# Patient Record
Sex: MALE | Race: WHITE | HISPANIC OR LATINO | Employment: FULL TIME | URBAN - METROPOLITAN AREA
[De-identification: names, ages, dates, MRNs, and addresses within clinical notes are randomized per-mention and may not be internally consistent; named-entity substitution may affect disease eponyms.]

---

## 2017-07-25 ENCOUNTER — HOSPITAL ENCOUNTER (OUTPATIENT)
Dept: RADIOLOGY | Facility: HOSPITAL | Age: 47
Discharge: HOME | End: 2017-07-25
Attending: INTERNAL MEDICINE
Payer: MEDICAID

## 2017-07-25 ENCOUNTER — OFFICE VISIT (OUTPATIENT)
Dept: INTERNAL MEDICINE | Facility: CLINIC | Age: 47
End: 2017-07-25
Payer: MEDICAID

## 2017-07-25 VITALS
HEART RATE: 96 BPM | DIASTOLIC BLOOD PRESSURE: 84 MMHG | WEIGHT: 215.38 LBS | SYSTOLIC BLOOD PRESSURE: 138 MMHG | TEMPERATURE: 98 F

## 2017-07-25 DIAGNOSIS — Z00.00 ROUTINE GENERAL MEDICAL EXAMINATION AT A HEALTH CARE FACILITY: ICD-10-CM

## 2017-07-25 DIAGNOSIS — K21.9 GASTROESOPHAGEAL REFLUX DISEASE, ESOPHAGITIS PRESENCE NOT SPECIFIED: ICD-10-CM

## 2017-07-25 DIAGNOSIS — R53.1 PARESTHESIAS WITH SUBJECTIVE WEAKNESS: ICD-10-CM

## 2017-07-25 DIAGNOSIS — R20.2 PARESTHESIAS WITH SUBJECTIVE WEAKNESS: ICD-10-CM

## 2017-07-25 DIAGNOSIS — Z00.00 ROUTINE GENERAL MEDICAL EXAMINATION AT A HEALTH CARE FACILITY: Primary | ICD-10-CM

## 2017-07-25 DIAGNOSIS — Z12.11 COLON CANCER SCREENING: ICD-10-CM

## 2017-07-25 DIAGNOSIS — Z12.5 PROSTATE CANCER SCREENING: ICD-10-CM

## 2017-07-25 PROCEDURE — 71020 XR CHEST PA AND LATERAL: CPT | Mod: TC

## 2017-07-25 PROCEDURE — 72050 X-RAY EXAM NECK SPINE 4/5VWS: CPT | Mod: TC

## 2017-07-25 PROCEDURE — 71020 XR CHEST PA AND LATERAL: CPT | Mod: 26,,, | Performed by: RADIOLOGY

## 2017-07-25 PROCEDURE — 72050 X-RAY EXAM NECK SPINE 4/5VWS: CPT | Mod: 26,,, | Performed by: RADIOLOGY

## 2017-07-25 PROCEDURE — 99999 PR PBB SHADOW E&M-NEW PATIENT-LVL III: CPT | Mod: PBBFAC,,, | Performed by: INTERNAL MEDICINE

## 2017-07-25 PROCEDURE — 99386 PREV VISIT NEW AGE 40-64: CPT | Mod: S$PBB,,, | Performed by: INTERNAL MEDICINE

## 2017-07-26 ENCOUNTER — LAB VISIT (OUTPATIENT)
Dept: LAB | Facility: HOSPITAL | Age: 47
End: 2017-07-26
Attending: INTERNAL MEDICINE
Payer: MEDICAID

## 2017-07-26 DIAGNOSIS — Z00.00 ROUTINE GENERAL MEDICAL EXAMINATION AT A HEALTH CARE FACILITY: ICD-10-CM

## 2017-07-26 DIAGNOSIS — Z12.5 PROSTATE CANCER SCREENING: ICD-10-CM

## 2017-07-26 LAB
ALBUMIN SERPL BCP-MCNC: 4.1 G/DL
ALP SERPL-CCNC: 55 U/L
ALT SERPL W/O P-5'-P-CCNC: 21 U/L
ANION GAP SERPL CALC-SCNC: 10 MMOL/L
AST SERPL-CCNC: 15 U/L
BASOPHILS # BLD AUTO: 0.03 K/UL
BASOPHILS NFR BLD: 0.4 %
BILIRUB SERPL-MCNC: 0.4 MG/DL
BUN SERPL-MCNC: 14 MG/DL
CALCIUM SERPL-MCNC: 9.1 MG/DL
CHLORIDE SERPL-SCNC: 103 MMOL/L
CHOLEST/HDLC SERPL: 7.1 {RATIO}
CO2 SERPL-SCNC: 25 MMOL/L
COMPLEXED PSA SERPL-MCNC: 0.57 NG/ML
CREAT SERPL-MCNC: 0.9 MG/DL
DIFFERENTIAL METHOD: NORMAL
EOSINOPHIL # BLD AUTO: 0.4 K/UL
EOSINOPHIL NFR BLD: 5.3 %
ERYTHROCYTE [DISTWIDTH] IN BLOOD BY AUTOMATED COUNT: 13.2 %
EST. GFR  (AFRICAN AMERICAN): >60 ML/MIN/1.73 M^2
EST. GFR  (NON AFRICAN AMERICAN): >60 ML/MIN/1.73 M^2
ESTIMATED AVG GLUCOSE: 120 MG/DL
GLUCOSE SERPL-MCNC: 94 MG/DL
HBA1C MFR BLD HPLC: 5.8 %
HCT VFR BLD AUTO: 44.4 %
HDL/CHOLESTEROL RATIO: 14 %
HDLC SERPL-MCNC: 200 MG/DL
HDLC SERPL-MCNC: 28 MG/DL
HGB BLD-MCNC: 14.9 G/DL
LDLC SERPL CALC-MCNC: 125.8 MG/DL
LYMPHOCYTES # BLD AUTO: 2.3 K/UL
LYMPHOCYTES NFR BLD: 28.1 %
MCH RBC QN AUTO: 28.7 PG
MCHC RBC AUTO-ENTMCNC: 33.6 G/DL
MCV RBC AUTO: 85 FL
MONOCYTES # BLD AUTO: 0.5 K/UL
MONOCYTES NFR BLD: 6.4 %
NEUTROPHILS # BLD AUTO: 4.9 K/UL
NEUTROPHILS NFR BLD: 59.6 %
NONHDLC SERPL-MCNC: 172 MG/DL
PLATELET # BLD AUTO: 182 K/UL
PMV BLD AUTO: 12.2 FL
POTASSIUM SERPL-SCNC: 4.6 MMOL/L
PROT SERPL-MCNC: 7.6 G/DL
RBC # BLD AUTO: 5.2 M/UL
SODIUM SERPL-SCNC: 138 MMOL/L
TRIGL SERPL-MCNC: 231 MG/DL
TSH SERPL DL<=0.005 MIU/L-ACNC: 0.84 UIU/ML
WBC # BLD AUTO: 8.23 K/UL

## 2017-07-26 PROCEDURE — 84443 ASSAY THYROID STIM HORMONE: CPT

## 2017-07-26 PROCEDURE — 85025 COMPLETE CBC W/AUTO DIFF WBC: CPT

## 2017-07-26 PROCEDURE — 36415 COLL VENOUS BLD VENIPUNCTURE: CPT

## 2017-07-26 PROCEDURE — 84153 ASSAY OF PSA TOTAL: CPT

## 2017-07-26 PROCEDURE — 80053 COMPREHEN METABOLIC PANEL: CPT

## 2017-07-26 PROCEDURE — 83036 HEMOGLOBIN GLYCOSYLATED A1C: CPT

## 2017-07-26 PROCEDURE — 80061 LIPID PANEL: CPT

## 2017-07-27 NOTE — PROGRESS NOTES
Subjective:       Patient ID: Zeeshan Patel is a 47 y.o. male.    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 Overall doing well. We discussed his previous medical history that includes hx of gastric ulcer as per EGD done in 2014. He was treated with PPI and sx's resolved He occasionally experiences reflux and was told by his local MD that he might need surgery to address his hiatal hernia. I discussed the issue of HH  Associated with reflux and the need to prevent problems in the future. He denies abdominal pain, dysphagia, weight loss. Will obtain stool for H pylori and occult blood and treat accordingly. He also relates hx of work-related accident in 2012 while working at the [employer]. He was struck by a heavy truck door on his head sustaining a large laceration, and C spine issues due to the spine hyperextension as a result of the blow. Brings previous records including MRI report that reported spinal chord changes, myelomalacia. He reports bilateral arm numbness, R flank numbness. Has some AM stiffness lasting 10-15 minutes and resolves. I will obtain C-spine XR initially and proceed from there. I will request copy of the MRI if available as well. If not, will obtain MRI if indicated.  Review of Systems   All other systems reviewed and are negative.      Objective:      Physical Exam   Constitutional: He is oriented to person, place, and time. He appears well-developed and well-nourished. No distress.   HENT:   Head: Normocephalic and atraumatic.   Right Ear: External ear normal.   Left Ear: External ear normal.   Nose: Nose normal.   Mouth/Throat: Oropharynx is clear and moist. No oropharyngeal exudate.   Eyes: Conjunctivae and EOM are normal. Pupils are equal, round, and reactive to light. Right eye exhibits no discharge. Left eye exhibits no discharge. No scleral icterus.   Neck: Neck supple.  No JVD present. No thyromegaly present.   Cardiovascular: Normal rate, regular rhythm, normal heart sounds and intact distal pulses.    No murmur heard.  Pulmonary/Chest: Effort normal and breath sounds normal. No respiratory distress. He has no wheezes. He exhibits no tenderness.   Abdominal: Soft. Bowel sounds are normal. He exhibits no distension and no mass. There is no tenderness.   Musculoskeletal: Normal range of motion. He exhibits no edema.   Lymphadenopathy:     He has no cervical adenopathy.   Neurological: He is alert and oriented to person, place, and time. No cranial nerve deficit.   Skin: Skin is warm and dry. No rash noted. He is not diaphoretic. No erythema.   Psychiatric: He has a normal mood and affect. His behavior is normal. Judgment and thought content normal.   Nursing note and vitals reviewed.      Assessment:       1. Routine general medical examination at a health care facility    2. Paresthesias with subjective weakness    3. Prostate cancer screening    4. Colon cancer screening    5. Gastroesophageal reflux disease, esophagitis presence not specified        Plan:    1. Obtain labs.         2. Stool for heme and H pylori.         3. C-spine XR with flexion/extension views.         4. CXR.         5. Consider repeat MRI C spine if indicated.         6. RTC for review.

## 2017-08-03 ENCOUNTER — OFFICE VISIT (OUTPATIENT)
Dept: INTERNAL MEDICINE | Facility: CLINIC | Age: 47
End: 2017-08-03
Payer: MEDICAID

## 2017-08-03 DIAGNOSIS — R20.2 PARESTHESIA OF RIGHT ARM: ICD-10-CM

## 2017-08-03 DIAGNOSIS — Z71.89 ENCOUNTER FOR MEDICATION REVIEW AND COUNSELING: Primary | ICD-10-CM

## 2017-08-03 DIAGNOSIS — M50.30 DDD (DEGENERATIVE DISC DISEASE), CERVICAL: ICD-10-CM

## 2017-08-03 PROCEDURE — 99211 OFF/OP EST MAY X REQ PHY/QHP: CPT | Mod: PBBFAC | Performed by: INTERNAL MEDICINE

## 2017-08-03 PROCEDURE — 99213 OFFICE O/P EST LOW 20 MIN: CPT | Mod: S$PBB,,, | Performed by: INTERNAL MEDICINE

## 2017-08-03 PROCEDURE — 99999 PR PBB SHADOW E&M-EST. PATIENT-LVL I: CPT | Mod: PBBFAC,,, | Performed by: INTERNAL MEDICINE

## 2017-08-18 PROBLEM — R20.2 PARESTHESIA OF RIGHT ARM: Status: ACTIVE | Noted: 2017-08-18

## 2017-08-18 NOTE — PROGRESS NOTES
Mr Griffin Patel here today for his scheduled review. I gave him copies of his studies and discussed them in detail including blood work that showed mild increase in cholesterol at 200 with low HDL fraction and increased TG's at 231. A1-C was at the upper limit of normal at 5.8 with normal FBS. I discussed the implications of these findings vis a vis future health issues and the need to address this vis proper nutrition, exercise and weight management. C spine XR showed degenerative changes at various levels. He has hx of C spine trauma as my initial note reflects, and will consider additional imaging studies -ie MRI, if indicated. He will let me know. All other parameters were within normal limits. I will see him back in 6 months or sooner if needed.

## 2018-01-24 ENCOUNTER — OFFICE VISIT (OUTPATIENT)
Dept: INTERNAL MEDICINE | Facility: CLINIC | Age: 48
End: 2018-01-24
Payer: MEDICAID

## 2018-01-24 DIAGNOSIS — M79.644 PAIN IN FINGER OF RIGHT HAND: Primary | ICD-10-CM

## 2018-01-24 PROCEDURE — 99999 PR PBB SHADOW E&M-EST. PATIENT-LVL II: CPT | Mod: PBBFAC,,, | Performed by: INTERNAL MEDICINE

## 2018-01-24 PROCEDURE — 99212 OFFICE O/P EST SF 10 MIN: CPT | Mod: PBBFAC | Performed by: INTERNAL MEDICINE

## 2018-01-24 PROCEDURE — 99213 OFFICE O/P EST LOW 20 MIN: CPT | Mod: S$PBB,,, | Performed by: INTERNAL MEDICINE

## 2018-01-25 ENCOUNTER — HOSPITAL ENCOUNTER (OUTPATIENT)
Dept: RADIOLOGY | Facility: HOSPITAL | Age: 48
Discharge: HOME | End: 2018-01-25
Attending: INTERNAL MEDICINE
Payer: MEDICAID

## 2018-01-25 DIAGNOSIS — M79.644 PAIN IN FINGER OF RIGHT HAND: ICD-10-CM

## 2018-01-25 PROCEDURE — 73140 X-RAY EXAM OF FINGER(S): CPT | Mod: 26,RT,, | Performed by: RADIOLOGY

## 2018-01-25 PROCEDURE — 73140 X-RAY EXAM OF FINGER(S): CPT | Mod: TC,FY,RT

## 2018-02-06 VITALS — DIASTOLIC BLOOD PRESSURE: 84 MMHG | SYSTOLIC BLOOD PRESSURE: 128 MMHG | HEART RATE: 68 BPM

## 2018-02-06 NOTE — PROGRESS NOTES
Subjective:       Patient ID: Zeeshan Patel is a 48 y.o. male.    Chief Complaint: Follow-up    HPIMr Zeeshan here today for evaluation of recent injury to his R 4th finger. He was drying himself at home with a towel after bathing and felt pain in his finger while grabbing the towel and drying his back. He later noted his finger to be flexed being unable to fully extend the distal portion of his finger. I obtained XR that did not show any significant abnormalities. I recommended some ROM exercises that he could do on his own and will monitor. He has no significant pain.  Review of Systems   All other systems reviewed and are negative.      Objective:      Physical Exam   Constitutional: He appears well-developed and well-nourished. No distress.   Cardiovascular: Normal rate, regular rhythm, normal heart sounds and intact distal pulses.    No murmur heard.  Pulmonary/Chest: Effort normal and breath sounds normal. No respiratory distress. He has no wheezes.   Musculoskeletal: He exhibits deformity.   R 4th finger with DIP in a flexed position as described in  HPI. Dislocated tendon?   Skin: He is not diaphoretic.   Nursing note and vitals reviewed.      Assessment:       1. Pain in finger of right hand        Plan:    1. Monitor.         2. Consider formal Ortho referral if needed.         3. RTC Prn.

## 2018-05-23 ENCOUNTER — HOSPITAL ENCOUNTER (EMERGENCY)
Facility: HOSPITAL | Age: 48
Discharge: HOME | End: 2018-05-23
Attending: EMERGENCY MEDICINE
Payer: MEDICAID

## 2018-05-23 VITALS
OXYGEN SATURATION: 97 % | DIASTOLIC BLOOD PRESSURE: 92 MMHG | BODY MASS INDEX: 37.56 KG/M2 | HEART RATE: 100 BPM | SYSTOLIC BLOOD PRESSURE: 123 MMHG | HEIGHT: 64 IN | WEIGHT: 220 LBS | RESPIRATION RATE: 18 BRPM | TEMPERATURE: 99 F

## 2018-05-23 DIAGNOSIS — J01.90 ACUTE NON-RECURRENT SINUSITIS, UNSPECIFIED LOCATION: Primary | ICD-10-CM

## 2018-05-23 PROCEDURE — 99283 EMERGENCY DEPT VISIT LOW MDM: CPT

## 2018-05-23 RX ORDER — FLUTICASONE PROPIONATE 50 MCG
1 SPRAY, SUSPENSION (ML) NASAL 2 TIMES DAILY PRN
Qty: 15 G | Refills: 0 | Status: SHIPPED | OUTPATIENT
Start: 2018-05-23

## 2018-05-23 NOTE — DISCHARGE INSTRUCTIONS
You can try Sudafed over the counter for relief    Please follow up with ENT    If fever, worsening pain, or symptoms that persist > 2 weeks, please see PCP

## 2018-05-23 NOTE — ED TRIAGE NOTES
Pt reports that his left ear is clogged up and his face is numb that started 0200 this morning. Pt reports not being able to hear well. Pt reports he hasn't had any meds. Pt rates pain 6 out 10. Hx of sinus infection. Pt denies any s/s other than h/a at this time. Pt has no slurred speech or facial droop.    Patient identifiers verified and correct   LOC: The patient is awake, alert and aware of environment with an appropriate affect, the patient is oriented x 3 and speaking appropriately.   APPEARANCE: Patient appears comfortable and in no acute distress, patient is clean and well groomed.  SKIN: The skin is warm and dry, color consistent with ethnicity, patient has normal skin turgor and moist mucus membranes, skin intact, no breakdown or bruising noted.   MUSCULOSKELETAL: Patient moving all extremities spontaneously, no swelling noted.  RESPIRATORY: Airway is open and patent, respirations are spontaneous, patient has a normal effort and rate, no accessory muscle use noted

## 2018-05-23 NOTE — ED PROVIDER NOTES
Encounter Date: 5/23/2018    SCRIBE #1 NOTE: I, Dr. Koroma, am scribing for, and in the presence of,  Juan David Webb. I have scribed the following portions of the note -       History     Chief Complaint   Patient presents with    Numbness     reports facial numbness alone. Denies any other numbness/tingling. States began this morning. Reports recent sinus congestion.      Time patient was seen by the provider: 12:29 PM      The patient is a 48 y.o. Male who presents to the ED with a facial numbness that began at 2 am this morning.  Pt states he has been having pain in left ear, associated with decreased hearing and sinus congestion ( thick green discharge when blowing nose).  Pt states numbness is located around the eyes and cheeks, bilaterally.  He also complains of sore throat and pain on swallowing.  Denies fever, headaches, blurred vision, facial droop, slurred speech, dizziness or unilateral weakness.  Pt did not take medication for relief.  No sick contacts.            Review of patient's allergies indicates:  No Known Allergies  Past Medical History:   Diagnosis Date    GERD (gastroesophageal reflux disease)     Ulcer      Past Surgical History:   Procedure Laterality Date    APPENDECTOMY      left shoulder      WRIST ARTHROPLASTY       Family History   Problem Relation Age of Onset    Cancer Mother     Kidney disease Mother     Hypertension Mother     Hypertension Father     Hypertension Maternal Aunt     Diabetes Maternal Grandfather      Social History   Substance Use Topics    Smoking status: Current Every Day Smoker    Smokeless tobacco: Never Used    Alcohol use No     Review of Systems   Constitutional: Negative for fever.   HENT: Positive for ear pain, sinus pain, sinus pressure (L side of face) and trouble swallowing. Negative for ear discharge and sore throat.    Eyes: Negative for visual disturbance.   Respiratory: Negative for cough.    Cardiovascular: Negative for chest pain.    Gastrointestinal: Negative for nausea and vomiting.   Genitourinary: Negative for dysuria.   Musculoskeletal: Negative for back pain.   Skin: Negative for rash.   Neurological: Positive for numbness (face). Negative for speech difficulty.       Physical Exam     Initial Vitals [05/23/18 1206]   BP Pulse Resp Temp SpO2   (!) 146/96 (!) 117 18 98.3 °F (36.8 °C) 96 %      MAP       112.67         Physical Exam    Nursing note and vitals reviewed.  Constitutional: He appears well-developed and well-nourished. He is not diaphoretic. No distress.   HENT:   Head: Normocephalic and atraumatic.   Mouth/Throat: Posterior oropharyngeal erythema present. No oropharyngeal exudate.   Bilateral cerumen impaction. Frontal and maxillary sinus tenderness.    Neck: Normal range of motion. Neck supple. No JVD present.   Cardiovascular: Normal rate, regular rhythm, normal heart sounds and intact distal pulses.   Pulmonary/Chest: Breath sounds normal. No respiratory distress. He has no wheezes. He has no rhonchi. He has no rales.   Abdominal: Soft. He exhibits no distension. There is no tenderness.   Musculoskeletal: Normal range of motion. He exhibits no edema.   Lymphadenopathy:     He has no cervical adenopathy.   Neurological: He is alert and oriented to person, place, and time. He has normal strength. No cranial nerve deficit or sensory deficit.   Normal sensation to sharp and dull stimuli of the face     Skin: Skin is warm and dry.         ED Course   Procedures  Labs Reviewed - No data to display          Medical Decision Making:   History:   Old Medical Records: I decided to obtain old medical records.  Initial Assessment:   Urgent evaluation of 49 yo male presenting w/ ear pain, decreased hearing and sinus congestion.  Differential Diagnosis:   My initial differential diagnoses include but are not limited to: acute sinusitis, eustachian tube dysfunction, cerumen impaction.   ED Management:  Pt is neurologically intact.  Sensation is symmetrical with no deficits. I doubt CVA/TIA. Sx are likely secondary to acute sinusitis. He has no red flags. Therefore, will treat symptomatically with Flonase. Discussed getting sudafed over the counter. I have irrigated bilateral ears with removal of cerumen of L ear and he reports improvement of hearing. Will refer to ENT. Pt prescribed Debrox.               Scribe Attestation:   Scribe #1: I performed the above scribed service and the documentation accurately describes the services I performed. I attest to the accuracy of the note.    Attending Attestation:           Physician Attestation for Scribe:      Comments: I, Dr. Katerin Koroma, personally performed the services described in this documentation. All medical record entries made by the scribe were at my direction and in my presence.  I have reviewed the chart and agree that the record reflects my personal performance and is accurate and complete. Katerin Koroma MD.                 Clinical Impression:   The encounter diagnosis was Acute non-recurrent sinusitis, unspecified location.    Disposition:   Disposition: Discharged  Condition: Stable                        Katerin Koroma MD  05/24/18 1462

## 2021-03-31 ENCOUNTER — IMMUNIZATION (OUTPATIENT)
Dept: PRIMARY CARE CLINIC | Facility: CLINIC | Age: 51
End: 2021-03-31
Payer: MEDICAID

## 2021-03-31 DIAGNOSIS — Z23 NEED FOR VACCINATION: Primary | ICD-10-CM

## 2021-03-31 PROCEDURE — 0001A COVID-19, MRNA, LNP-S, PF, 30 MCG/0.3 ML DOSE VACCINE: ICD-10-PCS | Mod: CV19,S$GLB,, | Performed by: INTERNAL MEDICINE

## 2021-03-31 PROCEDURE — 91300 COVID-19, MRNA, LNP-S, PF, 30 MCG/0.3 ML DOSE VACCINE: CPT | Mod: S$GLB,,, | Performed by: INTERNAL MEDICINE

## 2021-03-31 PROCEDURE — 91300 COVID-19, MRNA, LNP-S, PF, 30 MCG/0.3 ML DOSE VACCINE: ICD-10-PCS | Mod: S$GLB,,, | Performed by: INTERNAL MEDICINE

## 2021-03-31 PROCEDURE — 0001A COVID-19, MRNA, LNP-S, PF, 30 MCG/0.3 ML DOSE VACCINE: CPT | Mod: CV19,S$GLB,, | Performed by: INTERNAL MEDICINE

## 2021-04-21 ENCOUNTER — IMMUNIZATION (OUTPATIENT)
Dept: PRIMARY CARE CLINIC | Facility: CLINIC | Age: 51
End: 2021-04-21

## 2021-04-21 DIAGNOSIS — Z23 NEED FOR VACCINATION: Primary | ICD-10-CM

## 2021-04-21 PROCEDURE — 0002A COVID-19, MRNA, LNP-S, PF, 30 MCG/0.3 ML DOSE VACCINE: CPT | Mod: CV19,S$GLB,, | Performed by: INTERNAL MEDICINE

## 2021-04-21 PROCEDURE — 91300 COVID-19, MRNA, LNP-S, PF, 30 MCG/0.3 ML DOSE VACCINE: CPT | Mod: S$GLB,,, | Performed by: INTERNAL MEDICINE

## 2021-04-21 PROCEDURE — 0002A COVID-19, MRNA, LNP-S, PF, 30 MCG/0.3 ML DOSE VACCINE: ICD-10-PCS | Mod: CV19,S$GLB,, | Performed by: INTERNAL MEDICINE

## 2021-04-21 PROCEDURE — 91300 COVID-19, MRNA, LNP-S, PF, 30 MCG/0.3 ML DOSE VACCINE: ICD-10-PCS | Mod: S$GLB,,, | Performed by: INTERNAL MEDICINE

## 2022-08-24 ENCOUNTER — OCCUPATIONAL HEALTH (OUTPATIENT)
Dept: URGENT CARE | Facility: CLINIC | Age: 52
End: 2022-08-24

## 2022-08-24 DIAGNOSIS — Z02.83 ENCOUNTER FOR DRUG SCREENING: Primary | ICD-10-CM

## 2022-08-24 LAB
CTP QC/QA: YES
POC 5 PANEL DRUG SCREEN: NEGATIVE

## 2022-08-24 PROCEDURE — 80305 DRUG TEST PRSMV DIR OPT OBS: CPT | Mod: S$GLB,,, | Performed by: EMERGENCY MEDICINE

## 2022-08-24 PROCEDURE — 80305 POCT RAPID DRUG SCREEN 5 PANEL: ICD-10-PCS | Mod: S$GLB,,, | Performed by: EMERGENCY MEDICINE

## 2022-10-06 ENCOUNTER — PATIENT MESSAGE (OUTPATIENT)
Dept: INTERNAL MEDICINE | Facility: CLINIC | Age: 52
End: 2022-10-06
Payer: MEDICAID

## 2022-10-24 ENCOUNTER — PATIENT MESSAGE (OUTPATIENT)
Dept: INTERNAL MEDICINE | Facility: CLINIC | Age: 52
End: 2022-10-24
Payer: MEDICAID

## 2024-08-05 ENCOUNTER — OFFICE VISIT (OUTPATIENT)
Dept: URGENT CARE | Facility: CLINIC | Age: 54
End: 2024-08-05
Payer: COMMERCIAL

## 2024-08-05 VITALS
HEIGHT: 64 IN | RESPIRATION RATE: 19 BRPM | BODY MASS INDEX: 40.12 KG/M2 | HEART RATE: 85 BPM | WEIGHT: 235 LBS | TEMPERATURE: 98 F | SYSTOLIC BLOOD PRESSURE: 171 MMHG | OXYGEN SATURATION: 97 % | DIASTOLIC BLOOD PRESSURE: 104 MMHG

## 2024-08-05 DIAGNOSIS — S30.0XXA LUMBAR CONTUSION, INITIAL ENCOUNTER: Primary | ICD-10-CM

## 2024-08-05 DIAGNOSIS — Z02.6 ENCOUNTER RELATED TO WORKER'S COMPENSATION CLAIM: ICD-10-CM

## 2024-08-05 DIAGNOSIS — S39.012A ACUTE MYOFASCIAL STRAIN OF LUMBAR REGION, INITIAL ENCOUNTER: ICD-10-CM

## 2024-08-05 LAB
CTP QC/QA: YES
POC 5 PANEL DRUG SCREEN: NEGATIVE

## 2024-08-05 RX ORDER — LIDOCAINE 50 MG/G
1 PATCH TOPICAL
COMMUNITY
Start: 2024-07-31 | End: 2024-08-07

## 2024-08-05 RX ORDER — IBUPROFEN 800 MG/1
800 TABLET ORAL 3 TIMES DAILY PRN
Qty: 30 TABLET | Refills: 0 | Status: SHIPPED | OUTPATIENT
Start: 2024-08-05 | End: 2024-08-15

## 2024-08-05 RX ORDER — IBUPROFEN 800 MG/1
800 TABLET ORAL
COMMUNITY
Start: 2024-07-31 | End: 2024-08-05 | Stop reason: SDUPTHER

## 2024-08-05 RX ORDER — METHOCARBAMOL 500 MG/1
500 TABLET, FILM COATED ORAL 2 TIMES DAILY PRN
Qty: 14 TABLET | Refills: 0 | Status: SHIPPED | OUTPATIENT
Start: 2024-08-05 | End: 2024-08-12

## 2024-08-05 RX ORDER — METHOCARBAMOL 500 MG/1
1 TABLET, FILM COATED ORAL 2 TIMES DAILY
COMMUNITY
Start: 2024-07-31 | End: 2024-08-05 | Stop reason: SDUPTHER

## 2024-08-12 ENCOUNTER — OFFICE VISIT (OUTPATIENT)
Dept: URGENT CARE | Facility: CLINIC | Age: 54
End: 2024-08-12
Payer: COMMERCIAL

## 2024-08-12 VITALS
BODY MASS INDEX: 40.12 KG/M2 | WEIGHT: 235 LBS | DIASTOLIC BLOOD PRESSURE: 108 MMHG | HEART RATE: 83 BPM | OXYGEN SATURATION: 98 % | HEIGHT: 64 IN | RESPIRATION RATE: 18 BRPM | SYSTOLIC BLOOD PRESSURE: 188 MMHG

## 2024-08-12 DIAGNOSIS — S39.012D ACUTE MYOFASCIAL STRAIN OF LUMBAR REGION, SUBSEQUENT ENCOUNTER: ICD-10-CM

## 2024-08-12 DIAGNOSIS — S30.0XXD LUMBAR CONTUSION, SUBSEQUENT ENCOUNTER: Primary | ICD-10-CM

## 2024-08-12 DIAGNOSIS — Z02.6 ENCOUNTER RELATED TO WORKER'S COMPENSATION CLAIM: ICD-10-CM

## 2024-08-12 PROCEDURE — 99213 OFFICE O/P EST LOW 20 MIN: CPT | Mod: S$GLB,,, | Performed by: PHYSICIAN ASSISTANT

## 2024-08-12 RX ORDER — LOSARTAN POTASSIUM AND HYDROCHLOROTHIAZIDE 25; 100 MG/1; MG/1
TABLET ORAL
COMMUNITY
Start: 2024-03-07

## 2024-08-12 RX ORDER — DICLOFENAC SODIUM 10 MG/G
2 GEL TOPICAL 4 TIMES DAILY PRN
Qty: 200 G | Refills: 0 | Status: SHIPPED | OUTPATIENT
Start: 2024-08-12 | End: 2024-08-26

## 2024-08-12 RX ORDER — CYCLOBENZAPRINE HCL 10 MG
10 TABLET ORAL 3 TIMES DAILY PRN
Qty: 30 TABLET | Refills: 0 | Status: SHIPPED | OUTPATIENT
Start: 2024-08-12 | End: 2024-08-22

## 2024-08-12 RX ORDER — METFORMIN HYDROCHLORIDE 500 MG/1
TABLET, EXTENDED RELEASE ORAL
COMMUNITY

## 2024-08-12 RX ORDER — ASPIRIN 81 MG/1
1 TABLET ORAL DAILY
COMMUNITY

## 2024-08-12 NOTE — PROGRESS NOTES
Subjective:      Patient ID: Zeeshan Patel is a 54 y.o. male.    Chief Complaint: Back Pain    Patient's place of employment - [address] [place] [address]  Patient's job title - Maintenance  Date of injury - 07-31-24  Body part injured including left or right - Back  Injury Mechanism - Fall  What they were doing when they got hurt - climbing  What they did immediately after -   Pain scale right now -     Back Pain      Musculoskeletal:  Positive for back pain.     Objective:     Physical Exam   Assessment:      1. Encounter related to worker's compensation claim      Plan:                 No follow-ups on file.

## 2024-08-12 NOTE — LETTER
[facility] - [facility]  [address] [address]  Phone: [phone]  Fax: [phone]  [facility]: [email or web address]    Pt Name: Zeeshan Patel  Injury Date: 07/31/2024   Employee ID: [id number] Date of  Treatment: 08/12/2024   Company: [employer]      Appointment Time: 08:30 AM Arrived: 8:20 AM    Provider: Rajwinder Higginbotham PA-C Time Out:9:26 AM      Office Treatment:   1. Lumbar contusion, subsequent encounter    2. Acute myofascial strain of lumbar region, subsequent encounter    3. Encounter related to worker's compensation claim      Medications Ordered This Encounter   Medications    cyclobenzaprine (FLEXERIL) 10 MG tablet    diclofenac sodium (VOLTAREN) 1 % Gel        Patient Instructions:  (Please take extra strength tylenol twice daily.  You may take 1/2 of the muscle relaxer up to twice during the day (every 8 hours).  You may take a whole muscle relaxer at night.  Do not drive/operate machinery while taking the muscle relaxer.)      Restrictions: Sit down work only, Sit or stand as needed     Return Appointment: 8/20/2024 at 9:00 AM [facility]

## 2024-08-12 NOTE — PROGRESS NOTES
Subjective:      Patient ID: Zeeshan Patel is a 54 y.o. male.    Chief Complaint: Back Pain    Mr. Griffin Patel presents for follow up of low back injury, DOI 7/31/24.  He works in maintenance at [employer].  He reports his pain is about the same.  He has R sided LBP that sometimes radiates into the right hip area.  He initially had BLE paresthesias, worse on the right, but he reports these are now resolved.  He denies any weakness, saddle anesthesia or B/B dysfunction.  The pain is worse with sit to stand, bending, twisting.  He reports the robaxin does help him sleep, but he wakes up dizzy in the mornings.  He does not feel the ibuprofen is giving him much relief.  His work has not accommodated his restrictions.  His BP is elevated again today and he has a follow up this week to address with primary care.  Review of his chart shows significantly elevated BP several times in the past.  He is asymptomatic.        See MA note below.  Patient's place of employment - [address] [place] [address] [employer]  Patient's job title - Maintenance  Date of Injury - 07-31-24  Body part injured - Back  Current work status per last visit -  Sit down work only, Sit or stand as needed  Improved, same, or worse - Same  Pain Scale right now (1-10) -  8/10    Back Pain  Pertinent negatives include no numbness.     Constitution: Negative.   HENT: Negative.     Neck: neck negative.   Cardiovascular: Negative.    Respiratory: Negative.     Musculoskeletal:  Positive for pain, joint pain, abnormal ROM of joint, back pain, pain with walking, muscle cramps and muscle ache. Negative for trauma and joint swelling.   Skin:  Negative for erythema and bruising.   Neurological:  Negative for numbness and tingling.     Objective:     Physical Exam  Constitutional:       Appearance: Normal appearance.   HENT:      Head: Normocephalic and atraumatic.      Right Ear: External ear normal.      Left Ear: External ear normal.      Nose: Nose normal. No  congestion or rhinorrhea.   Eyes:      Extraocular Movements: Extraocular movements intact.      Conjunctiva/sclera: Conjunctivae normal.   Pulmonary:      Effort: Pulmonary effort is normal. No respiratory distress.   Musculoskeletal:      Cervical back: Normal.      Thoracic back: Normal.      Lumbar back: Spasms and tenderness present. Decreased range of motion. Negative right straight leg raise test and negative left straight leg raise test.        Back:       Comments: No step-offs appreciated.  TTP R lower lumbar spine & paraspinal musculature.  BLE 5/5 HF, KF, KE, DF, PF, EHL.    BLE DTR 2+ & symmetric.  Dec ROM with flexion, extension and side bending bilaterally.   All ROM movements elicit pain.  SLR neg bilaterally.  Difficulty getting on/off exam table.     Skin:     General: Skin is warm.      Findings: No erythema.   Neurological:      General: No focal deficit present.      Mental Status: He is alert and oriented to person, place, and time. Mental status is at baseline.   Psychiatric:         Mood and Affect: Mood normal.         Behavior: Behavior normal.         Thought Content: Thought content normal.         Judgment: Judgment normal.        Assessment:      1. Lumbar contusion, subsequent encounter    2. Acute myofascial strain of lumbar region, subsequent encounter    3. Encounter related to worker's compensation claim      Plan:     D/C ibuprofen & robaxin.  I advised no NSAIDs, as this could be contributing to his elevated BP.  He will take tylenol 2-3 times per day.  Rx for voltaren gel PRN.  He may try 1/2 a flexeril during the day 1-2 times and then a whole flexeril at night.  He may only take the flexeril during the day if he is not working.  I discussed precautions of no driving/operating machinery while taking this medication.  He will follow up in 1 week.  Consider PT if symptoms have not significantly improved.    I note the patient has elevated blood pressures during this encounter.  Patient does not have signs or symptoms suggestive of hypertensive emergency (denies chest pain, shortness breath, vision change, or urinary changes consistent with acute hypertensive kidney disease). Risk of acutely lowering blood pressure exceeds benefit. I have asked the patient follow up with PCP for continued hypertension management.    Medications Ordered This Encounter   Medications    cyclobenzaprine (FLEXERIL) 10 MG tablet     Sig: Take 1 tablet (10 mg total) by mouth 3 (three) times daily as needed for Muscle spasms.     Dispense:  30 tablet     Refill:  0    diclofenac sodium (VOLTAREN) 1 % Gel     Sig: Apply 2 g topically 4 (four) times daily as needed (pain).     Dispense:  200 g     Refill:  0     Patient Instructions:  (Please take extra strength tylenol twice daily.  You may take 1/2 of the muscle relaxer up to twice during the day (every 8 hours).  You may take a whole muscle relaxer at night.  Do not drive/operate machinery while taking the muscle relaxer.)   Restrictions: Sit down work only, Sit or stand as needed  Follow up in about 8 days (around 8/20/2024).

## 2024-08-20 ENCOUNTER — OFFICE VISIT (OUTPATIENT)
Dept: URGENT CARE | Facility: CLINIC | Age: 54
End: 2024-08-20
Payer: COMMERCIAL

## 2024-08-20 VITALS
WEIGHT: 235 LBS | OXYGEN SATURATION: 98 % | HEART RATE: 93 BPM | SYSTOLIC BLOOD PRESSURE: 176 MMHG | DIASTOLIC BLOOD PRESSURE: 105 MMHG | RESPIRATION RATE: 18 BRPM | BODY MASS INDEX: 40.12 KG/M2 | HEIGHT: 64 IN

## 2024-08-20 DIAGNOSIS — M25.551 RIGHT HIP PAIN: ICD-10-CM

## 2024-08-20 DIAGNOSIS — S30.0XXD LUMBAR CONTUSION, SUBSEQUENT ENCOUNTER: Primary | ICD-10-CM

## 2024-08-20 DIAGNOSIS — Z02.6 ENCOUNTER RELATED TO WORKER'S COMPENSATION CLAIM: ICD-10-CM

## 2024-08-20 DIAGNOSIS — S39.012D LUMBAR STRAIN, SUBSEQUENT ENCOUNTER: ICD-10-CM

## 2024-08-20 PROCEDURE — 99214 OFFICE O/P EST MOD 30 MIN: CPT | Mod: S$GLB,,, | Performed by: PHYSICIAN ASSISTANT

## 2024-08-20 RX ORDER — CYCLOBENZAPRINE HCL 10 MG
10 TABLET ORAL 3 TIMES DAILY PRN
Qty: 30 TABLET | Refills: 0 | Status: SHIPPED | OUTPATIENT
Start: 2024-08-20 | End: 2024-08-30

## 2024-08-20 NOTE — PROGRESS NOTES
Subjective:      Patient ID: Zeeshan Patel is a 54 y.o. male.    Chief Complaint: Back Pain    Mr. Griffin Patel presents for follow up of low back injury, DOI 7/31/24.  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  He reports continue right sided LBP.  He denies any radiating leg pain, paresthesias, weakness, saddle anesthesia or B/B dysfunction.  The paresthesias he was experiencing in the legs has resolved.  He also complains that the hip is sore and hurts with sit to stand and walking.  It is also sore to touch.  He has been taking the muscle relaxer twice a day as well as tylenol, which give him relief.  His job is not accommodating light duty.    See MA note below.  Patient's place of employment - [address] [place] [address] [employer]  Patient's job title - Maintenance  Date of Injury - 07-31-24  Body part injured - Back  Current work status per last visit - Sit down work only, Sit or stand as needed  Improved, same, or worse - No Improvement/Same  Pain Scale right now (1-10) -  6/10    Back Pain  Pertinent negatives include no numbness.       Constitution: Negative.   HENT: Negative.     Neck: neck negative.   Cardiovascular: Negative.    Respiratory: Negative.     Musculoskeletal:  Positive for pain, joint pain, abnormal ROM of joint, back pain, pain with walking, muscle cramps and muscle ache. Negative for trauma and joint swelling.   Skin:  Negative for erythema and bruising.   Neurological:  Negative for numbness and tingling.     Objective:     Physical Exam  Constitutional:       Appearance: Normal appearance.   HENT:      Head: Normocephalic and atraumatic.      Right Ear: External ear normal.      Left Ear: External ear normal.      Nose: Nose normal. No congestion or rhinorrhea.   Eyes:      Extraocular Movements: Extraocular movements intact.      Conjunctiva/sclera: Conjunctivae normal.   Pulmonary:      Effort: Pulmonary effort is normal. No respiratory distress.    Musculoskeletal:      Cervical back: Normal.      Thoracic back: Normal.      Lumbar back: Spasms and tenderness present. Decreased range of motion. Negative right straight leg raise test and negative left straight leg raise test.        Back:       Right hip: Tenderness and bony tenderness present. No deformity, lacerations or crepitus. Normal range of motion. Normal strength.        Legs:       Comments: No step-offs appreciated.  TTP R lower lumbar spine & paraspinal musculature.  BLE 5/5 HF, KF, KE, DF, PF, EHL.    BLE DTR 2+ & symmetric.  Dec ROM with flexion, extension and side bending bilaterally.   All ROM movements elicit pain.  SLR neg bilaterally.  Difficulty getting on/off exam table.    R hip with TTP to greater trochanter.  There is hip pain with IR & ER.     Skin:     General: Skin is warm.      Findings: No erythema.   Neurological:      General: No focal deficit present.      Mental Status: He is alert and oriented to person, place, and time. Mental status is at baseline.   Psychiatric:         Mood and Affect: Mood normal.         Behavior: Behavior normal.         Thought Content: Thought content normal.         Judgment: Judgment normal.          EXAMINATION:  XR HIP WITH PELVIS WHEN PERFORMED 2 OR 3 VIEWS RIGHT     CLINICAL HISTORY:  R hip pain s/p fall; Pain in right hip     TECHNIQUE:  AP view of the pelvis and frog leg lateral view of the right hip were performed.     COMPARISON:  None     FINDINGS:  No fracture or dislocation.  No regional soft tissue abnormalities.     Incidentally noted is lower lumbar degenerative change.     Impression:     Unremarkable appearance of the right hip.     Assessment:      1. Lumbar contusion, subsequent encounter    2. Right hip pain    3. Lumbar strain, subsequent encounter    4. Encounter related to worker's compensation claim      Plan:     I personally reviewed R hip XR - negative for fracture.  He continues to have significant back pain & limited  lumbar ROM.  I have placed an order for PT to help improve his pain and ROM.  He will remain on light duty.  Continue flexeril, refill given.  Follow up in 2 weeks to allow for PT scheduling.    I note the patient has elevated blood pressures during this encounter. Patient does not have signs or symptoms suggestive of hypertensive emergency (denies chest pain, shortness breath, vision change, or urinary changes consistent with acute hypertensive kidney disease). Risk of acutely lowering blood pressure exceeds benefit. Mr. Patel did follow up with his PCP last week and his BP meds were adjusted.      Medications Ordered This Encounter   Medications    cyclobenzaprine (FLEXERIL) 10 MG tablet     Sig: Take 1 tablet (10 mg total) by mouth 3 (three) times daily as needed for Muscle spasms.     Dispense:  30 tablet     Refill:  0     Patient Instructions: PT to be scheduled once authorized (Please take extra strength tylenol twice daily.  You may take 1/2 of the muscle relaxer up to twice during the day (every 8 hours).  You may take a whole muscle relaxer at night.  Do not drive/operate machinery while taking the muscle relaxer.)   Restrictions: Sit or stand as needed, Sit down work only  Follow up in about 2 weeks (around 9/3/2024).

## 2024-08-20 NOTE — LETTER
[facility]  [address] [address]  Phone: [phone]  Fax: [phone]  [facility]: [id number]    Pt Name: Zeeshan Patel  Injury Date: 07/31/2024   Employee ID: [id number] Date of Treatment: 08/20/2024   Company: [employer]      Appointment Time: 09:00 AM Arrived: 8:50 AM    Provider: Rajwinder Higginbotham PA-C Time Out:10:23 AM      Office Treatment:   1. Lumbar contusion, subsequent encounter    2. Right hip pain    3. Lumbar strain, subsequent encounter    4. Encounter related to worker's compensation claim      Medications Ordered This Encounter   Medications    cyclobenzaprine (FLEXERIL) 10 MG tablet        Patient Instructions: PT to be scheduled once authorized (Please take extra strength tylenol twice daily.  You may take 1/2 of the muscle relaxer up to twice during the day (every 8 hours).  You may take a whole muscle relaxer at night.  Do not drive/operate machinery while taking the muscle relaxer.)      Restrictions: Sit or stand as needed, Sit down work only     Return Appointment: 9/3/2024 at 8:30 AM [facility]

## 2024-09-03 ENCOUNTER — OFFICE VISIT (OUTPATIENT)
Dept: URGENT CARE | Facility: CLINIC | Age: 54
End: 2024-09-03
Payer: COMMERCIAL

## 2024-09-03 VITALS
HEIGHT: 64 IN | OXYGEN SATURATION: 98 % | DIASTOLIC BLOOD PRESSURE: 97 MMHG | TEMPERATURE: 98 F | BODY MASS INDEX: 40.12 KG/M2 | WEIGHT: 235 LBS | SYSTOLIC BLOOD PRESSURE: 160 MMHG | HEART RATE: 83 BPM | RESPIRATION RATE: 18 BRPM

## 2024-09-03 DIAGNOSIS — S39.012D ACUTE MYOFASCIAL STRAIN OF LUMBAR REGION, SUBSEQUENT ENCOUNTER: ICD-10-CM

## 2024-09-03 DIAGNOSIS — S30.0XXD LUMBAR CONTUSION, SUBSEQUENT ENCOUNTER: Primary | ICD-10-CM

## 2024-09-03 DIAGNOSIS — M25.551 RIGHT HIP PAIN: ICD-10-CM

## 2024-09-03 DIAGNOSIS — Z02.6 ENCOUNTER RELATED TO WORKER'S COMPENSATION CLAIM: ICD-10-CM

## 2024-09-03 PROCEDURE — 99213 OFFICE O/P EST LOW 20 MIN: CPT | Mod: S$GLB,,, | Performed by: PHYSICIAN ASSISTANT

## 2024-09-03 RX ORDER — PRAVASTATIN SODIUM 40 MG/1
40 TABLET ORAL
COMMUNITY
Start: 2024-08-22

## 2024-09-03 RX ORDER — DICLOFENAC SODIUM 10 MG/G
GEL TOPICAL
COMMUNITY

## 2024-09-03 RX ORDER — CYCLOBENZAPRINE HCL 10 MG
TABLET ORAL
COMMUNITY
End: 2024-09-03

## 2024-09-03 RX ORDER — TRIAMCINOLONE ACETONIDE 1 MG/G
CREAM TOPICAL 2 TIMES DAILY
COMMUNITY
Start: 2024-08-22

## 2024-09-03 RX ORDER — METHOCARBAMOL 500 MG/1
TABLET, FILM COATED ORAL
COMMUNITY
End: 2024-09-03

## 2024-09-03 RX ORDER — PANTOPRAZOLE SODIUM 40 MG/1
40 TABLET, DELAYED RELEASE ORAL EVERY MORNING
COMMUNITY
Start: 2024-03-29

## 2024-09-03 RX ORDER — LOSARTAN POTASSIUM AND HYDROCHLOROTHIAZIDE 12.5; 5 MG/1; MG/1
1 TABLET ORAL
COMMUNITY
Start: 2024-08-22 | End: 2024-09-03

## 2024-09-03 RX ORDER — CYCLOBENZAPRINE HCL 10 MG
10 TABLET ORAL 2 TIMES DAILY PRN
Qty: 30 TABLET | Refills: 0 | Status: SHIPPED | OUTPATIENT
Start: 2024-09-03 | End: 2024-09-18

## 2024-09-03 RX ORDER — LOSARTAN POTASSIUM AND HYDROCHLOROTHIAZIDE 12.5; 5 MG/1; MG/1
TABLET ORAL
COMMUNITY
Start: 2024-03-07

## 2024-09-03 RX ORDER — FAMOTIDINE 40 MG/1
40 TABLET, FILM COATED ORAL 2 TIMES DAILY
COMMUNITY
Start: 2024-03-26

## 2024-09-03 NOTE — PROGRESS NOTES
Subjective:      Patient ID: Zeeshan Patel is a 54 y.o. male.    Chief Complaint: Back Pain (low)    Mr. Griffin Patel presents for follow up of low back injury, DOI 7/31/24.  He works in maintenance at [employer].  He reports the pain is better.  He is still having right sided low back pain & some hip pain.  He denies any radiating leg pain, paresthesias, weakness, saddle anesthesia or B/B dysfunction.  PT has still not been approved.  He is taking flexeril twice a day with relief.      See MA note below.  Patient's place of employment - [employer]  Patient's job title - [employer]  Date of Injury - 07/31/2024  Body part injured - low back   Current work status per last visit - No activity  Improved, same, or worse - slightly improved  Pain Scale right now (1-10) -  5/10    Back Pain  Pertinent negatives include no numbness.       Constitution: Negative.   HENT: Negative.     Neck: neck negative.   Cardiovascular: Negative.    Respiratory: Negative.     Musculoskeletal:  Positive for pain, joint pain, abnormal ROM of joint, back pain, pain with walking, muscle cramps and muscle ache. Negative for trauma and joint swelling.   Skin:  Negative for erythema and bruising.   Neurological:  Negative for numbness and tingling.     Objective:     Physical Exam  Constitutional:       Appearance: Normal appearance.   HENT:      Head: Normocephalic and atraumatic.      Right Ear: External ear normal.      Left Ear: External ear normal.      Nose: Nose normal. No congestion or rhinorrhea.   Eyes:      Extraocular Movements: Extraocular movements intact.      Conjunctiva/sclera: Conjunctivae normal.   Pulmonary:      Effort: Pulmonary effort is normal. No respiratory distress.   Musculoskeletal:      Cervical back: Normal.      Thoracic back: Normal.      Lumbar back: Spasms and tenderness present. Decreased range of motion. Negative right straight leg raise test and negative left straight leg raise test.         Back:       Right hip: Tenderness and bony tenderness present. No deformity, lacerations or crepitus. Normal range of motion. Normal strength.      Comments: No step-offs appreciated.  TTP R lower lumbar spine & paraspinal musculature.  BLE 5/5 HF, KF, KE, DF, PF, EHL.    BLE DTR 2+ & symmetric.  Near normal ROM with flexion, side bending.  Still dec with extension.  All ROM movements elicit pain.  SLR neg bilaterally.  Much less difficulty getting on/off exam table today.  R hip with no TTP.       Skin:     General: Skin is warm.      Findings: No erythema.   Neurological:      General: No focal deficit present.      Mental Status: He is alert and oriented to person, place, and time. Mental status is at baseline.   Psychiatric:         Mood and Affect: Mood normal.         Behavior: Behavior normal.         Thought Content: Thought content normal.         Judgment: Judgment normal.        Assessment:      1. Lumbar contusion, subsequent encounter    2. Acute myofascial strain of lumbar region, subsequent encounter    3. Right hip pain    4. Encounter related to worker's compensation claim      Plan:     Mr. Patel is doing much better on exam today & reports pain has improved.  I have reached out to the [facility] regarding PT approval, as it is still pending.  Continue flexeril PRN, refill given.  I have lifted some of his work restrictions.  Follow up in 2 weeks, or sooner, if needed.    Medications Ordered This Encounter   Medications    cyclobenzaprine (FLEXERIL) 10 MG tablet     Sig: Take 1 tablet (10 mg total) by mouth 2 (two) times daily as needed for Muscle spasms (spasm).     Dispense:  30 tablet     Refill:  0     Patient Instructions: Attention not to aggravate affected area, PT to be scheduled once authorized   Restrictions: No lifting/pushing/pulling more than 10 lbs, Avoid climbing/kneeling/squatting, Avoid frequent bending/lifting/twisting  Follow up in about 2 weeks (around 9/17/2024).

## 2024-09-03 NOTE — LETTER
[facility]  [address] [address]  Phone: [phone]  Fax: [phone]  [facility]: [id number]    Pt Name: Zeeshan Patel  Injury Date: 07/31/2024   Employee ID: [id number] Date of Treatment: 09/03/2024   Company: [employer]      Appointment Time: 08:30 AM Arrived: 8:15 AM    Provider: Rajwinder Higginbotham PA-C Time Out:9:21 AM      Office Treatment:   1. Lumbar contusion, subsequent encounter    2. Acute myofascial strain of lumbar region, subsequent encounter    3. Right hip pain    4. Encounter related to worker's compensation claim      Medications Ordered This Encounter   Medications    cyclobenzaprine (FLEXERIL) 10 MG tablet        Patient Instructions: Attention not to aggravate affected area, PT to be scheduled once authorized      Restrictions: No lifting/pushing/pulling more than 10 lbs, Avoid climbing/kneeling/squatting, Avoid frequent bending/lifting/twisting     Return Appointment: 9/17/2024 at 8:30 AM [facility]

## 2024-09-17 ENCOUNTER — OFFICE VISIT (OUTPATIENT)
Dept: URGENT CARE | Facility: CLINIC | Age: 54
End: 2024-09-17
Payer: COMMERCIAL

## 2024-09-17 VITALS
RESPIRATION RATE: 18 BRPM | DIASTOLIC BLOOD PRESSURE: 96 MMHG | HEIGHT: 64 IN | SYSTOLIC BLOOD PRESSURE: 153 MMHG | OXYGEN SATURATION: 98 % | WEIGHT: 235 LBS | TEMPERATURE: 98 F | BODY MASS INDEX: 40.12 KG/M2 | HEART RATE: 62 BPM

## 2024-09-17 DIAGNOSIS — S30.0XXD LUMBAR CONTUSION, SUBSEQUENT ENCOUNTER: ICD-10-CM

## 2024-09-17 DIAGNOSIS — Z02.6 ENCOUNTER RELATED TO WORKER'S COMPENSATION CLAIM: ICD-10-CM

## 2024-09-17 DIAGNOSIS — S39.012D ACUTE MYOFASCIAL STRAIN OF LUMBAR REGION, SUBSEQUENT ENCOUNTER: Primary | ICD-10-CM

## 2024-09-17 PROCEDURE — 99213 OFFICE O/P EST LOW 20 MIN: CPT | Mod: S$GLB,,, | Performed by: PHYSICIAN ASSISTANT

## 2024-09-17 NOTE — LETTER
[facility]  [address] [address]  Phone: [phone]  Fax: [phone]  [facility]: [id number]    Pt Name: Zeeshan Patel  Injury Date: 07/31/2024   Employee ID: [id number] Date of Treatment: 09/17/2024   Company: [employer]      Appointment Time: 08:30 AM Arrived: 8:30 AM    Provider: Rajwinder Higginbotham PA-C Time Out:9:13 AM      Office Treatment:   1. Acute myofascial strain of lumbar region, subsequent encounter    2. Lumbar contusion, subsequent encounter    3. Encounter related to worker's compensation claim          Patient Instructions: Continue Physical Therapy, Daily home exercises/warm soaks      Restrictions: No lifting/pushing/pulling more than 10 lbs, Avoid climbing/kneeling/squatting, Avoid frequent bending/lifting/twisting     Return Appointment: 10/8/2024 at 8:30 AM [facility]

## 2024-09-17 NOTE — PROGRESS NOTES
Subjective:      Patient ID: Zeeshan Patel is a 54 y.o. male.    Chief Complaint: Back Pain    Mr. Griffin Patel presents for follow up of low back injury, DOI 7/31/24.  He works in maintenance at [employer].  He reports his pain is the same.  He has right sided LBP that radiates into the hip intermittently.  He started PT yesterday, but they were unable to proceed with the session due to elevated BP.  He has contacted his PCP to address his BP.  He is still taking flexeril 1-2 times a day.  He denies any radiating leg pain, paresthesias, weakness, saddle anesthesia or B/B dysfunction.  Work is not accommodating light duty.    See MA note below.  Patient's place of employment - [employer]  Patient's job title - [employer]  Date of Injury - 07/31/24  Body part injured - Back   Current work status per last visit - No activity  Improved, same, or worse - Same  Pain Scale right now (1-10) -  7/10    Back Pain  Pertinent negatives include no abdominal pain, chest pain, dysuria, fever, headaches or numbness.       Constitution: Negative. Negative for appetite change, chills, sweating, fatigue and fever.   HENT: Negative.  Negative for ear pain, ear discharge, postnasal drip, sinus pain, sinus pressure and sore throat.    Neck: neck negative. Negative for neck pain and neck stiffness.   Cardiovascular: Negative.  Negative for chest trauma, chest pain, leg swelling, palpitations, sob on exertion and passing out.   Eyes:  Negative for blurred vision.   Respiratory: Negative.  Negative for cough and shortness of breath.    Gastrointestinal:  Negative for abdominal pain, nausea, vomiting and diarrhea.   Genitourinary:  Negative for dysuria, frequency and urgency.   Musculoskeletal:  Positive for pain, joint pain, abnormal ROM of joint, back pain, pain with walking and muscle ache. Negative for trauma, joint swelling and muscle cramps.   Skin:  Negative for rash, erythema and bruising.   Neurological:  Negative  for dizziness, history of vertigo, light-headedness, passing out, facial drooping, speech difficulty, coordination disturbances, loss of balance, headaches, numbness and tingling.   Hematologic/Lymphatic: Negative for easy bruising/bleeding. Does not bruise/bleed easily.   Psychiatric/Behavioral:  Negative for confusion.      Objective:     Physical Exam  Constitutional:       Appearance: Normal appearance.   HENT:      Head: Normocephalic and atraumatic.      Right Ear: External ear normal.      Left Ear: External ear normal.      Nose: Nose normal. No congestion or rhinorrhea.   Eyes:      Extraocular Movements: Extraocular movements intact.      Conjunctiva/sclera: Conjunctivae normal.   Pulmonary:      Effort: Pulmonary effort is normal. No respiratory distress.   Musculoskeletal:      Cervical back: Normal.      Thoracic back: Normal.      Lumbar back: Spasms and tenderness present. Decreased range of motion. Negative right straight leg raise test and negative left straight leg raise test.        Back:       Right hip: Tenderness and bony tenderness present. No deformity, lacerations or crepitus. Normal range of motion. Normal strength.      Comments: No step-offs appreciated.  TTP R lower lumbar spine & paraspinal musculature.  BLE 5/5 HF, KF, KE, DF, PF, EHL.    BLE DTR 2+ & symmetric.  Near normal ROM with flexion, side bending.  Still dec with extension.  All ROM movements elicit pain.  SLR neg bilaterally.       Skin:     General: Skin is warm.      Findings: No erythema.   Neurological:      General: No focal deficit present.      Mental Status: He is alert and oriented to person, place, and time. Mental status is at baseline.   Psychiatric:         Mood and Affect: Mood normal.         Behavior: Behavior normal.         Thought Content: Thought content normal.         Judgment: Judgment normal.        Assessment:      1. Acute myofascial strain of lumbar region, subsequent encounter    2. Lumbar  contusion, subsequent encounter    3. Encounter related to worker's compensation claim      Plan:     Advised tylenol 1-2 times per day in addition to the flexeril PRN.  Continue PT.  Continue daily soaks & stretches at home.  Follow up in 3 weeks to allow time for PT.  Continue work restrictions.       Patient Instructions: Continue Physical Therapy, Daily home exercises/warm soaks   Restrictions: No lifting/pushing/pulling more than 10 lbs, Avoid climbing/kneeling/squatting, Avoid frequent bending/lifting/twisting  Follow up in about 3 weeks (around 10/8/2024).

## 2024-10-08 ENCOUNTER — OFFICE VISIT (OUTPATIENT)
Dept: URGENT CARE | Facility: CLINIC | Age: 54
End: 2024-10-08
Payer: COMMERCIAL

## 2024-10-08 VITALS
SYSTOLIC BLOOD PRESSURE: 156 MMHG | WEIGHT: 235 LBS | DIASTOLIC BLOOD PRESSURE: 96 MMHG | HEIGHT: 64 IN | OXYGEN SATURATION: 100 % | BODY MASS INDEX: 40.12 KG/M2 | RESPIRATION RATE: 19 BRPM | HEART RATE: 69 BPM

## 2024-10-08 DIAGNOSIS — Z02.6 ENCOUNTER RELATED TO WORKER'S COMPENSATION CLAIM: ICD-10-CM

## 2024-10-08 DIAGNOSIS — S39.012D ACUTE MYOFASCIAL STRAIN OF LUMBAR REGION, SUBSEQUENT ENCOUNTER: ICD-10-CM

## 2024-10-08 DIAGNOSIS — S30.0XXD LUMBAR CONTUSION, SUBSEQUENT ENCOUNTER: Primary | ICD-10-CM

## 2024-10-08 PROCEDURE — 99214 OFFICE O/P EST MOD 30 MIN: CPT | Mod: S$GLB,,, | Performed by: SURGERY

## 2024-10-08 NOTE — LETTER
[facility]  [address] [address]  Phone: [phone]  Fax: [phone]  [facility]: [id number]    Pt Name: Zeeshan Patel  Injury Date: 07/31/2024   Employee ID: [id number] Date of Treatment: 10/08/2024   Company: [employer]      Appointment Time: 08:30 AM Arrived: 8:25 AM    Provider: Ann Cabral MD Time Out:9:12 AM      Office Treatment:   1. Lumbar contusion, subsequent encounter    2. Encounter related to worker's compensation claim    3. Acute myofascial strain of lumbar region, subsequent encounter          Patient Instructions: Continue Physical Therapy, Referral to specialist to be scheduled, once authorized (Fax PT notes to [phone]; Get MRI disk from [facility])      Restrictions: No lifting/pushing/pulling more than 10 lbs, Avoid climbing/kneeling/squatting, Avoid frequent bending/lifting/twisting     Return Appointment: 11/6/2024 at 8:30 AM [facility]

## 2024-10-08 NOTE — PROGRESS NOTES
"Subjective:      Patient ID: Zeeshan Patel is a 54 y.o. male.    Chief Complaint: Back Pain    Patient's place of employment - [employer]  Patient's job title - [employer]  Date of Injury - 07/31/24  Body part injured - Back   Current work status per last visit - Not Working   Improved, same, or worse - Same   Pain Scale right now (1-10) -  6/10  SB.    Pt states physical therapy has been helping relief some of the discomfort.         Musculoskeletal:  Positive for abnormal ROM of joint, back pain and muscle ache. Negative for pain with walking ("for long periods") and muscle cramps.   Neurological:  Negative for numbness and tingling.       See MA note above. Begin MD note:    Zeeshan Patel is a 54 y.o. presenting for follow-up of low back injury. He has been feeling a bit better with PT, he is attending it 3x weekly. He feels he can work now but not for long periods. They just recently started to work on lifting with small weights and he notes pain with squatting. He has pain with going form sitting to standing and with extension, he also has pain with laying flat on his back.   He has been out of work as they do not have light duty available.     Objective:     Physical Exam  Vitals and nursing note reviewed.   Constitutional:       General: He is not in acute distress.     Appearance: He is obese. He is not ill-appearing.   HENT:      Head: Normocephalic.   Eyes:      Conjunctiva/sclera: Conjunctivae normal.   Pulmonary:      Effort: Pulmonary effort is normal. No respiratory distress.   Musculoskeletal:      Lumbar back: Tenderness present. Decreased range of motion. Negative right straight leg raise test and negative left straight leg raise test.      Comments: Pain with rising from seated to standing. SLR caused right side lumbar pain bilaterally. Resisted hip abduction and adduction of the right hip is 4/5 with report of increased lumbar pain, left 5/5. He experienced pain to right low " back with attempting to rise for supine. Normal gait.     Skin:     General: Skin is warm.      Coloration: Skin is not pale.   Neurological:      General: No focal deficit present.      Mental Status: He is alert and oriented to person, place, and time.      GCS: GCS eye subscore is 4. GCS verbal subscore is 5. GCS motor subscore is 6.      Motor: Motor function is intact.      Coordination: Coordination is intact.   Psychiatric:         Attention and Perception: Attention normal.         Mood and Affect: Mood normal.         Speech: Speech normal.         Behavior: Behavior normal. Behavior is cooperative.         Thought Content: Thought content normal.        Assessment:      1. Lumbar contusion, subsequent encounter    2. Encounter related to worker's compensation claim    3. Acute myofascial strain of lumbar region, subsequent encounter      Plan:     I reviewed the piror treatment notes related to this injury, MRI was performed in ED on 7/31 ans demonstrates no herniation but some disc bulging, no comment on possible facet arthropathy. Given his persistent pain complaints and weakness I am referring to pain medicine to [facility] for possible injection to support PT progress. He was advised to get MRI disc from hospital system it was originally performed at to help inform treatment plan by pain medicine. Restrictions will continue as previous. Continue PT in the meantime, continuation request sent. Follow-up in 4 weeks.          Diagnoses and plan discussed with the patient, as well as the expected course and duration of symptoms. Risks and benefits of any medication prescribed during this visit was explained, verbal instructions on use given. Clinic/Emergency department return precautions were given, can return to [facility] before scheduled follow-up appointment if notes worsening/aggravation of symptoms. All questions and concerns were addressed prior to discharge. Plan was developed with active input from the  patient and they verbalized understanding of and agreement with the POC.  [facility] was informed of any referrals and relevant orders.  Note was dictated with voice recognition software, please excuse any grammatical errors.    I spent a total of 30 minutes on the day of the visit.  This includes face to face time and non-face to face time preparing to see the patient (e.g. review of medical record), obtaining and/or reviewing separately obtained history, documenting clinical information in the electronic or other health record, independently interpreting results and communicating results to the patient/family/caregiver, or care coordinator.    Patient Instructions: Continue Physical Therapy, Referral to specialist to be scheduled, once authorized (Fax PT notes to [phone]; Get MRI disk from [facility])   Restrictions: No lifting/pushing/pulling more than 10 lbs, Avoid climbing/kneeling/squatting, Avoid frequent bending/lifting/twisting  Follow up in about 4 weeks (around 11/5/2024) for With [facility]ah[facility].

## 2024-10-22 ENCOUNTER — TELEPHONE (OUTPATIENT)
Dept: PAIN MEDICINE | Facility: CLINIC | Age: 54
End: 2024-10-22
Payer: MEDICAID

## 2024-10-22 NOTE — TELEPHONE ENCOUNTER
Staff called to get this patient scheduled for a new patient appointment. Patient stated he did not need another appointment since somebody already got him scheduled for [place].

## 2024-10-31 ENCOUNTER — OFFICE VISIT (OUTPATIENT)
Dept: PAIN MEDICINE | Facility: CLINIC | Age: 54
End: 2024-10-31

## 2024-10-31 VITALS
OXYGEN SATURATION: 98 % | SYSTOLIC BLOOD PRESSURE: 133 MMHG | DIASTOLIC BLOOD PRESSURE: 88 MMHG | TEMPERATURE: 99 F | BODY MASS INDEX: 39.77 KG/M2 | WEIGHT: 231.69 LBS | HEART RATE: 103 BPM | RESPIRATION RATE: 18 BRPM
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
## 2024-10-31 DIAGNOSIS — M48.061 DEGENERATIVE LUMBAR SPINAL STENOSIS: Primary | ICD-10-CM

## 2024-10-31 PROCEDURE — 99214 OFFICE O/P EST MOD 30 MIN: CPT | Mod: PBBFAC | Performed by: ANESTHESIOLOGY

## 2024-10-31 PROCEDURE — 99999 PR PBB SHADOW E&M-EST. PATIENT-LVL IV: CPT | Mod: PBBFAC,,, | Performed by: ANESTHESIOLOGY

## 2024-10-31 RX ORDER — CELECOXIB 100 MG/1
100 CAPSULE ORAL 2 TIMES DAILY
Qty: 60 CAPSULE | Refills: 0 | Status: SHIPPED | OUTPATIENT
Start: 2024-10-31 | End: 2024-10-31

## 2024-10-31 RX ORDER — CELECOXIB 100 MG/1
100 CAPSULE ORAL DAILY PRN
Qty: 30 CAPSULE | Refills: 1 | Status: SHIPPED | OUTPATIENT
Start: 2024-10-31

## 2024-10-31 NOTE — H&P (VIEW-ONLY)
PCP: Andrzej Andujar MD    REFERRING PHYSICIAN: Ann Cabral MD    CHIEF COMPLAINT: lower back pain after work injury    Original HISTORY OF PRESENT ILLNESS: Zeeshan Patel presents to the clinic for the evaluation of the above pain. The pain started July 31st after a fall at work while carrying a washing machine with co-workers. The machine fell backwards onto him.     Original Pain Description:  The pain is located in the lower back and is radiating to the right upper thigh . The pain is described as aching, sharp, shooting, throbbing, and tingling. Exacerbating factors: Sitting, Standing, Bending, Walking, Extension, Flexing, and Lifting. Mitigating factors heat, laying down, massage, medications, physical therapy, and rest. Symptoms interfere with daily activity, sleeping, and work. He states that he can not sleep on his right side due to pain but can on his left. He has been getting nearly 6 hours of sleep a night since the accident and needs to put a pillow in between his legs for comfort. The patient feels like symptoms have been worsening. Patient denies night fever/night sweats, urinary incontinence, bowel incontinence, significant weight loss, significant motor weakness, and loss of sensations.    Original PAIN SCORES:  Best: Pain is 5  Worst: Pain is 9  Current: Pain is 7        10/31/2024    10:00 AM   Last 3 PDI Scores   Pain Disability Index (PDI) 56       INTERVAL HISTORY: (Newest visit at the bottom)   Interval History (Date):       6 weeks of Conservative therapy:  PT: 8/5/24 to current date (three times a week)      Treatments / Medications: (Ice/Heat/NSAIDS/APAP/etc):  Tylenol, Voltaren, flexeril        Interventional Pain Procedures: (Previous injections)  None    Past Medical History:   Diagnosis Date    GERD (gastroesophageal reflux disease)     Ulcer      Past Surgical History:   Procedure Laterality Date    APPENDECTOMY      left shoulder      WRIST ARTHROPLASTY        Social History     Socioeconomic History    Marital status:    Tobacco Use    Smoking status: Every Day    Smokeless tobacco: Never   Substance and Sexual Activity    Alcohol use: No    Drug use: No     Family History   Problem Relation Name Age of Onset    Cancer Mother      Kidney disease Mother      Hypertension Mother      Hypertension Father      Hypertension Maternal Aunt thyroid cancer     Diabetes Maternal Grandfather colon        Review of patient's allergies indicates:  No Known Allergies    Current Outpatient Medications   Medication Sig    aspirin (ECOTRIN) 81 MG EC tablet Take 1 tablet by mouth once daily.    carbamide peroxide (DEBROX) 6.5 % otic solution Place 5 drops into both ears as needed. (Patient not taking: Reported on 8/5/2024)    celecoxib (CELEBREX) 100 MG capsule Take 1 capsule (100 mg total) by mouth 2 (two) times daily.    diclofenac sodium (VOLTAREN) 1 % Gel External for 25 Days    famotidine (PEPCID) 40 MG tablet Take 40 mg by mouth 2 (two) times daily.    fluticasone (FLONASE) 50 mcg/actuation nasal spray 1 spray (50 mcg total) by Each Nare route 2 (two) times daily as needed for Rhinitis. (Patient not taking: Reported on 8/5/2024)    losartan-hydrochlorothiazide 50-12.5 mg (HYZAAR) 50-12.5 mg per tablet 1 tablet Orally Once a day for 90 days    metFORMIN (GLUCOPHAGE-XR) 500 MG ER 24hr tablet TOME MARISA TABLETA TODOS LOS D AS WITH EVENING MEAL FOR 90 DAYS    pantoprazole (PROTONIX) 40 MG tablet Take 40 mg by mouth every morning.    pravastatin (PRAVACHOL) 40 MG tablet Take 40 mg by mouth.    triamcinolone acetonide 0.1% (KENALOG) 0.1 % cream Apply topically 2 (two) times daily.     No current facility-administered medications for this visit.       ROS:  GENERAL: No fever. No chills. No fatigue. Denies weight loss. Denies weight gain.  HEENT: Denies headaches. Denies vision change. Denies eye pain. Denies double vision. Denies ear pain.   CV: Denies chest pain.   PULM: Denies  of shortness of breath.  GI: Denies constipation. No diarrhea. No abdominal pain. Denies nausea. Denies vomiting. No blood in stool.  HEME: Denies bleeding problems.  : Denies urgency. No painful urination. No blood in urine.  MS: Denies joint stiffness. Denies joint swelling. Endorses lower back pain that radiates to the right thigh.  SKIN: Denies rash.   NEURO: Denies seizures. No weakness.  PSYCH:  Denies difficulty sleeping. No anxiety. Denies depression. No suicidal thoughts.       VITALS:   Vitals:    10/31/24 0959   BP: 133/88   Pulse: 103   Resp: 18   Temp: 98.8 °F (37.1 °C)   SpO2: 98%   Weight: 105.1 kg (231 lb 11.3 oz)   PainSc:   8         PHYSICAL EXAM:   GENERAL: Well appearing, in no acute distress, alert and oriented x3.  PSYCH:  Mood and affect appropriate.  SKIN: Skin color, texture, turgor normal, no rashes or lesions.  HEENT:  Normocephalic, atraumatic. Cranial nerves grossly intact.  NECK: No pain to palpation over the cervical paraspinous muscles. No pain to palpation over facets. No pain with neck flexion, extension, or lateral flexion.   PULM: No evidence of respiratory difficulty, symmetric chest rise.  GI:  Non-distended  BACK: limited range of motion due to pain. pain to palpation over the spinous processes of the L 3-5 region. There is pain with facet joint loading BL. There is pain with palpation over the RIGHT sacroiliac joint but not on the LEFT.   EXTREMITIES: No deformities, edema, or skin discoloration.   MUSCULOSKELETAL: Shoulder, hip, and knee provocative maneuvers are negative. No atrophy is noted.  NEURO: Sensation is equal and appropriate bilaterally. Bilateral upper and lower extremity strength is normal and symmetric. Bilateral upper and lower extremity coordination and muscle stretch reflexes are physiologic and symmetric. Plantar response are downgoing. Straight leg raising in the supine position is negative to radicular pain.   GAIT: normal.      LABS:  BUN/Cr:  13/0.9    IMAGIN/31/24 --- MRI LUMBAR SPINE     Clinical data: Back pain.     Procedure:  Sagittal and axial, multi-sequence MR images through the lumbar spine were obtained.     Findings:   The alignment, vertebral body heights and marrow signal intensity are normal.   The conus medullaris terminates at the normal level and is normal in signal intensity.        T12/L1/L2: Minimal central disc bulge with no stenosis.   L2/L3: Small right paracentral disc protrusion with no spinal stenosis or neural foramina stenosis.   L3/L4: Diffuse disc bulge with mild to moderate bilateral neural foramina stenosis.   L4/L5: Diffuse disc bulge with mild to moderate bilateral neural foramina stenosis.   L5/S1: No disc herniation or stenosis.     CT LUMBAR SPINE   Findings:   There is a congenitally narrow canal, on the basis of short pedicles. There are superimposed extradural degenerative changes from L2-3 through L4-5. There is multilevel degenerative disc disease.   The alignment and vertebral body heights are normal.  There is no evidence of fracture or subluxation.  Sagittal coronal reformatted images demonstrate no subluxation, or further findings.     24 --- XR HIP WITH PELVIS WHEN PERFORMED 2 OR 3 VIEWS RIGHT     CLINICAL HISTORY:  R hip pain s/p fall; Pain in right hip     TECHNIQUE:  AP view of the pelvis and frog leg lateral view of the right hip were performed.     COMPARISON:  None     FINDINGS:  No fracture or dislocation.  No regional soft tissue abnormalities.     Incidentally noted is lower lumbar degenerative change.     Impression:     Unremarkable appearance of the right hip.        ASSESSMENT: 54 y.o. year old male with pain, consistent with:    Encounter Diagnoses   Name Primary?    Degenerative lumbar spinal stenosis Yes    Fall (on) (from) other stairs and steps, sequela        DISCUSSION: Zeeshan Patel is a Thai-speaking man who comes to us after a fall. He was carrying a washing  machine up some stairs and fell backwards with the machine landing on top of him. Despite ongoing PT he continues to have back pain radiating to the right leg which is worst with extension and flexion as well as axial loading. Imaging shows L3/4 and 4/L5 bulge with mild to moderate bilateral neural foramina stenosis. Exam shows limited range of motion due to pain and pain to palpation over the spinous processes of the L 3-5 region.     PLAN:  Reviewed prior imaging  Continue PT  Start celebrex 100 mg QD PRN (will use sparingly due to a history of gastric ulcers)  Schedule FABY of the L5/S1 level (To the Right)  RTC after procedure      I would like to thank Ann Cabral MD for the opportunity to assist in the care of this patient. We had a very nice visit and I look forward to continuing their care. Please let me know if I can be of further assistance.     Amanda Lewis  10/31/2024

## 2024-11-06 ENCOUNTER — OFFICE VISIT (OUTPATIENT)
Dept: URGENT CARE | Facility: CLINIC | Age: 54
End: 2024-11-06
Payer: COMMERCIAL

## 2024-11-06 VITALS
SYSTOLIC BLOOD PRESSURE: 145 MMHG | DIASTOLIC BLOOD PRESSURE: 90 MMHG | RESPIRATION RATE: 18 BRPM | WEIGHT: 231 LBS | OXYGEN SATURATION: 97 % | HEIGHT: 64 IN | HEART RATE: 80 BPM | BODY MASS INDEX: 39.44 KG/M2

## 2024-11-06 DIAGNOSIS — S30.0XXD LUMBAR CONTUSION, SUBSEQUENT ENCOUNTER: ICD-10-CM

## 2024-11-06 DIAGNOSIS — Z02.6 ENCOUNTER RELATED TO WORKER'S COMPENSATION CLAIM: ICD-10-CM

## 2024-11-06 DIAGNOSIS — S39.012D ACUTE MYOFASCIAL STRAIN OF LUMBAR REGION, SUBSEQUENT ENCOUNTER: Primary | ICD-10-CM

## 2024-11-06 PROCEDURE — 99213 OFFICE O/P EST LOW 20 MIN: CPT | Mod: S$GLB,,, | Performed by: PHYSICIAN ASSISTANT

## 2024-11-06 RX ORDER — CYCLOBENZAPRINE HCL 10 MG
10 TABLET ORAL 2 TIMES DAILY PRN
Qty: 30 TABLET | Refills: 0 | Status: SHIPPED | OUTPATIENT
Start: 2024-11-06 | End: 2024-11-21

## 2024-11-06 RX ORDER — METFORMIN HYDROCHLORIDE 500 MG/1
TABLET, EXTENDED RELEASE ORAL
COMMUNITY

## 2024-11-06 NOTE — PROGRESS NOTES
Subjective:      Patient ID: Zeeshan Patel is a 54 y.o. male.    Chief Complaint: Back Pain    Mr. Griffin Patel presents for follow up of low back injury, DOI 7/31/24.  He works in maintenance at [employer].  He reports his pain has improved with PT.  He continues to have R sided LBP that radiates into the hip and intermittently down the back of the leg to the knee.  He denies any paresthesias, weakness, saddle anesthesia or B/B dysfunction.  He was seen by pain management and is scheduled for FABY on 11/19/24.  He was prescribed celebrex by pain management, but cannot tolerate it due to GI upset.  He is taking tylenol and using flexeril intermittently, which give him relief.    See MA note below.  Patient's place of employment - [employer]  Patient's job title - Gen Maintenance  Date of Injury - 07-31-24  Body part injured - Back  Current work status per last visit -  No lifting/pushing/pulling more than 10 lbs, Avoid climbing/kneeling/squatting, Avoid frequent bending/lifting/twisting  Improved, same, or worse - Improved  Pain Scale right now (1-10) -  6/10    Back Pain  Pertinent negatives include no fever or numbness.       Constitution: Negative for activity change, appetite change, chills and fever.   HENT:  Negative for ear pain, ear discharge and congestion.    Eyes:  Negative for eye discharge and eye redness.   Respiratory:  Negative for cough.    Gastrointestinal:  Negative for vomiting and diarrhea.   Genitourinary:  Negative for hematuria.   Musculoskeletal:  Positive for pain, abnormal ROM of joint, back pain, pain with walking and muscle ache. Negative for trauma, joint pain, joint swelling and muscle cramps.   Skin:  Negative for rash, erythema, bruising and hives.   Allergic/Immunologic: Negative for hives and sneezing.   Neurological:  Negative for passing out, numbness, tingling and seizures.     Objective:     Physical Exam  Constitutional:       Appearance: Normal  appearance.   HENT:      Head: Normocephalic and atraumatic.      Right Ear: External ear normal.      Left Ear: External ear normal.      Nose: Nose normal. No congestion or rhinorrhea.   Eyes:      Extraocular Movements: Extraocular movements intact.      Conjunctiva/sclera: Conjunctivae normal.   Pulmonary:      Effort: Pulmonary effort is normal. No respiratory distress.   Musculoskeletal:      Cervical back: Normal.      Thoracic back: Normal.      Lumbar back: Spasms and tenderness present. Decreased range of motion. Negative right straight leg raise test and negative left straight leg raise test.        Back:       Right hip: Tenderness and bony tenderness present. No deformity, lacerations or crepitus. Normal range of motion. Normal strength.      Comments: No step-offs appreciated.  TTP R lower lumbar spine & paraspinal musculature.  BLE 5/5 HF, KF, KE, DF, PF, EHL.    BLE DTR 2+ & symmetric.  Near normal ROM with flexion, side bending.  Still dec with extension.  All ROM movements elicit pain.  SLR neg bilaterally.       Skin:     General: Skin is warm.      Findings: No erythema.   Neurological:      General: No focal deficit present.      Mental Status: He is alert and oriented to person, place, and time. Mental status is at baseline.   Psychiatric:         Mood and Affect: Mood normal.         Behavior: Behavior normal.         Thought Content: Thought content normal.         Judgment: Judgment normal.        Assessment:      1. Acute myofascial strain of lumbar region, subsequent encounter    2. Lumbar contusion, subsequent encounter    3. Encounter related to worker's compensation claim      Plan:     Rx for flexeril refilled.  I discussed precautions of no driving while taking this medication & do not take within 8 hours of the work shift, as the medication may cause drowsiness.  He will continue PT.  Continue work restrictions.  Follow up after FABY.    Medications Ordered This Encounter    Medications    cyclobenzaprine (FLEXERIL) 10 MG tablet     Sig: Take 1 tablet (10 mg total) by mouth 2 (two) times daily as needed for Muscle spasms.     Dispense:  30 tablet     Refill:  0     Patient Instructions: Attention not to aggravate affected area, Continue Physical Therapy (Pain management FABY scheduled 11/19)   Restrictions: No lifting/pushing/pulling more than 10 lbs, Avoid climbing/kneeling/squatting, Avoid frequent bending/lifting/twisting  Follow up in about 29 days (around 12/5/2024).

## 2024-11-06 NOTE — LETTER
[facility]  [address] [address]  Phone: [phone]  Fax: [phone]  [facility]: [id number]    Pt Name: Zeeshan Patel  Injury Date: 07/31/2024   Employee ID: [id number] Date of Treatment: 11/06/2024   Company: [employer]      Appointment Time: 08:15 AM Arrived: 8:25 AM   Provider: Rajwinder Higginbotham PA-C Time Out:9:15 AM     Office Treatment:   1. Acute myofascial strain of lumbar region, subsequent encounter    2. Lumbar contusion, subsequent encounter    3. Encounter related to worker's compensation claim      Medications Ordered This Encounter   Medications    cyclobenzaprine (FLEXERIL) 10 MG tablet          Patient Instructions: Attention not to aggravate affected area, Continue Physical Therapy (Pain management FABY scheduled 11/19)          Restrictions: No lifting/pushing/pulling more than 10 lbs, Avoid climbing/kneeling/squatting, Avoid frequent bending/lifting/twisting     Return Appointment: 12/5/2024 at 8:30 AM    KW

## 2024-11-12 ENCOUNTER — PATIENT MESSAGE (OUTPATIENT)
Dept: ADMINISTRATIVE | Facility: OTHER | Age: 54
End: 2024-11-12
Payer: COMMERCIAL

## 2024-11-19 ENCOUNTER — HOSPITAL ENCOUNTER (OUTPATIENT)
Facility: OTHER | Age: 54
Discharge: HOME | End: 2024-11-19
Attending: ANESTHESIOLOGY | Admitting: ANESTHESIOLOGY
Payer: COMMERCIAL

## 2024-11-19 ENCOUNTER — PATIENT MESSAGE (OUTPATIENT)
Dept: ADMINISTRATIVE | Facility: OTHER | Age: 54
End: 2024-11-19
Payer: COMMERCIAL

## 2024-11-19 VITALS
TEMPERATURE: 98 F | WEIGHT: 231 LBS | DIASTOLIC BLOOD PRESSURE: 78 MMHG | SYSTOLIC BLOOD PRESSURE: 120 MMHG | HEART RATE: 93 BPM | BODY MASS INDEX: 39.44 KG/M2 | RESPIRATION RATE: 18 BRPM | HEIGHT: 64 IN | OXYGEN SATURATION: 93 %

## 2024-11-19 DIAGNOSIS — G89.29 CHRONIC PAIN: ICD-10-CM

## 2024-11-19 DIAGNOSIS — M54.16 LUMBAR RADICULOPATHY: Primary | ICD-10-CM

## 2024-11-19 PROCEDURE — 62323 NJX INTERLAMINAR LMBR/SAC: CPT | Performed by: ANESTHESIOLOGY

## 2024-11-19 PROCEDURE — 63600175 PHARM REV CODE 636 W HCPCS: Performed by: ANESTHESIOLOGY

## 2024-11-19 PROCEDURE — 25500020 PHARM REV CODE 255: Performed by: ANESTHESIOLOGY

## 2024-11-19 PROCEDURE — 62323 NJX INTERLAMINAR LMBR/SAC: CPT | Mod: ,,, | Performed by: ANESTHESIOLOGY

## 2024-11-19 RX ORDER — SODIUM CHLORIDE 9 MG/ML
INJECTION, SOLUTION INTRAVENOUS CONTINUOUS
Status: DISCONTINUED | OUTPATIENT
Start: 2024-11-19 | End: 2024-11-19 | Stop reason: HOSPADM

## 2024-11-19 RX ORDER — LIDOCAINE HYDROCHLORIDE 20 MG/ML
INJECTION, SOLUTION INFILTRATION; PERINEURAL
Status: DISCONTINUED | OUTPATIENT
Start: 2024-11-19 | End: 2024-11-19 | Stop reason: HOSPADM

## 2024-11-19 RX ORDER — MIDAZOLAM HYDROCHLORIDE 1 MG/ML
INJECTION INTRAMUSCULAR; INTRAVENOUS
Status: DISCONTINUED | OUTPATIENT
Start: 2024-11-19 | End: 2024-11-19 | Stop reason: HOSPADM

## 2024-11-19 RX ORDER — DEXAMETHASONE SODIUM PHOSPHATE 10 MG/ML
INJECTION INTRAMUSCULAR; INTRAVENOUS
Status: DISCONTINUED | OUTPATIENT
Start: 2024-11-19 | End: 2024-11-19 | Stop reason: HOSPADM

## 2024-11-19 RX ORDER — LIDOCAINE HYDROCHLORIDE 10 MG/ML
INJECTION, SOLUTION EPIDURAL; INFILTRATION; INTRACAUDAL; PERINEURAL
Status: DISCONTINUED | OUTPATIENT
Start: 2024-11-19 | End: 2024-11-19 | Stop reason: HOSPADM

## 2024-11-19 RX ORDER — FENTANYL CITRATE 50 UG/ML
INJECTION, SOLUTION INTRAMUSCULAR; INTRAVENOUS
Status: DISCONTINUED | OUTPATIENT
Start: 2024-11-19 | End: 2024-11-19 | Stop reason: HOSPADM

## 2024-11-19 NOTE — OP NOTE
Lumbar Interlaminar Epidural Steroid Injection under Fluoroscopic Guidance    The procedure, risks, benefits, and options were discussed with the patient. There are no contraindications to the procedure. The patent expressed understanding and agreed to the procedure. Informed written consent was obtained prior to the start of the procedure and can be found in the patient's chart.    PATIENT NAME: Zeeshan Patel   MRN: [id number]     DATE OF PROCEDURE: 11/19/2024    PROCEDURE: Lumbar Interlaminar Epidural Steroid Injection L5/S1 under Fluoroscopic Guidance    PRE-OP DIAGNOSIS: Degenerative lumbar spinal stenosis [M48.061] Lumbar radiculopathy [M54.16]    POST-OP DIAGNOSIS: Same    PHYSICIAN: Amanda Lewis MD    ASSISTANTS: Patel Palacios MD     MEDICATIONS INJECTED: Preservative-free Decadron 10mg with 4cc of Lidocaine 1% MPF and preservative free normal saline    LOCAL ANESTHETIC INJECTED: Xylocaine 2%     SEDATION: Versed 2mg and Fentanyl 50mcg                                                                                                                                                                                     Conscious sedation ordered by M.D. Patient re-evaluation prior to administration of conscious sedation. No changes noted in patient's status from initial evaluation. The patient's vital signs were monitored by RN and patient remained hemodynamically stable throughout the procedure.    Event Time In   Sedation Start 0836   Sedation End 0846       ESTIMATED BLOOD LOSS: None    COMPLICATIONS: None    TECHNIQUE: Time-out was performed to identify the patient and procedure to be performed. With the patient laying in a prone position, the surgical area was prepped and draped in the usual sterile fashion using ChloraPrep and a fenestrated drape. The level was determined under fluoroscopy guidance. Skin anesthesia was achieved by injecting Lidocaine 2% over the injection site. The interlaminar space  was then approached with a 18 gauge,  3.5 inch Tuohy needle that was introduced under fluoroscopic guidance in the AP, lateral and/or contralateral oblique imaging. Once the Ligamentum flavum was encountered loss of resistance to air was used to enter the epidural space. With positive loss of resistance and negative aspiration for CSF or Blood, contrast dye Omnipaque (300mg/mL) was injected to confirm placement and there was no vascular runoff. 5 mL of the medication mixture listed above was injected slowly. Displacement of the radio opaque contrast after injection of the medication confirmed that the medication went into the area of the epidural space. The needles were removed and bleeding was nil. A sterile dressing was applied. No specimens collected. The patient tolerated the procedure well.     The patient was monitored after the procedure in the recovery area. They were given post-procedure and discharge instructions to follow at home. The patient was discharged in a stable condition.    I reviewed and edited the fellow's note. I conducted my own interview and physical examination. I agree with the findings. I was present and supervising all critical portions of the procedure.    Patel Palacios MD

## 2024-11-19 NOTE — DISCHARGE SUMMARY
Discharge Note  Short Stay      SUMMARY     Admit Date: 11/19/2024    Attending Physician: Patel Palacios      Discharge Physician: Patel Palacios      Discharge Date: 11/19/2024 8:48 AM    Procedure(s) (LRB):  LUMBAR L5/S1 FABY (TO THE RIGHT) (N/A)    Final Diagnosis: Degenerative lumbar spinal stenosis [M48.061]    Disposition: Home or self care    Patient Instructions:   Current Discharge Medication List        CONTINUE these medications which have NOT CHANGED    Details   celecoxib (CELEBREX) 100 MG capsule Take 1 capsule (100 mg total) by mouth daily as needed for Pain.  Qty: 30 capsule, Refills: 1    Associated Diagnoses: Degenerative lumbar spinal stenosis      cyclobenzaprine (FLEXERIL) 10 MG tablet Take 1 tablet (10 mg total) by mouth 2 (two) times daily as needed for Muscle spasms.  Qty: 30 tablet, Refills: 0      famotidine (PEPCID) 40 MG tablet Take 40 mg by mouth 2 (two) times daily.      losartan-hydrochlorothiazide 50-12.5 mg (HYZAAR) 50-12.5 mg per tablet 1 tablet Orally Once a day for 90 days      !! metFORMIN (GLUCOPHAGE-XR) 500 MG ER 24hr tablet TOME MARISA TABLETA TODOS LOS D AS WITH EVENING MEAL FOR 90 DAYS      !! metFORMIN (GLUCOPHAGE-XR) 500 MG ER 24hr tablet 1 tablet with evening meal Orally Once a day for 90 days      pantoprazole (PROTONIX) 40 MG tablet Take 40 mg by mouth every morning.      pravastatin (PRAVACHOL) 40 MG tablet Take 40 mg by mouth.      triamcinolone acetonide 0.1% (KENALOG) 0.1 % cream Apply topically 2 (two) times daily.       !! - Potential duplicate medications found. Please discuss with provider.              Discharge Diagnosis: Degenerative lumbar spinal stenosis [M48.061]  Condition on Discharge: Stable with no complications to procedure   Diet on Discharge: Same as before.  Activity: as per instruction sheet.  Discharge to: Home with a responsible adult.  Follow up: 2-4 weeks       Please call my office or pager at [phone] if experienced any weakness or loss of  sensation, fever > 101.5, pain uncontrolled with oral medications, persistent nausea/vomiting/or diarrhea, redness or drainage from the incisions, or any other worrisome concerns. If physician on call was not reached or could not communicate with our office for any reason please go to the nearest emergency department

## 2024-11-19 NOTE — DISCHARGE INSTRUCTIONS

## 2024-12-05 ENCOUNTER — OFFICE VISIT (OUTPATIENT)
Dept: URGENT CARE | Facility: CLINIC | Age: 54
End: 2024-12-05
Payer: COMMERCIAL

## 2024-12-05 VITALS
SYSTOLIC BLOOD PRESSURE: 144 MMHG | HEART RATE: 87 BPM | OXYGEN SATURATION: 98 % | HEIGHT: 64 IN | WEIGHT: 231 LBS | TEMPERATURE: 98 F | RESPIRATION RATE: 18 BRPM | BODY MASS INDEX: 39.44 KG/M2 | DIASTOLIC BLOOD PRESSURE: 97 MMHG

## 2024-12-05 DIAGNOSIS — S30.0XXD LUMBAR CONTUSION, SUBSEQUENT ENCOUNTER: ICD-10-CM

## 2024-12-05 DIAGNOSIS — S39.012D ACUTE MYOFASCIAL STRAIN OF LUMBAR REGION, SUBSEQUENT ENCOUNTER: Primary | ICD-10-CM

## 2024-12-05 DIAGNOSIS — Z02.6 ENCOUNTER RELATED TO WORKER'S COMPENSATION CLAIM: ICD-10-CM

## 2024-12-05 NOTE — LETTER
[facility]  [address] [address]  Phone: [phone]  Fax: [phone]  [facility]: [id number]    Pt Name: Zeeshan Patel  Injury Date: 07/31/2024   Employee ID: [id number] Date of Treatment: 12/05/2024   Company: [employer]      Appointment Time: 08:30 AM Arrived: 8:22 AM    Provider: Rajwinder Higginbotham PA-C Time Out:9:18 AM      Office Treatment:   1. Acute myofascial strain of lumbar region, subsequent encounter    2. Lumbar contusion, subsequent encounter    3. Encounter related to worker's compensation claim          Patient Instructions: Attention not to aggravate affected area, Begin Physical Therapy, PT to be scheduled once authorized, Daily home exercises/warm soaks      Restrictions: No lifting/pushing/pulling more than 10 lbs, Avoid frequent bending/lifting/twisting, Avoid climbing/kneeling/squatting     Return Appointment: 1/6/2025 at 8:30 AM BASSAM

## 2024-12-05 NOTE — PROGRESS NOTES
Subjective:      Patient ID: Zeeshan Patel is a 54 y.o. male.    Chief Complaint: Back Injury    Mr. Griffin Patel presents for follow up of low back injury, DOI 7/31/24.  He works in maintenance at [employer].  He continues to have LBP that is worse on the right and radiates into the right lateral leg and hamstring area.  He also complains of right hip pain.  He denies any paresthesias, weakness, saddle anesthesia or B/B dysfunction.  He had L5-S1 FABY 11/19/24 with pain management and reports it did give him improvement in his back pain.  He is scheduled to follow up with PM next week.  Since his last visit, he has finished his course of PT, but would like to continue PT, as it was really helping with his ROM.  He is taking flexeril, but only at night.  He stopped taking NSAIDs due to reflux.  He did F/U with his PCP for HTN and his medication was increased.  His BP has improved since the medication change.  His job continues to not accommodate light duty restrictions.    Patient's place of employment - [employer]  Patient's job title - [employer] Maintenance  Date of Injury - 07-31-24  Body part injured - Back  Current work status per last visit -  No lifting/pushing/pulling more than 10 lbs, Avoid climbing/kneeling/squatting, Avoid frequent bending/lifting/twisting  Improved, same, or worse - Improved  Pain Scale right now (1-10) -  3/10    Patient states felling pressure in lower back with standing and sitting at work for a prolonged time.    Back Pain  This is a new problem. The current episode started 1 to 4 weeks ago. The problem occurs intermittently. The problem has been gradually improving since onset. The pain is present in the lumbar spine. The quality of the pain is described as stabbing. The pain does not radiate. The pain is at a severity of 3/10. The pain is mild. The pain is The same all the time. The symptoms are aggravated by sitting, standing, bending and twisting. Stiffness  is present All day. Pertinent negatives include no fever or numbness.       Constitution: Negative for activity change, appetite change, chills and fever.   HENT:  Negative for ear pain, ear discharge and congestion.    Eyes:  Negative for eye discharge and eye redness.   Respiratory:  Negative for cough.    Gastrointestinal:  Negative for vomiting and diarrhea.   Genitourinary:  Negative for hematuria.   Musculoskeletal:  Positive for pain, abnormal ROM of joint, back pain, pain with walking and muscle ache. Negative for trauma, joint pain, joint swelling and muscle cramps.   Skin:  Negative for rash, erythema, bruising and hives.   Allergic/Immunologic: Negative for hives and sneezing.   Neurological:  Negative for passing out, numbness, tingling and seizures.     Objective:     Physical Exam  Constitutional:       Appearance: Normal appearance.   HENT:      Head: Normocephalic and atraumatic.      Right Ear: External ear normal.      Left Ear: External ear normal.      Nose: Nose normal. No congestion or rhinorrhea.   Eyes:      Extraocular Movements: Extraocular movements intact.      Conjunctiva/sclera: Conjunctivae normal.   Pulmonary:      Effort: Pulmonary effort is normal. No respiratory distress.   Musculoskeletal:      Cervical back: Normal.      Thoracic back: Normal.      Lumbar back: Spasms and tenderness present. Decreased range of motion. Negative right straight leg raise test and negative left straight leg raise test.        Back:       Right hip: Tenderness and bony tenderness present. No deformity, lacerations or crepitus. Normal range of motion. Normal strength.      Comments: No step-offs appreciated.  TTP R lower lumbar spine & paraspinal musculature.  BLE 5/5 HF, KF, KE, DF, PF, EHL.    BLE DTR 2+ & symmetric.  Near normal ROM with flexion, side bending.  Still dec with extension.  R side bending & extension are most painful.  SLR neg bilaterally.       Skin:     General: Skin is warm.       Findings: No erythema.   Neurological:      General: No focal deficit present.      Mental Status: He is alert and oriented to person, place, and time. Mental status is at baseline.   Psychiatric:         Mood and Affect: Mood normal.         Behavior: Behavior normal.         Thought Content: Thought content normal.         Judgment: Judgment normal.      Assessment:      1. Acute myofascial strain of lumbar region, subsequent encounter    2. Lumbar contusion, subsequent encounter    3. Encounter related to worker's compensation claim      Plan:     I have placed an order for another course of PT, as he demonstrated much better ROM after he started attending PT.  F/U with PM as scheduled next week.  Continue flexeril PRN, pt does not need refills at this time.  I discussed precautions of no driving while taking this medication & do not take within 8 hours of the work shift, as the medication may cause drowsiness.  Follow up in 1 month to allow time for PT approval and scheduling, PM F/U.     Patient Instructions: Attention not to aggravate affected area, Begin Physical Therapy, PT to be scheduled once authorized, Daily home exercises/warm soaks   Restrictions: No lifting/pushing/pulling more than 10 lbs, Avoid frequent bending/lifting/twisting, Avoid climbing/kneeling/squatting  Follow up in about 1 month (around 1/6/2025).

## 2024-12-09 ENCOUNTER — PATIENT MESSAGE (OUTPATIENT)
Dept: PAIN MEDICINE | Facility: OTHER | Age: 54
End: 2024-12-09
Payer: COMMERCIAL

## 2024-12-09 ENCOUNTER — OFFICE VISIT (OUTPATIENT)
Dept: PAIN MEDICINE | Facility: CLINIC | Age: 54
End: 2024-12-09
Payer: COMMERCIAL

## 2024-12-09 VITALS
DIASTOLIC BLOOD PRESSURE: 99 MMHG | WEIGHT: 238.13 LBS | HEART RATE: 83 BPM | BODY MASS INDEX: 40.65 KG/M2 | OXYGEN SATURATION: 100 % | RESPIRATION RATE: 12 BRPM | SYSTOLIC BLOOD PRESSURE: 148 MMHG | HEIGHT: 64 IN

## 2024-12-09 DIAGNOSIS — M79.18 MYOFASCIAL PAIN: Primary | ICD-10-CM

## 2024-12-09 DIAGNOSIS — M46.1 SACROILIITIS: ICD-10-CM

## 2024-12-09 DIAGNOSIS — M62.838 MUSCLE SPASM: Primary | ICD-10-CM

## 2024-12-09 DIAGNOSIS — M54.16 LUMBAR RADICULOPATHY: ICD-10-CM

## 2024-12-09 DIAGNOSIS — M79.18 MYOFASCIAL PAIN: ICD-10-CM

## 2024-12-09 DIAGNOSIS — G57.01 PIRIFORMIS SYNDROME OF RIGHT SIDE: ICD-10-CM

## 2024-12-09 DIAGNOSIS — G89.4 CHRONIC PAIN SYNDROME: ICD-10-CM

## 2024-12-09 DIAGNOSIS — M70.61 GREATER TROCHANTERIC BURSITIS OF RIGHT HIP: ICD-10-CM

## 2024-12-09 RX ORDER — METHOCARBAMOL 500 MG/1
500 TABLET, FILM COATED ORAL 3 TIMES DAILY PRN
Qty: 90 TABLET | Refills: 0 | Status: SHIPPED | OUTPATIENT
Start: 2024-12-09

## 2024-12-09 NOTE — PROGRESS NOTES
PCP: Andrzej Andujar MD    REFERRING PHYSICIAN: No ref. provider found    CHIEF COMPLAINT: lower back pain after work injury    Original HISTORY OF PRESENT ILLNESS: Zeeshan Patel presents to the clinic for the evaluation of the above pain. The pain started July 31st after a fall at work while carrying a washing machine with co-workers. The machine fell backwards onto him.     Original Pain Description:  The pain is located in the lower back and is radiating to the right upper thigh . The pain is described as aching, sharp, shooting, throbbing, and tingling. Exacerbating factors: Sitting, Standing, Bending, Walking, Extension, Flexing, and Lifting. Mitigating factors heat, laying down, massage, medications, physical therapy, and rest. Symptoms interfere with daily activity, sleeping, and work. He states that he can not sleep on his right side due to pain but can on his left. He has been getting nearly 6 hours of sleep a night since the accident and needs to put a pillow in between his legs for comfort. The patient feels like symptoms have been worsening. Patient denies night fever/night sweats, urinary incontinence, bowel incontinence, significant weight loss, significant motor weakness, and loss of sensations.    Original PAIN SCORES:  Best: Pain is 5  Worst: Pain is 9  Current: Pain is 7        12/9/2024     8:21 AM   Last 3 PDI Scores   Pain Disability Index (PDI) 49       INTERVAL HISTORY: (Newest visit at the bottom)   Interval History (12/9/2024):    [id number] utilized during today's visit    Zeeshan Patel returns to clinic for follow-up after L5/S1 ILESI to the right on 11/19/2024. He reports 50% relief of back pain. He continues with right lower back pain and right lateral hip pain. The pain in the lower back is worse with walking, sitting, standing. He has taken Celebrex and Flexeril, however, these caused stomach upset. He has a history of gastric ulcer. He discontinued  these. He is still seeing  and will be starting PT again as this provided good relief. He denies recent health changes. He denies recent falls or trauma. He denies new onset fever/night sweats, urinary incontinence, bowel incontinence, significant weight changes, significant motor weakness or changes, or loss of sensations. His pain today is 4/10.      6 weeks of Conservative therapy:  PT: 8/5/24 to current date (three times a week)      Treatments / Medications: (Ice/Heat/NSAIDS/APAP/etc):  Tylenol, Voltaren, flexeril      Interventional Pain Procedures: (Previous injections)  11/19/2024 - L5/S1 ILESI (to the right) - 50% relief of back pain    Past Medical History:   Diagnosis Date    GERD (gastroesophageal reflux disease)     Ulcer      Past Surgical History:   Procedure Laterality Date    APPENDECTOMY      EPIDURAL STEROID INJECTION N/A 11/19/2024    Procedure: LUMBAR L5/S1 FABY (TO THE RIGHT);  Surgeon: Amanda Lewis MD;  Location: [facility];  Service: Pain Management;  Laterality: N/A;  [phone]  2 WK F/U KELSI    left shoulder      WRIST ARTHROPLASTY       Social History     Socioeconomic History    Marital status:    Tobacco Use    Smoking status: Every Day    Smokeless tobacco: Never   Substance and Sexual Activity    Alcohol use: No    Drug use: No     Family History   Problem Relation Name Age of Onset    Cancer Mother      Kidney disease Mother      Hypertension Mother      Hypertension Father      Hypertension Maternal Aunt thyroid cancer     Diabetes Maternal Grandfather colon        Review of patient's allergies indicates:  No Known Allergies    Current Outpatient Medications   Medication Sig    celecoxib (CELEBREX) 100 MG capsule Take 1 capsule (100 mg total) by mouth daily as needed for Pain.    famotidine (PEPCID) 40 MG tablet Take 40 mg by mouth 2 (two) times daily.    losartan-hydrochlorothiazide 50-12.5 mg (HYZAAR) 50-12.5 mg per tablet 1 tablet Orally Once a day for 90 days     "metFORMIN (GLUCOPHAGE-XR) 500 MG ER 24hr tablet KARIE WELLS LOS D AS WITH EVENING MEAL FOR 90 DAYS    metFORMIN (GLUCOPHAGE-XR) 500 MG ER 24hr tablet 1 tablet with evening meal Orally Once a day for 90 days    pantoprazole (PROTONIX) 40 MG tablet Take 40 mg by mouth every morning.    pravastatin (PRAVACHOL) 40 MG tablet Take 40 mg by mouth.    triamcinolone acetonide 0.1% (KENALOG) 0.1 % cream Apply topically 2 (two) times daily.     No current facility-administered medications for this visit.       ROS:  GENERAL: No fever. No chills. No fatigue. Denies weight loss. Denies weight gain.  HEENT: Denies headaches. Denies vision change. Denies eye pain. Denies double vision. Denies ear pain.   CV: Denies chest pain.   PULM: Denies of shortness of breath.  GI: Denies constipation. No diarrhea. No abdominal pain. Denies nausea. Denies vomiting. No blood in stool.  HEME: Denies bleeding problems.  : Denies urgency. No painful urination. No blood in urine.  MS: Denies joint stiffness. Denies joint swelling. Endorses lower back pain that radiates to the right thigh.  SKIN: Denies rash.   NEURO: Denies seizures. No weakness.  PSYCH:  Denies difficulty sleeping. No anxiety. Denies depression. No suicidal thoughts.       VITALS:   Vitals:    12/09/24 0820   Resp: 12   SpO2: 100%   Weight: 108 kg (238 lb 1.6 oz)   Height: 5' 4" (1.626 m)   PainSc:   4   PainLoc: Back         PHYSICAL EXAM:   GENERAL: Well appearing, in no acute distress, alert and oriented x3.  PSYCH:  Mood and affect appropriate.  SKIN: Skin color, texture, turgor normal, no rashes or lesions.  HEENT:  Normocephalic, atraumatic. Cranial nerves grossly intact.  NECK: No pain to palpation over the cervical paraspinous muscles. No pain to palpation over facets. No pain with neck flexion, extension, or lateral flexion.   PULM: No evidence of respiratory difficulty, symmetric chest rise.  GI:  Non-distended  BACK: Full ROM with pain on extension and " axial loading to the right > flexion. SLR negative in seated position. No pain to palpation over lumbar spine or facet joints bilaterally. Positive axial loading test to the right. Positive tenderness over right SIJ with positive thigh and sacral thrust test, Positive FABERE,Ganselin and Yeoman's test to the right. Negative FADIR   EXTREMITIES: No deformities, edema, or skin discoloration.   MUSCULOSKELETAL: Dion's and FADIR positive to lateral hip and groin pain to the right. Pain to palpation over right piriformis. Pain to palpation over right GTB.  No atrophy is noted.  NEURO: Sensation is equal and appropriate bilaterally. Bilateral upper and lower extremity strength is normal and symmetric. Plantar response are downgoing.    GAIT: normal.      LABS:  BUN/Cr: 13/0.9    IMAGIN/31/24 --- MRI LUMBAR SPINE     Clinical data: Back pain.     Procedure:  Sagittal and axial, multi-sequence MR images through the lumbar spine were obtained.     Findings:   The alignment, vertebral body heights and marrow signal intensity are normal.   The conus medullaris terminates at the normal level and is normal in signal intensity.        T12/L1/L2: Minimal central disc bulge with no stenosis.   L2/L3: Small right paracentral disc protrusion with no spinal stenosis or neural foramina stenosis.   L3/L4: Diffuse disc bulge with mild to moderate bilateral neural foramina stenosis.   L4/L5: Diffuse disc bulge with mild to moderate bilateral neural foramina stenosis.   L5/S1: No disc herniation or stenosis.     CT LUMBAR SPINE   Findings:   There is a congenitally narrow canal, on the basis of short pedicles. There are superimposed extradural degenerative changes from L2-3 through L4-5. There is multilevel degenerative disc disease.   The alignment and vertebral body heights are normal.  There is no evidence of fracture or subluxation.  Sagittal coronal reformatted images demonstrate no subluxation, or further findings.      8/20/24 --- XR HIP WITH PELVIS WHEN PERFORMED 2 OR 3 VIEWS RIGHT     CLINICAL HISTORY:  R hip pain s/p fall; Pain in right hip     TECHNIQUE:  AP view of the pelvis and frog leg lateral view of the right hip were performed.     COMPARISON:  None     FINDINGS:  No fracture or dislocation.  No regional soft tissue abnormalities.     Incidentally noted is lower lumbar degenerative change.     Impression:     Unremarkable appearance of the right hip.        ASSESSMENT: 54 y.o. year old male with pain, consistent with:    Encounter Diagnoses   Name Primary?    Muscle spasm Yes    Sacroiliitis     Myofascial pain     Piriformis syndrome of right side     Greater trochanteric bursitis of right hip     Lumbar radiculopathy     Chronic pain syndrome          DISCUSSION: Zeeshan Patel is a [place]alese-speaking man who comes to us after a fall. He was carrying a washing machine up some stairs and fell backwards with the machine landing on top of him. Despite ongoing PT he continues to have back pain radiating to the right leg which is worst with extension and flexion as well as axial loading. Imaging shows L3/4 and 4/L5 bulge with mild to moderate bilateral neural foramina stenosis. Exam shows limited range of motion due to pain and pain to palpation over the spinous processes of the L 3-5 region.     PLAN:  Reviewed prior imaging  Continue PT  Provided patient with home exercises for piriformis and GTB via AVS. Encouraged daily participation.  D/c NSAIDs given stomach upset (history of gastric ulcers)  Add Methocarbamol 500 mg TID PRN muscle spasms. D/c if stomach upset occurs.  Procedure order placed for right SI joint and right piriformis injection  RTC 2-3 weeks after above procedure.    The above plan and management options were discussed at length with patient. Patient is in agreement with the above and verbalized understanding.     Tsering Franks NP   12/09/2024

## 2024-12-09 NOTE — H&P (VIEW-ONLY)
PCP: Andrzje Andujar MD    REFERRING PHYSICIAN: No ref. provider found    CHIEF COMPLAINT: lower back pain after work injury    Original HISTORY OF PRESENT ILLNESS: Zeeshan Patel presents to the clinic for the evaluation of the above pain. The pain started July 31st after a fall at work while carrying a washing machine with co-workers. The machine fell backwards onto him.     Original Pain Description:  The pain is located in the lower back and is radiating to the right upper thigh . The pain is described as aching, sharp, shooting, throbbing, and tingling. Exacerbating factors: Sitting, Standing, Bending, Walking, Extension, Flexing, and Lifting. Mitigating factors heat, laying down, massage, medications, physical therapy, and rest. Symptoms interfere with daily activity, sleeping, and work. He states that he can not sleep on his right side due to pain but can on his left. He has been getting nearly 6 hours of sleep a night since the accident and needs to put a pillow in between his legs for comfort. The patient feels like symptoms have been worsening. Patient denies night fever/night sweats, urinary incontinence, bowel incontinence, significant weight loss, significant motor weakness, and loss of sensations.    Original PAIN SCORES:  Best: Pain is 5  Worst: Pain is 9  Current: Pain is 7        12/9/2024     8:21 AM   Last 3 PDI Scores   Pain Disability Index (PDI) 49       INTERVAL HISTORY: (Newest visit at the bottom)   Interval History (12/9/2024):    [id number] utilized during today's visit    Zeeshan Patel returns to clinic for follow-up after L5/S1 ILESI to the right on 11/19/2024. He reports 50% relief of back pain. He continues with right lower back pain and right lateral hip pain. The pain in the lower back is worse with walking, sitting, standing. He has taken Celebrex and Flexeril, however, these caused stomach upset. He has a history of gastric ulcer. He discontinued  these. He is still seeing  and will be starting PT again as this provided good relief. He denies recent health changes. He denies recent falls or trauma. He denies new onset fever/night sweats, urinary incontinence, bowel incontinence, significant weight changes, significant motor weakness or changes, or loss of sensations. His pain today is 4/10.      6 weeks of Conservative therapy:  PT: 8/5/24 to current date (three times a week)      Treatments / Medications: (Ice/Heat/NSAIDS/APAP/etc):  Tylenol, Voltaren, flexeril      Interventional Pain Procedures: (Previous injections)  11/19/2024 - L5/S1 ILESI (to the right) - 50% relief of back pain    Past Medical History:   Diagnosis Date    GERD (gastroesophageal reflux disease)     Ulcer      Past Surgical History:   Procedure Laterality Date    APPENDECTOMY      EPIDURAL STEROID INJECTION N/A 11/19/2024    Procedure: LUMBAR L5/S1 FABY (TO THE RIGHT);  Surgeon: Amanda Lewis MD;  Location: [facility];  Service: Pain Management;  Laterality: N/A;  [phone]  2 WK F/U KELSI    left shoulder      WRIST ARTHROPLASTY       Social History     Socioeconomic History    Marital status:    Tobacco Use    Smoking status: Every Day    Smokeless tobacco: Never   Substance and Sexual Activity    Alcohol use: No    Drug use: No     Family History   Problem Relation Name Age of Onset    Cancer Mother      Kidney disease Mother      Hypertension Mother      Hypertension Father      Hypertension Maternal Aunt thyroid cancer     Diabetes Maternal Grandfather colon        Review of patient's allergies indicates:  No Known Allergies    Current Outpatient Medications   Medication Sig    celecoxib (CELEBREX) 100 MG capsule Take 1 capsule (100 mg total) by mouth daily as needed for Pain.    famotidine (PEPCID) 40 MG tablet Take 40 mg by mouth 2 (two) times daily.    losartan-hydrochlorothiazide 50-12.5 mg (HYZAAR) 50-12.5 mg per tablet 1 tablet Orally Once a day for 90 days     "metFORMIN (GLUCOPHAGE-XR) 500 MG ER 24hr tablet KARIE WELLS LOS D AS WITH EVENING MEAL FOR 90 DAYS    metFORMIN (GLUCOPHAGE-XR) 500 MG ER 24hr tablet 1 tablet with evening meal Orally Once a day for 90 days    pantoprazole (PROTONIX) 40 MG tablet Take 40 mg by mouth every morning.    pravastatin (PRAVACHOL) 40 MG tablet Take 40 mg by mouth.    triamcinolone acetonide 0.1% (KENALOG) 0.1 % cream Apply topically 2 (two) times daily.     No current facility-administered medications for this visit.       ROS:  GENERAL: No fever. No chills. No fatigue. Denies weight loss. Denies weight gain.  HEENT: Denies headaches. Denies vision change. Denies eye pain. Denies double vision. Denies ear pain.   CV: Denies chest pain.   PULM: Denies of shortness of breath.  GI: Denies constipation. No diarrhea. No abdominal pain. Denies nausea. Denies vomiting. No blood in stool.  HEME: Denies bleeding problems.  : Denies urgency. No painful urination. No blood in urine.  MS: Denies joint stiffness. Denies joint swelling. Endorses lower back pain that radiates to the right thigh.  SKIN: Denies rash.   NEURO: Denies seizures. No weakness.  PSYCH:  Denies difficulty sleeping. No anxiety. Denies depression. No suicidal thoughts.       VITALS:   Vitals:    12/09/24 0820   Resp: 12   SpO2: 100%   Weight: 108 kg (238 lb 1.6 oz)   Height: 5' 4" (1.626 m)   PainSc:   4   PainLoc: Back         PHYSICAL EXAM:   GENERAL: Well appearing, in no acute distress, alert and oriented x3.  PSYCH:  Mood and affect appropriate.  SKIN: Skin color, texture, turgor normal, no rashes or lesions.  HEENT:  Normocephalic, atraumatic. Cranial nerves grossly intact.  NECK: No pain to palpation over the cervical paraspinous muscles. No pain to palpation over facets. No pain with neck flexion, extension, or lateral flexion.   PULM: No evidence of respiratory difficulty, symmetric chest rise.  GI:  Non-distended  BACK: Full ROM with pain on extension and " axial loading to the right > flexion. SLR negative in seated position. No pain to palpation over lumbar spine or facet joints bilaterally. Positive axial loading test to the right. Positive tenderness over right SIJ with positive thigh and sacral thrust test, Positive FABERE,Ganselin and Yeoman's test to the right. Negative FADIR   EXTREMITIES: No deformities, edema, or skin discoloration.   MUSCULOSKELETAL: Dion's and FADIR positive to lateral hip and groin pain to the right. Pain to palpation over right piriformis. Pain to palpation over right GTB.  No atrophy is noted.  NEURO: Sensation is equal and appropriate bilaterally. Bilateral upper and lower extremity strength is normal and symmetric. Plantar response are downgoing.    GAIT: normal.      LABS:  BUN/Cr: 13/0.9    IMAGIN/31/24 --- MRI LUMBAR SPINE     Clinical data: Back pain.     Procedure:  Sagittal and axial, multi-sequence MR images through the lumbar spine were obtained.     Findings:   The alignment, vertebral body heights and marrow signal intensity are normal.   The conus medullaris terminates at the normal level and is normal in signal intensity.        T12/L1/L2: Minimal central disc bulge with no stenosis.   L2/L3: Small right paracentral disc protrusion with no spinal stenosis or neural foramina stenosis.   L3/L4: Diffuse disc bulge with mild to moderate bilateral neural foramina stenosis.   L4/L5: Diffuse disc bulge with mild to moderate bilateral neural foramina stenosis.   L5/S1: No disc herniation or stenosis.     CT LUMBAR SPINE   Findings:   There is a congenitally narrow canal, on the basis of short pedicles. There are superimposed extradural degenerative changes from L2-3 through L4-5. There is multilevel degenerative disc disease.   The alignment and vertebral body heights are normal.  There is no evidence of fracture or subluxation.  Sagittal coronal reformatted images demonstrate no subluxation, or further findings.      8/20/24 --- XR HIP WITH PELVIS WHEN PERFORMED 2 OR 3 VIEWS RIGHT     CLINICAL HISTORY:  R hip pain s/p fall; Pain in right hip     TECHNIQUE:  AP view of the pelvis and frog leg lateral view of the right hip were performed.     COMPARISON:  None     FINDINGS:  No fracture or dislocation.  No regional soft tissue abnormalities.     Incidentally noted is lower lumbar degenerative change.     Impression:     Unremarkable appearance of the right hip.        ASSESSMENT: 54 y.o. year old male with pain, consistent with:    Encounter Diagnoses   Name Primary?    Muscle spasm Yes    Sacroiliitis     Myofascial pain     Piriformis syndrome of right side     Greater trochanteric bursitis of right hip     Lumbar radiculopathy     Chronic pain syndrome          DISCUSSION: Zeeshan Patel is a [place]-speaking man who comes to us after a fall. He was carrying a washing machine up some stairs and fell backwards with the machine landing on top of him. Despite ongoing PT he continues to have back pain radiating to the right leg which is worst with extension and flexion as well as axial loading. Imaging shows L3/4 and 4/L5 bulge with mild to moderate bilateral neural foramina stenosis. Exam shows limited range of motion due to pain and pain to palpation over the spinous processes of the L 3-5 region.     PLAN:  Reviewed prior imaging  Continue PT  Provided patient with home exercises for piriformis and GTB via AVS. Encouraged daily participation.  D/c NSAIDs given stomach upset (history of gastric ulcers)  Add Methocarbamol 500 mg TID PRN muscle spasms. D/c if stomach upset occurs.  Procedure order placed for right SI joint and right piriformis injection  RTC 2-3 weeks after above procedure.    The above plan and management options were discussed at length with patient. Patient is in agreement with the above and verbalized understanding.     Tsering Franks NP   12/09/2024

## 2024-12-24 ENCOUNTER — PATIENT MESSAGE (OUTPATIENT)
Dept: ADMINISTRATIVE | Facility: OTHER | Age: 54
End: 2024-12-24
Payer: COMMERCIAL

## 2024-12-31 ENCOUNTER — PATIENT MESSAGE (OUTPATIENT)
Dept: ADMINISTRATIVE | Facility: OTHER | Age: 54
End: 2024-12-31

## 2024-12-31 ENCOUNTER — HOSPITAL ENCOUNTER (OUTPATIENT)
Facility: OTHER | Age: 54
Discharge: HOME | End: 2024-12-31
Attending: ANESTHESIOLOGY | Admitting: ANESTHESIOLOGY
Payer: COMMERCIAL

## 2024-12-31 VITALS
OXYGEN SATURATION: 94 % | DIASTOLIC BLOOD PRESSURE: 83 MMHG | WEIGHT: 235 LBS | HEART RATE: 73 BPM | HEIGHT: 64 IN | TEMPERATURE: 98 F | RESPIRATION RATE: 18 BRPM | SYSTOLIC BLOOD PRESSURE: 135 MMHG | BODY MASS INDEX: 40.12 KG/M2

## 2024-12-31 DIAGNOSIS — M79.18 MYOFASCIAL PAIN: ICD-10-CM

## 2024-12-31 DIAGNOSIS — M53.3 SACROILIAC DYSFUNCTION: Primary | ICD-10-CM

## 2024-12-31 DIAGNOSIS — G89.29 CHRONIC PAIN: ICD-10-CM

## 2024-12-31 PROCEDURE — 27096 INJECT SACROILIAC JOINT: CPT | Mod: RT | Performed by: ANESTHESIOLOGY

## 2024-12-31 PROCEDURE — 63600175 PHARM REV CODE 636 W HCPCS: Performed by: ANESTHESIOLOGY

## 2024-12-31 PROCEDURE — 99152 MOD SED SAME PHYS/QHP 5/>YRS: CPT | Performed by: ANESTHESIOLOGY

## 2024-12-31 PROCEDURE — 99152 MOD SED SAME PHYS/QHP 5/>YRS: CPT | Mod: ,,, | Performed by: ANESTHESIOLOGY

## 2024-12-31 PROCEDURE — 20550 NJX 1 TENDON SHEATH/LIGAMENT: CPT | Mod: 59 | Performed by: ANESTHESIOLOGY

## 2024-12-31 PROCEDURE — 25500020 PHARM REV CODE 255: Performed by: ANESTHESIOLOGY

## 2024-12-31 PROCEDURE — 20550 NJX 1 TENDON SHEATH/LIGAMENT: CPT | Mod: 59,,, | Performed by: ANESTHESIOLOGY

## 2024-12-31 PROCEDURE — 27096 INJECT SACROILIAC JOINT: CPT | Mod: RT,,, | Performed by: ANESTHESIOLOGY

## 2024-12-31 RX ORDER — TRIAMCINOLONE ACETONIDE 40 MG/ML
INJECTION, SUSPENSION INTRA-ARTICULAR; INTRAMUSCULAR
Status: DISCONTINUED | OUTPATIENT
Start: 2024-12-31 | End: 2024-12-31 | Stop reason: HOSPADM

## 2024-12-31 RX ORDER — LIDOCAINE HYDROCHLORIDE 20 MG/ML
INJECTION, SOLUTION INFILTRATION; PERINEURAL
Status: DISCONTINUED | OUTPATIENT
Start: 2024-12-31 | End: 2024-12-31 | Stop reason: HOSPADM

## 2024-12-31 RX ORDER — FENTANYL CITRATE 50 UG/ML
INJECTION, SOLUTION INTRAMUSCULAR; INTRAVENOUS
Status: DISCONTINUED | OUTPATIENT
Start: 2024-12-31 | End: 2024-12-31 | Stop reason: HOSPADM

## 2024-12-31 RX ORDER — BUPIVACAINE HYDROCHLORIDE 2.5 MG/ML
INJECTION, SOLUTION EPIDURAL; INFILTRATION; INTRACAUDAL
Status: DISCONTINUED | OUTPATIENT
Start: 2024-12-31 | End: 2024-12-31 | Stop reason: HOSPADM

## 2024-12-31 RX ORDER — SODIUM CHLORIDE 9 MG/ML
INJECTION, SOLUTION INTRAVENOUS CONTINUOUS
Status: DISCONTINUED | OUTPATIENT
Start: 2024-12-31 | End: 2024-12-31 | Stop reason: HOSPADM

## 2024-12-31 RX ORDER — MIDAZOLAM HYDROCHLORIDE 1 MG/ML
INJECTION INTRAMUSCULAR; INTRAVENOUS
Status: DISCONTINUED | OUTPATIENT
Start: 2024-12-31 | End: 2024-12-31 | Stop reason: HOSPADM

## 2024-12-31 NOTE — DISCHARGE INSTRUCTIONS

## 2024-12-31 NOTE — DISCHARGE SUMMARY
Discharge Note  Short Stay      SUMMARY     Admit Date: 12/31/2024    Attending Physician: Amanda Lewis      Discharge Physician: Amanda Lewis      Discharge Date: 12/31/2024 8:32 AM    Procedure(s) (LRB):  INJECTION,SACROILIAC JOINT RIGHT AND RIGHT PIRIFORMIS (Right)    Final Diagnosis: Myofascial pain [M79.18]  Sacroiliitis [M46.1]    Disposition: Home or self care    Patient Instructions:   Current Discharge Medication List        CONTINUE these medications which have NOT CHANGED    Details   celecoxib (CELEBREX) 100 MG capsule Take 1 capsule (100 mg total) by mouth daily as needed for Pain.  Qty: 30 capsule, Refills: 1    Associated Diagnoses: Degenerative lumbar spinal stenosis      famotidine (PEPCID) 40 MG tablet Take 40 mg by mouth 2 (two) times daily.      losartan-hydrochlorothiazide 50-12.5 mg (HYZAAR) 50-12.5 mg per tablet 1 tablet Orally Once a day for 90 days      !! metFORMIN (GLUCOPHAGE-XR) 500 MG ER 24hr tablet TOME MARISA TABLETA TODOS LOS D AS WITH EVENING MEAL FOR 90 DAYS      !! metFORMIN (GLUCOPHAGE-XR) 500 MG ER 24hr tablet 1 tablet with evening meal Orally Once a day for 90 days      methocarbamoL (ROBAXIN) 500 MG Tab Take 1 tablet (500 mg total) by mouth 3 (three) times daily as needed (muscle spasm).  Qty: 90 tablet, Refills: 0    Associated Diagnoses: Muscle spasm      pantoprazole (PROTONIX) 40 MG tablet Take 40 mg by mouth every morning.      pravastatin (PRAVACHOL) 40 MG tablet Take 40 mg by mouth.      triamcinolone acetonide 0.1% (KENALOG) 0.1 % cream Apply topically 2 (two) times daily.       !! - Potential duplicate medications found. Please discuss with provider.              Discharge Diagnosis: Myofascial pain [M79.18]  Sacroiliitis [M46.1]  Condition on Discharge: Stable with no complications to procedure   Diet on Discharge: Same as before.  Activity: as per instruction sheet.  Discharge to: Home with a responsible adult.  Follow up: 2-4 weeks       Please call my office  or pager at [phone] if experienced any weakness or loss of sensation, fever > 101.5, pain uncontrolled with oral medications, persistent nausea/vomiting/or diarrhea, redness or drainage from the incisions, or any other worrisome concerns. If physician on call was not reached or could not communicate with our office for any reason please go to the nearest emergency department      Amanda Lewis  12/31/2024

## 2024-12-31 NOTE — OP NOTE
Sacroiliac Joint and Piriformis Muscle Injection under Fluoroscopic Guidance    The procedure, risks, benefits, and options were discussed with the patient. There are no contraindications to the procedure. The patent expressed understanding and agreed to the procedure. Informed written consent was obtained prior to the start of the procedure and can be found in the patient's chart.    PATIENT NAME: Zeeshan Patel   MRN: [id number]     DATE OF PROCEDURE: 12/31/2024    PROCEDURE: Right Sacroiliac Joint Injection under Fluoroscopic Guidance  PROCEDURE: Right Piriformis Muscle Injection under Fluoroscopic Guidance    PRE-OP DIAGNOSIS: Myofascial pain [M79.18]  Sacroiliitis [M46.1]    POST-OP DIAGNOSIS: Same    PHYSICIAN: Amanda Lewis MD    ASSISTANTS: Dr. Sullivan     MEDICATIONS INJECTED: Preservative-free Kenalog 40mg with 7cc of Bupivacine 0.25%     LOCAL ANESTHETIC INJECTED: Xylocaine 2%     SEDATION: Versed 1.5 and Fentanyl 50mcg                                                                                                                                                                                     Conscious sedation ordered by M.D. Patient re-evaluation prior to administration of conscious sedation. No changes noted in patient's status from initial evaluation. The patient's vital signs were monitored by RN and patient remained hemodynamically stable throughout the procedure.    Event Time In   Sedation Start 0821   Sedation End 0832       ESTIMATED BLOOD LOSS: None    COMPLICATIONS: None    TECHNIQUE: Time-out was performed to identify the patient and procedure to be performed. With the patient laying in a prone position, the surgical area was prepped and draped in the usual sterile fashion using ChloraPrep and a fenestrated drape. The sacroiliac joint was determined under fluoroscopy guidance. Skin anesthesia was achieved by injecting Lidocaine 2% over the injection site. The sacroiliac joint was  then  approached with a 22 gauge, 3.5 inch spinal quinke needle that was introduced under fluoroscopic guidance in the AP and Lateral views. Once the needle tip was in the area of the joint, and there was no blood, contrast dye Omnipaque (300mg/mL) was injected to confirm placement and there was no vascular runoff. Fluoroscopic imaging in the AP and lateral views revealed a clear outline of the joint space. 4 mL of the medication mixture listed above was injected slowly intraarticular and housotn-articular. Displacement of the radio opaque contrast after injection of the medication confirmed that the medication went into the area of the joint. The needles were removed and bleeding was nil.  A sterile dressing was applied. No specimens collected. The patient tolerated the procedure well.     TECHNIQUE: Time-out was performed to identify the patient and procedure to be performed. With the patient laying in a prone position, the surgical area was prepped and draped in the usual sterile fashion using ChloraPrep and a fenestrated drape.The area overlying the right piriformis muscle was identified under fluoroscopy in the AP view. Skin anesthesia was achieved by injecting Lidocaine 2% over the injection sites. A 22 gauge,  3.5 inch Tuohy needle was then advanced 3 cm inferior and 3 cm lateral to the inferior aspect of the SI joint. Once the piriformis muscle was thought to be encountered, Contrast dye  Omnipaque (300mg/mL) was injected to confirm placement and there was no vascular runoff. Confirmation of spread was identified within the piriformis muscle using AP fluoroscopic views. Then  4 mL of the medication mixture listed above was injected slowly. The needles were removed and bleeding was nil. A sterile dressing was applied. No specimens collected. The patient tolerated the procedure well.       The patient was monitored after the procedure in the recovery area. They were given post-procedure and discharge instructions to  follow at home. The patient was discharged in a stable condition.      Amanda Lewis MD

## 2025-01-06 ENCOUNTER — OFFICE VISIT (OUTPATIENT)
Dept: URGENT CARE | Facility: CLINIC | Age: 55
End: 2025-01-06
Payer: COMMERCIAL

## 2025-01-06 VITALS
HEIGHT: 64 IN | HEART RATE: 88 BPM | OXYGEN SATURATION: 98 % | RESPIRATION RATE: 18 BRPM | BODY MASS INDEX: 40.12 KG/M2 | DIASTOLIC BLOOD PRESSURE: 120 MMHG | WEIGHT: 235 LBS | SYSTOLIC BLOOD PRESSURE: 160 MMHG

## 2025-01-06 DIAGNOSIS — S39.012D ACUTE MYOFASCIAL STRAIN OF LUMBAR REGION, SUBSEQUENT ENCOUNTER: Primary | ICD-10-CM

## 2025-01-06 DIAGNOSIS — S30.0XXD LUMBAR CONTUSION, SUBSEQUENT ENCOUNTER: ICD-10-CM

## 2025-01-06 DIAGNOSIS — Z02.6 ENCOUNTER RELATED TO WORKER'S COMPENSATION CLAIM: ICD-10-CM

## 2025-01-06 PROCEDURE — 99213 OFFICE O/P EST LOW 20 MIN: CPT | Mod: S$GLB,,, | Performed by: PHYSICIAN ASSISTANT

## 2025-01-06 RX ORDER — METHOCARBAMOL 500 MG/1
500 TABLET, FILM COATED ORAL 3 TIMES DAILY PRN
Qty: 30 TABLET | Refills: 0 | Status: SHIPPED | OUTPATIENT
Start: 2025-01-06 | End: 2025-01-16

## 2025-01-06 NOTE — LETTER
[facility]  [address] [address]  Phone: [phone]  Fax: [phone]  [facility] [facility]: [email or web address]    Pt Name: Zeeshan Patel  Injury Date: 07/31/2024   Employee ID: [id number] Date of Treatment: 01/06/2025   Company: [id number]      Appointment Time: 08:30 AM Arrived: 8:28 AM    Provider: Rajwinder Higginbotham PA-C Time Out:9:22 AM      Office Treatment:   1. Acute myofascial strain of lumbar region, subsequent encounter    2. Lumbar contusion, subsequent encounter    3. Encounter related to worker's compensation claim      Medications Ordered This Encounter   Medications    methocarbamoL (ROBAXIN) 500 MG Tab        Patient Instructions: Attention not to aggravate affected area, Daily home exercises/warm soaks, Continue Physical Therapy (F/U with pain management on 1/15 as scheduled.)      Restrictions: No lifting/pushing/pulling more than 10 lbs, Avoid frequent bending/lifting/twisting, Avoid climbing/kneeling/squatting     Return Appointment: 1/27/2025 at 8:30 AM [facility] [place]

## 2025-01-06 NOTE — PROGRESS NOTES
Subjective:      Patient ID: Zeeshan Patel is a 54 y.o. male.    Chief Complaint: Back Pain    Mr. Griffin Patel presents for follow up of low back injury, DOI 7/31/24.  He works in maintenance at [employer].  His NCM, Cheryl, accompanies him to the visit today.   He reports rigth sided LBP that has improved since the PM injection.  He had a right piriformis and right SIJ injection on 12/31/24.  He does still have intermittent radiation of pain into the right hamstring area.  He denies any paresthesias, weakness, saddle anesthesia or B/B dysfunction.  He is scheduled to follow up with PM 1/15/25.  He has restarted PT.  His job continues to not accommodate light duty restrictions.    See MA note below.  Patient's place of employment - [employer]  Patient's job title - General Maintenance  Date of Injury - 07-31-24  Body part injured - Back  Current work status per last visit -  No lifting/pushing/pulling more than 10 lbs, Avoid frequent bending/lifting/twisting, Avoid climbing/kneeling/squatting  Improved, same, or worse - Slightly Improved  Pain Scale right now (1-10) -  5/10    Back Pain  Pertinent negatives include no fever or numbness.       Constitution: Negative for activity change, appetite change, chills and fever.   HENT:  Negative for ear pain, ear discharge and congestion.    Eyes:  Negative for eye discharge and eye redness.   Respiratory:  Negative for cough.    Gastrointestinal:  Negative for vomiting and diarrhea.   Genitourinary:  Negative for hematuria.   Musculoskeletal:  Positive for pain, joint pain, abnormal ROM of joint, back pain, pain with walking and muscle ache. Negative for trauma, joint swelling and muscle cramps.   Skin:  Negative for rash, erythema, bruising and hives.   Allergic/Immunologic: Negative for hives and sneezing.   Neurological:  Negative for passing out, numbness, tingling and seizures.     Objective:     Physical Exam  Constitutional:        Appearance: Normal appearance.   HENT:      Head: Normocephalic and atraumatic.      Right Ear: External ear normal.      Left Ear: External ear normal.      Nose: Nose normal. No congestion or rhinorrhea.   Eyes:      Extraocular Movements: Extraocular movements intact.      Conjunctiva/sclera: Conjunctivae normal.   Pulmonary:      Effort: Pulmonary effort is normal. No respiratory distress.   Musculoskeletal:      Cervical back: Normal.      Thoracic back: Normal.      Lumbar back: Spasms and tenderness present. Decreased range of motion. Negative right straight leg raise test and negative left straight leg raise test.        Back:       Right hip: Tenderness and bony tenderness present. No deformity, lacerations or crepitus. Normal range of motion. Normal strength.      Comments: No step-offs appreciated.  TTP R lower lumbar spine & paraspinal musculature.  TTP R SIJ.  BLE 5/5 HF, KF, KE, DF, PF, EHL.    BLE DTR 2+ & symmetric.  Near normal ROM with flexion, side bending.  Still dec with extension.  R side bending & extension are most painful.  SLR neg bilaterally.       Skin:     General: Skin is warm.      Findings: No erythema.   Neurological:      General: No focal deficit present.      Mental Status: He is alert and oriented to person, place, and time. Mental status is at baseline.   Psychiatric:         Mood and Affect: Mood normal.         Behavior: Behavior normal.         Thought Content: Thought content normal.         Judgment: Judgment normal.        Assessment:      1. Acute myofascial strain of lumbar region, subsequent encounter    2. Lumbar contusion, subsequent encounter    3. Encounter related to worker's compensation claim      Plan:     Follow up with PM as scheduled on 1/15/25.  He will continue PT at this time.  Robaxin refilled.  Continue work restrictions at this time.  He would likely benefit from a work conditioning program.  I will have him follow up with me after he sees pain  management and we can discuss work conditioning in more detail.  He will follow up in about 3 weeks, or sooner, if needed.    Medications Ordered This Encounter   Medications    methocarbamoL (ROBAXIN) 500 MG Tab     Sig: Take 1 tablet (500 mg total) by mouth 3 (three) times daily as needed (spasm).     Dispense:  30 tablet     Refill:  0     Patient Instructions: Attention not to aggravate affected area, Daily home exercises/warm soaks, Continue Physical Therapy (F/U with pain management on 1/15 as scheduled.)   Restrictions: No lifting/pushing/pulling more than 10 lbs, Avoid frequent bending/lifting/twisting, Avoid climbing/kneeling/squatting  Follow up in about 3 weeks (around 1/27/2025).

## 2025-01-15 ENCOUNTER — OFFICE VISIT (OUTPATIENT)
Dept: PAIN MEDICINE | Facility: CLINIC | Age: 55
End: 2025-01-15
Payer: COMMERCIAL

## 2025-01-15 ENCOUNTER — TELEPHONE (OUTPATIENT)
Dept: PAIN MEDICINE | Facility: CLINIC | Age: 55
End: 2025-01-15
Payer: MEDICAID

## 2025-01-15 VITALS
OXYGEN SATURATION: 100 % | WEIGHT: 229.25 LBS | SYSTOLIC BLOOD PRESSURE: 145 MMHG | HEART RATE: 110 BPM | DIASTOLIC BLOOD PRESSURE: 91 MMHG | RESPIRATION RATE: 12 BRPM | BODY MASS INDEX: 39.14 KG/M2 | HEIGHT: 64 IN

## 2025-01-15 DIAGNOSIS — M53.3 SACROILIAC DYSFUNCTION: ICD-10-CM

## 2025-01-15 DIAGNOSIS — M48.061 DEGENERATIVE LUMBAR SPINAL STENOSIS: ICD-10-CM

## 2025-01-15 DIAGNOSIS — M46.1 SACROILIITIS: ICD-10-CM

## 2025-01-15 DIAGNOSIS — M54.16 LUMBAR RADICULOPATHY: ICD-10-CM

## 2025-01-15 DIAGNOSIS — G57.01 PIRIFORMIS SYNDROME OF RIGHT SIDE: Primary | ICD-10-CM

## 2025-01-15 DIAGNOSIS — G89.29 OTHER CHRONIC PAIN: ICD-10-CM

## 2025-01-15 DIAGNOSIS — M79.18 MYOFASCIAL PAIN: ICD-10-CM

## 2025-01-15 DIAGNOSIS — M70.61 GREATER TROCHANTERIC BURSITIS OF RIGHT HIP: ICD-10-CM

## 2025-01-15 PROCEDURE — 99214 OFFICE O/P EST MOD 30 MIN: CPT | Mod: S$GLB,,,

## 2025-01-15 PROCEDURE — 99999 PR PBB SHADOW E&M-EST. PATIENT-LVL IV: CPT | Mod: PBBFAC,,,

## 2025-01-15 RX ORDER — METHOCARBAMOL 500 MG/1
500 TABLET, FILM COATED ORAL 3 TIMES DAILY PRN
Qty: 30 TABLET | Refills: 2 | Status: SHIPPED | OUTPATIENT
Start: 2025-01-15 | End: 2025-01-15

## 2025-01-15 RX ORDER — METHOCARBAMOL 500 MG/1
500 TABLET, FILM COATED ORAL 3 TIMES DAILY PRN
Qty: 30 TABLET | Refills: 2 | Status: SHIPPED | OUTPATIENT
Start: 2025-01-15

## 2025-01-15 NOTE — PATIENT INSTRUCTIONS
"To get and use a foam roller for your IT band:     Type "foam roller for it band" into [employer]. A video named "How to Use a Foam Roller to Release Your IT Band" (featuring a woman in a red shirt) will come up on how to use a foam roller to stretch your IT band. This will help with your bursitis. Also, links will appear where you can order a foam roller to use. Recommend purchasing one around $20 that is smooth (no lumps or grooves). I recommend the [employer] Foam Roller, 18", which is available from multiple retailers for around $20.            "

## 2025-01-15 NOTE — TELEPHONE ENCOUNTER
----- Message from Swati sent at 1/15/2025  8:21 AM CST -----  Type: Patient call    Who called: Patient    Does the patient know what this is regarding? Requesting a call back in regards to having an appt today for 2:40 ; requesting an earlier appt if possible ; please advise    Would the patient rather a call back or response via [facility]? Call    Best call back number: [phone]      Additional information:

## 2025-01-15 NOTE — PROGRESS NOTES
Interventional Pain Management - Established Visit  Follow-Up     PCP: Andrzej Andujar MD    REFERRING PHYSICIAN: Marlon Mendes    CHIEF COMPLAINT: lower back pain after work injury    Original HISTORY OF PRESENT ILLNESS: Zeeshan Patel presents to the clinic for the evaluation of the above pain. The pain started July 31st after a fall at work while carrying a washing machine with co-workers. The machine fell backwards onto him.     Original Pain Description:  The pain is located in the lower back and is radiating to the right upper thigh . The pain is described as aching, sharp, shooting, throbbing, and tingling. Exacerbating factors: Sitting, Standing, Bending, Walking, Extension, Flexing, and Lifting. Mitigating factors heat, laying down, massage, medications, physical therapy, and rest. Symptoms interfere with daily activity, sleeping, and work. He states that he can not sleep on his right side due to pain but can on his left. He has been getting nearly 6 hours of sleep a night since the accident and needs to put a pillow in between his legs for comfort. The patient feels like symptoms have been worsening. Patient denies night fever/night sweats, urinary incontinence, bowel incontinence, significant weight loss, significant motor weakness, and loss of sensations.    Original PAIN SCORES:  Best: Pain is 5  Worst: Pain is 9  Current: Pain is 7        1/15/2025     2:22 PM   Last 3 PDI Scores   Pain Disability Index (PDI) 39       INTERVAL HISTORY: (Newest visit at the bottom)   Interval History (12/9/2024):    [id number] utilized during today's visit    Zeeshan Patel returns to clinic for follow-up after L5/S1 ILESI to the right on 11/19/2024. He reports 50% relief of back pain. He continues with right lower back pain and right lateral hip pain. The pain in the lower back is worse with walking, sitting, standing. He has taken Celebrex and Flexeril, however, these caused stomach  upset. He has a history of gastric ulcer. He discontinued these. He is still seeing  and will be starting PT again as this provided good relief. He denies recent health changes. He denies recent falls or trauma. He denies new onset fever/night sweats, urinary incontinence, bowel incontinence, significant weight changes, significant motor weakness or changes, or loss of sensations. His pain today is 4/10.      Interval History 1/15/2025:   code: [id number]    Zeeshan Patel returns to follow-up after right SI joint and right piriformis injection on 12/31/2024. He reports 60% relief without activity. He continues with lower back pain without radiation. The pain is worse with walking, standing and sitting. He continues physical therapy three times per week. He continues with home exercises. He continues ibuprofen and methocarbamol with good relief. He is still working with . Nila, his  representative, is present via phone call during his visit. He denies recent health changes. He denies recent falls or trauma. He denies new onset fever/night sweats, urinary incontinence, bowel incontinence, significant weight changes, significant motor weakness or changes, or loss of sensations. His pain today is 2/10. With activity 5-6/10.    6 weeks of Conservative therapy:  PT: 8/5/24 to current date (three times a week)      Treatments / Medications: (Ice/Heat/NSAIDS/APAP/etc):  Ibuprofen, methocarbamol       Interventional Pain Procedures: (Previous injections)  11/19/2024 - L5/S1 ILESI (to the right) - 50% relief of back pain    Past Medical History:   Diagnosis Date    GERD (gastroesophageal reflux disease)     Ulcer      Past Surgical History:   Procedure Laterality Date    APPENDECTOMY      EPIDURAL STEROID INJECTION N/A 11/19/2024    Procedure: LUMBAR L5/S1 FABY (TO THE RIGHT);  Surgeon: Amanda Lewis MD;  Location: [facility] PAIN MGT;  Service: Pain Management;  Laterality: N/A;  [phone]  2 WK F/U  KELSI    INJECTION, SACROILIAC JOINT Right 12/31/2024    Procedure: INJECTION,SACROILIAC JOINT RIGHT AND RIGHT PIRIFORMIS;  Surgeon: Amanda Lewis MD;  Location: [facility] PAIN T;  Service: Pain Management;  Laterality: Right;  3 WK F/U EVIE    left shoulder      WRIST ARTHROPLASTY       Social History     Socioeconomic History    Marital status:    Tobacco Use    Smoking status: Every Day    Smokeless tobacco: Never   Substance and Sexual Activity    Alcohol use: No    Drug use: No     Family History   Problem Relation Name Age of Onset    Cancer Mother      Kidney disease Mother      Hypertension Mother      Hypertension Father      Hypertension Maternal Aunt thyroid cancer     Diabetes Maternal Grandfather colon        Review of patient's allergies indicates:  No Known Allergies    Current Outpatient Medications   Medication Sig    celecoxib (CELEBREX) 100 MG capsule Take 1 capsule (100 mg total) by mouth daily as needed for Pain.    famotidine (PEPCID) 40 MG tablet Take 40 mg by mouth 2 (two) times daily.    losartan-hydrochlorothiazide 50-12.5 mg (HYZAAR) 50-12.5 mg per tablet 1 tablet Orally Once a day for 90 days    metFORMIN (GLUCOPHAGE-XR) 500 MG ER 24hr tablet TOME MARISA TABLETA TODOS LOS D AS WITH EVENING MEAL FOR 90 DAYS    metFORMIN (GLUCOPHAGE-XR) 500 MG ER 24hr tablet 1 tablet with evening meal Orally Once a day for 90 days    methocarbamoL (ROBAXIN) 500 MG Tab Take 1 tablet (500 mg total) by mouth 3 (three) times daily as needed (spasm).    pantoprazole (PROTONIX) 40 MG tablet Take 40 mg by mouth every morning.    pravastatin (PRAVACHOL) 40 MG tablet Take 40 mg by mouth.    triamcinolone acetonide 0.1% (KENALOG) 0.1 % cream Apply topically 2 (two) times daily.     No current facility-administered medications for this visit.       ROS:  GENERAL: No fever. No chills. No fatigue. Denies weight loss. Denies weight gain.  HEENT: Denies headaches. Denies vision change. Denies eye pain. Denies double  "vision. Denies ear pain.   CV: Denies chest pain.   PULM: Denies of shortness of breath.  GI: Denies constipation. No diarrhea. No abdominal pain. Denies nausea. Denies vomiting. No blood in stool.  HEME: Denies bleeding problems.  : Denies urgency. No painful urination. No blood in urine.  MS: Denies joint stiffness. Denies joint swelling. Endorses lower back pain that radiates to the right thigh.  SKIN: Denies rash.   NEURO: Denies seizures. No weakness.  PSYCH:  Denies difficulty sleeping. No anxiety. Denies depression. No suicidal thoughts.       VITALS:   Vitals:    01/15/25 1421 01/15/25 1422   BP: (!) 145/91    Pulse: 110    Resp: 12    SpO2: 100%    Weight: 104 kg (229 lb 4.5 oz)    Height: 5' 4" (1.626 m)    PainSc:   2   2   PainLoc: Hip          PHYSICAL EXAM:   GENERAL: Well appearing, in no acute distress, alert and oriented x3.  PSYCH:  Mood and affect appropriate.  SKIN: Skin color, texture, turgor normal, no rashes or lesions.  HEENT:  Normocephalic, atraumatic. Cranial nerves grossly intact.  NECK: No pain to palpation over the cervical paraspinous muscles. No pain to palpation over facets. No pain with neck flexion, extension, or lateral flexion.   PULM: No evidence of respiratory difficulty, symmetric chest rise.  GI:  Non-distended  BACK: Full ROM with pain on extension and axial loading to the right > flexion. SLR negative in seated position. No pain to palpation over lumbar spine or facet joints bilaterally. Mild pain to palpation over right SI joint. Negative FADIR   EXTREMITIES: No deformities, edema, or skin discoloration.   MUSCULOSKELETAL: Dion's and FADIR positive to lateral hip and groin pain to the right. Mild pain to palpation over right piriformis. Pain to palpation over right GTB.  No atrophy is noted.  NEURO: Sensation is equal and appropriate bilaterally. Bilateral upper and lower extremity strength is normal and symmetric. Plantar response are downgoing.    GAIT: " normal.      LABS:  BUN/Cr: 13/0.9    IMAGIN/31/24 --- MRI LUMBAR SPINE     Clinical data: Back pain.     Procedure:  Sagittal and axial, multi-sequence MR images through the lumbar spine were obtained.     Findings:   The alignment, vertebral body heights and marrow signal intensity are normal.   The conus medullaris terminates at the normal level and is normal in signal intensity.        T12/L1/L2: Minimal central disc bulge with no stenosis.   L2/L3: Small right paracentral disc protrusion with no spinal stenosis or neural foramina stenosis.   L3/L4: Diffuse disc bulge with mild to moderate bilateral neural foramina stenosis.   L4/L5: Diffuse disc bulge with mild to moderate bilateral neural foramina stenosis.   L5/S1: No disc herniation or stenosis.     CT LUMBAR SPINE   Findings:   There is a congenitally narrow canal, on the basis of short pedicles. There are superimposed extradural degenerative changes from L2-3 through L4-5. There is multilevel degenerative disc disease.   The alignment and vertebral body heights are normal.  There is no evidence of fracture or subluxation.  Sagittal coronal reformatted images demonstrate no subluxation, or further findings.     24 --- XR HIP WITH PELVIS WHEN PERFORMED 2 OR 3 VIEWS RIGHT     CLINICAL HISTORY:  R hip pain s/p fall; Pain in right hip     TECHNIQUE:  AP view of the pelvis and frog leg lateral view of the right hip were performed.     COMPARISON:  None     FINDINGS:  No fracture or dislocation.  No regional soft tissue abnormalities.     Incidentally noted is lower lumbar degenerative change.     Impression:     Unremarkable appearance of the right hip.        ASSESSMENT: 54 y.o. year old male with pain, consistent with:    Encounter Diagnoses   Name Primary?    Piriformis syndrome of right side Yes    Greater trochanteric bursitis of right hip     Sacroiliac dysfunction     Myofascial pain     Sacroiliitis     Lumbar radiculopathy     Degenerative  lumbar spinal stenosis     Other chronic pain            DISCUSSION: Zeeshan Patel is a Mosotho-speaking man who comes to us after a fall. He was carrying a washing machine up some stairs and fell backwards with the machine landing on top of him. Despite ongoing PT he continues to have back pain radiating to the right leg which is worst with extension and flexion as well as axial loading. Imaging shows L3/4 and 4/L5 bulge with mild to moderate bilateral neural foramina stenosis. Exam shows limited range of motion due to pain and pain to palpation over the spinous processes of the L 3-5 region.     PLAN:  Reviewed prior imaging  Continue PT  Provided patient with home exercises for piriformis and GTB via AVS. Provided information for foam rolling as well. Encouraged daily participation.  He can continue ibuprofen as needed, cautious given history of gastric ulcer.   Continue Methocarbamol 500 mg TID PRN muscle spasms. D/c if stomach upset occurs. Refilled today.  He is s/p right SI joint and right piriformis injection with 60% relief  He has prior MRI, Xrays and CT on disc from the initial accident. He will bring these discs to radiology to be downloaded.   Discussed with patient and  repNila, that I don't fill out FMLA paperwork or speak to functional ability, rec functional capacity exam if needed.   RTC after imaging download to review.     The above plan and management options were discussed at length with patient. Patient is in agreement with the above and verbalized understanding.     Tsering Franks NP   01/15/2025      I spent a total of 30 minutes on the day of the visit.  This includes face to face time and non-face to face time preparing to see the patient (eg, review of tests), obtaining and/or reviewing separately obtained history, documenting clinical information in the electronic or other health record, independently interpreting results and communicating results to the  patient/family/caregiver, or care coordinator.

## 2025-01-27 ENCOUNTER — OFFICE VISIT (OUTPATIENT)
Dept: URGENT CARE | Facility: CLINIC | Age: 55
End: 2025-01-27
Payer: COMMERCIAL

## 2025-01-27 ENCOUNTER — TELEPHONE (OUTPATIENT)
Dept: PAIN MEDICINE | Facility: CLINIC | Age: 55
End: 2025-01-27
Payer: MEDICAID

## 2025-01-27 VITALS
SYSTOLIC BLOOD PRESSURE: 133 MMHG | WEIGHT: 229 LBS | OXYGEN SATURATION: 97 % | HEIGHT: 64 IN | TEMPERATURE: 98 F | HEART RATE: 84 BPM | RESPIRATION RATE: 18 BRPM | DIASTOLIC BLOOD PRESSURE: 91 MMHG | BODY MASS INDEX: 39.09 KG/M2

## 2025-01-27 DIAGNOSIS — S30.0XXD LUMBAR CONTUSION, SUBSEQUENT ENCOUNTER: ICD-10-CM

## 2025-01-27 DIAGNOSIS — Z02.6 ENCOUNTER RELATED TO WORKER'S COMPENSATION CLAIM: ICD-10-CM

## 2025-01-27 DIAGNOSIS — M25.551 RIGHT HIP PAIN: ICD-10-CM

## 2025-01-27 DIAGNOSIS — S39.012D ACUTE MYOFASCIAL STRAIN OF LUMBAR REGION, SUBSEQUENT ENCOUNTER: Primary | ICD-10-CM

## 2025-01-27 PROCEDURE — 99214 OFFICE O/P EST MOD 30 MIN: CPT | Mod: S$GLB,,, | Performed by: SURGERY

## 2025-01-27 NOTE — TELEPHONE ENCOUNTER
----- Message from Mary Ann sent at 1/27/2025 11:33 AM CST -----  Contact: Patient  Type:  Patient Call          Who Called: Patient         Does the patient know what this is regarding?: Requesting a call back ;pt would like to have his appt rescheduled due to the workers comp nurse needing to be present and that date scheduled she can't make ;pt would like to be scheduled for 1/29 morning ; earlier if a[ppt remains on 1/30  ; 1:30 if he's rescheduled to 1/31 ; please advise           Would the patient rather a call back or a response via [facility]?call           Best Call Back Number: [phone]             Additional Information:

## 2025-01-27 NOTE — PROGRESS NOTES
Subjective:      Patient ID: Zeeshan Patel is a 54 y.o. male.    Chief Complaint: Back Injury    Patient's place of employment - [employer]  Patient's job title - General Maintenance  Date of Injury - 07-31-24  Body part injured - Back  Current work status per last visit -  No lifting/pushing/pulling more than 10 lbs, Avoid frequent bending/lifting/twisting, Avoid climbing/kneeling/squatting  Improved, same, or worse -Same  Pain Scale right now (1-10) -  5/10    Patient states prolonged sitting,standing,walking, or laying down increases the pain.      Back Pain  This is a new problem. The current episode started more than 1 month ago. The problem occurs constantly. The problem is unchanged. The quality of the pain is described as aching and stabbing (Sharp). The pain is at a severity of 5/10. The pain is moderate. The pain is The same all the time. The symptoms are aggravated by bending, sitting and standing. Stiffness is present All day. Pertinent negatives include no leg pain, numbness or tingling. He has tried muscle relaxant for the symptoms. The treatment provided mild relief.       Musculoskeletal:  Positive for back pain.   Skin:  Negative for erythema.   Neurological:  Negative for numbness.     See MA note above. Begin MD note:    Zeeshan Patel is a 54 y.o. presenting for follow-up of back injury, accompanied by NCM.  Recommendations from last visit:  Follow up with pain medicine, continue physical therapy.  Today, he reports continued radiating pain from right low back down right leg, stopping at distal thigh. Pain medicine just recently reviewed prior imaging studies and plan to follow-up this week to determine next steps in treatment plan.  He is starting a new course of physical therapy this week.  He has been compliant with the home exercise program.  He has not been doing much related to weightlifting and progressing weight tolerance in physical therapy.  He notes that  the only time he has lifted anything greater than 15-20 lb was on January 13th.  This is his 3rd course of physical therapy.  He is motivated to continue treatment and returned to regular duty.      Objective:     Physical Exam  Constitutional:       Appearance: Normal appearance.   HENT:      Head: Normocephalic and atraumatic.      Right Ear: External ear normal.      Left Ear: External ear normal.   Eyes:      Extraocular Movements: Extraocular movements intact.      Conjunctiva/sclera: Conjunctivae normal.   Pulmonary:      Effort: Pulmonary effort is normal. No respiratory distress.   Musculoskeletal:      Cervical back: Normal.      Thoracic back: Normal.      Lumbar back: Spasms and tenderness present. Decreased range of motion. Negative right straight leg raise test and negative left straight leg raise test.        Back:       Right hip: Tenderness and bony tenderness present. No deformity, lacerations or crepitus. Normal range of motion. Normal strength.      Comments: No step-offs appreciated.  TTP R lower lumbar spine & paraspinal musculature.  TTP R SIJ.  Decreased range of motion of lumbar spine, with report increased pain with flexion beyond 70° and extension beyond 5° as well as right side bending and trunk rotation to the right.  Normal gait.         Skin:     General: Skin is warm.      Findings: No erythema.   Neurological:      General: No focal deficit present.      Mental Status: He is alert and oriented to person, place, and time. Mental status is at baseline.   Psychiatric:         Mood and Affect: Mood normal.         Behavior: Behavior normal.         Thought Content: Thought content normal.         Judgment: Judgment normal.        Assessment:      1. Acute myofascial strain of lumbar region, subsequent encounter    2. Encounter related to worker's compensation claim    3. Lumbar contusion, subsequent encounter    4. Right hip pain      Plan:     I reviewed the prior [facility] health treatment  notes and specialist notes related to this injury, mild improvements in pain symptoms with ESIs.  ESIs were performed without access to prior imaging, pain medicine is now reviewing to determine next steps.  Unsure why physical therapy has not included lifting challenges in preparation for returned to work, advised that nurse  physical therapy clinic to encourage more aggressive approach to physical therapy which is a desire indicated by the patient.  We discussed next steps in treatment based on pain medicine plan and current course of physical therapy, if improvements noted then we will advance to work conditioning, if no improvements noted then we will consider FCE.  He declined any need for prescription refills.  Follow up with regular treating provider in three weeks/prior to completion of current PT course.         Diagnoses and plan discussed with the patient, as well as the expected course and duration of symptoms. Risks and benefits of any medication prescribed during this visit was explained, verbal instructions on use given. Clinic/Emergency department return precautions were given, can return to [facility] before scheduled follow-up appointment if notes worsening/aggravation of symptoms. All questions and concerns were addressed prior to discharge. Plan was developed with active input from the patient and they verbalized understanding of and agreement with the POC.  [facility] was informed of any referrals and relevant orders.  Note was dictated with voice recognition software, please excuse any grammatical errors.    I spent a total of 30 minutes on the day of the visit.  This includes face to face time and non-face to face time preparing to see the patient (e.g. review of medical record), obtaining and/or reviewing separately obtained history, documenting clinical information in the electronic or other health record, independently interpreting results and communicating results to the  patient/family/caregiver, or care coordinator.    Patient Instructions: Continue Physical Therapy, Daily home exercises/warm soaks (Discuss with PT need for progressing treatment more aggressively. Continuar la fisioterapia; Ejercicios diarios en casa/mallika calientes. Discuta con el fisioterapeuta la necesidad de avanzar en el tratamiento de manera más agresiva.)   Restrictions: No lifting/pushing/pulling more than 10 lbs, Avoid frequent bending/lifting/twisting, Avoid climbing/kneeling/squatting (No levantar/empujar/jalar más de 10 libras; Evite agacharse/levantar/torcer con frecuencia; Evite trepar/arrodillarse/ponerse en cuclillas.)  Follow up in about 22 days (around 2/18/2025) for With Anahy.

## 2025-01-27 NOTE — LETTER
[facility] [employer] [facility]  [address] [place] [address]  Phone: [phone]  Fax: [phone]  [facility]: [email or web address]    Pt Name: Zesehan Patel  Injury Date: 07/31/2024   Employee ID: [id number] Date of Treatment: 01/27/2025   Company: [employer]      Appointment Time: 08:15 AM Arrived: 8:15 am   Provider: Ann Cabral MD Time Out:9:45 am     Office Treatment:   1. Acute myofascial strain of lumbar region, subsequent encounter    2. Encounter related to worker's compensation claim    3. Lumbar contusion, subsequent encounter    4. Right hip pain          Patient Instructions: Continue Physical Therapy, Daily home exercises/warm soaks (Discuss with PT need for progressing treatment more aggressively. Continuar la fisioterapia; Ejercicios diarios en casa/mallika calientes. Discuta con el fisioterapeuta la necesidad de avanzar en el tratamiento de manera más agresiva.)      Restrictions: No lifting/pushing/pulling more than 10 lbs, Avoid frequent bending/lifting/twisting, Avoid climbing/kneeling/squatting (No levantar/empujar/jalar más de 10 libras; Evite agacharse/levantar/torcer con frecuencia; Evite trepar/arrodillarse/ponerse en cuclillas.)     Return Appointment: 2/18/2025 at 8:30 am

## 2025-01-29 ENCOUNTER — TELEPHONE (OUTPATIENT)
Dept: PAIN MEDICINE | Facility: CLINIC | Age: 55
End: 2025-01-29
Payer: MEDICAID

## 2025-01-29 NOTE — TELEPHONE ENCOUNTER
----- Message from Mariela sent at 1/29/2025 12:38 PM CST -----  Regarding: appt  Type:Patient Returning Call:Nurse         Who Called: MA         Who Left Message for Patient:         Does the patient know what this is regarding? Reschedule         Best Call Back Number: [phone]         Additional Information:

## 2025-02-03 ENCOUNTER — OFFICE VISIT (OUTPATIENT)
Dept: PAIN MEDICINE | Facility: CLINIC | Age: 55
End: 2025-02-03
Payer: COMMERCIAL

## 2025-02-03 VITALS
BODY MASS INDEX: 40.42 KG/M2 | WEIGHT: 235.44 LBS | DIASTOLIC BLOOD PRESSURE: 99 MMHG | HEART RATE: 78 BPM | SYSTOLIC BLOOD PRESSURE: 166 MMHG | TEMPERATURE: 98 F

## 2025-02-03 DIAGNOSIS — M54.16 LUMBAR RADICULOPATHY: ICD-10-CM

## 2025-02-03 DIAGNOSIS — M70.61 GREATER TROCHANTERIC BURSITIS OF RIGHT HIP: ICD-10-CM

## 2025-02-03 DIAGNOSIS — M25.551 CHRONIC RIGHT HIP PAIN: Primary | ICD-10-CM

## 2025-02-03 DIAGNOSIS — M48.061 DEGENERATIVE LUMBAR SPINAL STENOSIS: ICD-10-CM

## 2025-02-03 DIAGNOSIS — M79.18 MYOFASCIAL PAIN: ICD-10-CM

## 2025-02-03 DIAGNOSIS — M53.3 SACROILIAC DYSFUNCTION: Primary | ICD-10-CM

## 2025-02-03 DIAGNOSIS — M46.1 SACROILIITIS: ICD-10-CM

## 2025-02-03 DIAGNOSIS — M25.551 CHRONIC RIGHT HIP PAIN: ICD-10-CM

## 2025-02-03 DIAGNOSIS — G89.29 OTHER CHRONIC PAIN: ICD-10-CM

## 2025-02-03 DIAGNOSIS — G89.29 CHRONIC RIGHT HIP PAIN: Primary | ICD-10-CM

## 2025-02-03 DIAGNOSIS — G89.29 CHRONIC RIGHT HIP PAIN: ICD-10-CM

## 2025-02-03 PROCEDURE — 99214 OFFICE O/P EST MOD 30 MIN: CPT | Mod: S$GLB,,,

## 2025-02-03 PROCEDURE — 99999 PR PBB SHADOW E&M-EST. PATIENT-LVL III: CPT | Mod: PBBFAC,,,

## 2025-02-03 NOTE — PROGRESS NOTES
Interventional Pain Management - Established Visit  Follow-Up     PCP: Andrzej Andujar MD    REFERRING PHYSICIAN: No ref. provider found    CHIEF COMPLAINT: lower back pain after work injury    Original HISTORY OF PRESENT ILLNESS: Zeeshan Patel presents to the clinic for the evaluation of the above pain. The pain started July 31st after a fall at work while carrying a washing machine with co-workers. The machine fell backwards onto him.     Original Pain Description:  The pain is located in the lower back and is radiating to the right upper thigh . The pain is described as aching, sharp, shooting, throbbing, and tingling. Exacerbating factors: Sitting, Standing, Bending, Walking, Extension, Flexing, and Lifting. Mitigating factors heat, laying down, massage, medications, physical therapy, and rest. Symptoms interfere with daily activity, sleeping, and work. He states that he can not sleep on his right side due to pain but can on his left. He has been getting nearly 6 hours of sleep a night since the accident and needs to put a pillow in between his legs for comfort. The patient feels like symptoms have been worsening. Patient denies night fever/night sweats, urinary incontinence, bowel incontinence, significant weight loss, significant motor weakness, and loss of sensations.    Original PAIN SCORES:  Best: Pain is 5  Worst: Pain is 9  Current: Pain is 7        2/3/2025     7:54 AM   Last 3 PDI Scores   Pain Disability Index (PDI) 48       INTERVAL HISTORY: (Newest visit at the bottom)   Interval History (12/9/2024):    [id number] utilized during today's visit    Zeeshan Patel returns to clinic for follow-up after L5/S1 ILESI to the right on 11/19/2024. He reports 50% relief of back pain. He continues with right lower back pain and right lateral hip pain. The pain in the lower back is worse with walking, sitting, standing. He has taken Celebrex and Flexeril, however, these caused  stomach upset. He has a history of gastric ulcer. He discontinued these. He is still seeing  and will be starting PT again as this provided good relief. He denies recent health changes. He denies recent falls or trauma. He denies new onset fever/night sweats, urinary incontinence, bowel incontinence, significant weight changes, significant motor weakness or changes, or loss of sensations. His pain today is 4/10.      Interval History 1/15/2025:   code: [id number]    Zeeshan Patel returns to follow-up after right SI joint and right piriformis injection on 12/31/2024. He reports 60% relief without activity. He continues with lower back pain without radiation. The pain is worse with walking, standing and sitting. He continues physical therapy three times per week. He continues with home exercises. He continues ibuprofen and methocarbamol with good relief. He is still working with . Nila, his  representative, is present via phone call during his visit. He denies recent health changes. He denies recent falls or trauma. He denies new onset fever/night sweats, urinary incontinence, bowel incontinence, significant weight changes, significant motor weakness or changes, or loss of sensations. His pain today is 2/10. With activity 5-6/10.    Interval History 2/3/2025:  Zeeshan Patel returns for follow-up for imaging review.     Nila Zhao, nurse  with  is present during the visit. Cannot climb stairs due to hip pain. PT has not been helpful. He states the sessions have not been challenging and he reports massage and massage gun with limited exercise or strengthening during sessions. He denies recent health changes. He denies recent falls or trauma. He denies new onset fever/night sweats, urinary incontinence, bowel incontinence, significant weight changes, significant motor weakness or changes, or loss of sensations. His pain today is 4/10.  With activity, the pain is  6/10.    6 weeks of Conservative therapy:  PT: 8/5/24 to current date (three times a week)  HEP: most days      Treatments / Medications: (Ice/Heat/NSAIDS/APAP/etc):  Ibuprofen, methocarbamol       Interventional Pain Procedures: (Previous injections)  11/19/2024 - L5/S1 ILESI (to the right) - 50% relief of back pain  12/31/2024 - right SI joint and right piriformis - 60% relief     Past Medical History:   Diagnosis Date    GERD (gastroesophageal reflux disease)     Ulcer      Past Surgical History:   Procedure Laterality Date    APPENDECTOMY      EPIDURAL STEROID INJECTION N/A 11/19/2024    Procedure: LUMBAR L5/S1 FABY (TO THE RIGHT);  Surgeon: Amanda Lewis MD;  Location: [facility];  Service: Pain Management;  Laterality: N/A;  [phone]  2 WK F/U KELSI    INJECTION, SACROILIAC JOINT Right 12/31/2024    Procedure: INJECTION,SACROILIAC JOINT RIGHT AND RIGHT PIRIFORMIS;  Surgeon: Amanda Lewis MD;  Location: [facility];  Service: Pain Management;  Laterality: Right;  3 WK F/U EVIE    left shoulder      WRIST ARTHROPLASTY       Social History     Socioeconomic History    Marital status:    Tobacco Use    Smoking status: Every Day    Smokeless tobacco: Never   Substance and Sexual Activity    Alcohol use: No    Drug use: No     Family History   Problem Relation Name Age of Onset    Cancer Mother      Kidney disease Mother      Hypertension Mother      Hypertension Father      Hypertension Maternal Aunt thyroid cancer     Diabetes Maternal Grandfather colon        Review of patient's allergies indicates:  No Known Allergies    Current Outpatient Medications   Medication Sig    famotidine (PEPCID) 40 MG tablet Take 40 mg by mouth 2 (two) times daily.    losartan-hydrochlorothiazide 50-12.5 mg (HYZAAR) 50-12.5 mg per tablet 1 tablet Orally Once a day for 90 days    methocarbamoL (ROBAXIN) 500 MG Tab Take 1 tablet (500 mg total) by mouth 3 (three) times daily as needed (spasm).    pantoprazole  (PROTONIX) 40 MG tablet Take 40 mg by mouth every morning.    pravastatin (PRAVACHOL) 40 MG tablet Take 40 mg by mouth.    celecoxib (CELEBREX) 100 MG capsule Take 1 capsule (100 mg total) by mouth daily as needed for Pain. (Patient not taking: Reported on 2/3/2025)    metFORMIN (GLUCOPHAGE-XR) 500 MG ER 24hr tablet TOME MARISA TABLETA TODOS LOS D AS WITH EVENING MEAL FOR 90 DAYS (Patient not taking: Reported on 2/3/2025)    metFORMIN (GLUCOPHAGE-XR) 500 MG ER 24hr tablet 1 tablet with evening meal Orally Once a day for 90 days (Patient not taking: Reported on 2/3/2025)    triamcinolone acetonide 0.1% (KENALOG) 0.1 % cream Apply topically 2 (two) times daily. (Patient not taking: Reported on 2/3/2025)     No current facility-administered medications for this visit.       ROS:  GENERAL: No fever. No chills. No fatigue. Denies weight loss. Denies weight gain.  HEENT: Denies headaches. Denies vision change. Denies eye pain. Denies double vision. Denies ear pain.   CV: Denies chest pain.   PULM: Denies of shortness of breath.  GI: Denies constipation. No diarrhea. No abdominal pain. Denies nausea. Denies vomiting. No blood in stool.  HEME: Denies bleeding problems.  : Denies urgency. No painful urination. No blood in urine.  MS: Denies joint stiffness. Denies joint swelling. Endorses lower back pain that radiates to the right thigh.  SKIN: Denies rash.   NEURO: Denies seizures. No weakness.  PSYCH:  Denies difficulty sleeping. No anxiety. Denies depression. No suicidal thoughts.       VITALS:   Vitals:    02/03/25 0754 02/03/25 0755   BP: (!) 166/99    Pulse: 78    Temp: 97.6 °F (36.4 °C)    Weight: 106.8 kg (235 lb 7.2 oz)    PainSc:   4   4   PainLoc: Back          PHYSICAL EXAM:   GENERAL: Well appearing, in no acute distress, alert and oriented x3.  PSYCH:  Mood and affect appropriate.  SKIN: Skin color, texture, turgor normal, no rashes or lesions.  HEENT:  Normocephalic, atraumatic. Cranial nerves grossly  intact.  NECK: No pain to palpation over the cervical paraspinous muscles. No pain to palpation over facets. No pain with neck flexion, extension, or lateral flexion.   PULM: No evidence of respiratory difficulty, symmetric chest rise.  GI:  Non-distended  BACK: Full ROM with pain on extension and axial loading to the right > flexion. SLR negative in seated position. No pain to palpation over lumbar spine or facet joints bilaterally. Mild pain to palpation over right SI joint. Negative FADIR   EXTREMITIES: No deformities, edema, or skin discoloration.   MUSCULOSKELETAL: Dion's and FADIR positive to lateral hip and groin pain to the right. Pain to right groin and hip with hip flexion. Mild pain to palpation over right piriformis. Pain to palpation over right GTB.  No atrophy is noted.  NEURO: Sensation is equal and appropriate bilaterally. Bilateral upper and lower extremity strength is normal and symmetric. Plantar response are downgoing.    GAIT: normal.      LABS:  BUN/Cr: 13/0.9    IMAGING:    XR HIP WITH PELVIS WHEN PERFORMED 2 OR 3 VIEWS RIGHT     CLINICAL HISTORY:  R hip pain s/p fall; Pain in right hip     TECHNIQUE:  AP view of the pelvis and frog leg lateral view of the right hip were performed.     COMPARISON:  None     FINDINGS:  No fracture or dislocation.  No regional soft tissue abnormalities.     Incidentally noted is lower lumbar degenerative change.     Impression:     Unremarkable appearance of the right hip.        Electronically signed by:Chet Nuñez  Date:                                            08/20/2024  Time:                                           09:33    7/31/24 --- MRI LUMBAR SPINE     Clinical data: Back pain.     Procedure:  Sagittal and axial, multi-sequence MR images through the lumbar spine were obtained.     Findings:   The alignment, vertebral body heights and marrow signal intensity are normal.   The conus medullaris terminates at the normal level and is normal in signal  intensity.        T12/L1/L2: Minimal central disc bulge with no stenosis.   L2/L3: Small right paracentral disc protrusion with no spinal stenosis or neural foramina stenosis.   L3/L4: Diffuse disc bulge with mild to moderate bilateral neural foramina stenosis.   L4/L5: Diffuse disc bulge with mild to moderate bilateral neural foramina stenosis.   L5/S1: No disc herniation or stenosis.     CT LUMBAR SPINE   Findings:   There is a congenitally narrow canal, on the basis of short pedicles. There are superimposed extradural degenerative changes from L2-3 through L4-5. There is multilevel degenerative disc disease.   The alignment and vertebral body heights are normal.  There is no evidence of fracture or subluxation.  Sagittal coronal reformatted images demonstrate no subluxation, or further findings.     8/20/24 --- XR HIP WITH PELVIS WHEN PERFORMED 2 OR 3 VIEWS RIGHT     CLINICAL HISTORY:  R hip pain s/p fall; Pain in right hip     TECHNIQUE:  AP view of the pelvis and frog leg lateral view of the right hip were performed.     COMPARISON:  None     FINDINGS:  No fracture or dislocation.  No regional soft tissue abnormalities.     Incidentally noted is lower lumbar degenerative change.     Impression:     Unremarkable appearance of the right hip.        ASSESSMENT: 55 y.o. year old male with pain, consistent with:    Encounter Diagnoses   Name Primary?    Sacroiliac dysfunction Yes    Degenerative lumbar spinal stenosis     Lumbar radiculopathy     Chronic right hip pain     Greater trochanteric bursitis of right hip     Sacroiliitis     Myofascial pain     Other chronic pain        DISCUSSION: Zeeshan Patel is a [place]-speaking man who comes to us after a fall. He was carrying a washing machine up some stairs and fell backwards with the machine landing on top of him. Despite ongoing PT he continues to have back pain radiating to the right leg which is worst with extension and flexion as well as axial  loading. Imaging shows L3/4 and 4/L5 bulge with mild to moderate bilateral neural foramina stenosis. Exam shows limited range of motion due to pain and pain to palpation over the spinous processes of the L 3-5 region. Exam shows pain to right SI joint and right hip.     PLAN:  Reviewed prior imaging recently downloaded and discussed with patient.   Elevated BP today. He denies chest pain, headache, or any other red flag symptoms. He is scheduled to see PCP today to discuss BP and BP medications.   Procedure for Right Hip joint and Right GTB under fluoroscopy given history and physical exam.   Continue PT. He has not progressed with 3 rounds of traditional physical therapy. He states they massage him and use the massage gun. Referral today to [facility] for improved core strengthening and lumbar stabilization.  Previously provided patient with home exercises for piriformis and GTB via AVS. Provided information for foam rolling as well. Encouraged daily participation.  He can continue ibuprofen as needed, cautious given history of gastric ulcer.   Continue Methocarbamol 500 mg TID PRN muscle spasms. D/c if stomach upset occurs. Refilled today.  Discussed with patient and  rep, Nila, that I [facility] for [facility].    RTC 2-3 weeks after above procedure with Dr Lewis.   Consider updating right hip MRI if limited relief with injection.     The above plan and management options were discussed at length with patient. Patient is in agreement with the above and verbalized understanding.     Tsering Franks NP   02/03/2025    I spent a total of 30 minutes on the day of the visit.  This includes face to face time and non-face to face time preparing to see the patient by reviewing previous labs/imaging, obtaining and/or reviewing separately obtained history, documenting clinical information in the electronic or other health record, independently interpreting results and  communicating results to the patient/family/caregiver.

## 2025-02-03 NOTE — H&P (VIEW-ONLY)
Interventional Pain Management - Established Visit  Follow-Up     PCP: Andrzej Andujar MD    REFERRING PHYSICIAN: No ref. provider found    CHIEF COMPLAINT: lower back pain after work injury    Original HISTORY OF PRESENT ILLNESS: Zeeshan Patel presents to the clinic for the evaluation of the above pain. The pain started July 31st after a fall at work while carrying a washing machine with co-workers. The machine fell backwards onto him.     Original Pain Description:  The pain is located in the lower back and is radiating to the right upper thigh . The pain is described as aching, sharp, shooting, throbbing, and tingling. Exacerbating factors: Sitting, Standing, Bending, Walking, Extension, Flexing, and Lifting. Mitigating factors heat, laying down, massage, medications, physical therapy, and rest. Symptoms interfere with daily activity, sleeping, and work. He states that he can not sleep on his right side due to pain but can on his left. He has been getting nearly 6 hours of sleep a night since the accident and needs to put a pillow in between his legs for comfort. The patient feels like symptoms have been worsening. Patient denies night fever/night sweats, urinary incontinence, bowel incontinence, significant weight loss, significant motor weakness, and loss of sensations.    Original PAIN SCORES:  Best: Pain is 5  Worst: Pain is 9  Current: Pain is 7        2/3/2025     7:54 AM   Last 3 PDI Scores   Pain Disability Index (PDI) 48       INTERVAL HISTORY: (Newest visit at the bottom)   Interval History (12/9/2024):    [id number] utilized during today's visit    Zeeshan Patel returns to clinic for follow-up after L5/S1 ILESI to the right on 11/19/2024. He reports 50% relief of back pain. He continues with right lower back pain and right lateral hip pain. The pain in the lower back is worse with walking, sitting, standing. He has taken Celebrex and Flexeril, however, these caused  stomach upset. He has a history of gastric ulcer. He discontinued these. He is still seeing  and will be starting PT again as this provided good relief. He denies recent health changes. He denies recent falls or trauma. He denies new onset fever/night sweats, urinary incontinence, bowel incontinence, significant weight changes, significant motor weakness or changes, or loss of sensations. His pain today is 4/10.      Interval History 1/15/2025:   code: [id number]    Zeeshan Patel returns to follow-up after right SI joint and right piriformis injection on 12/31/2024. He reports 60% relief without activity. He continues with lower back pain without radiation. The pain is worse with walking, standing and sitting. He continues physical therapy three times per week. He continues with home exercises. He continues ibuprofen and methocarbamol with good relief. He is still working with . Nila, his  representative, is present via phone call during his visit. He denies recent health changes. He denies recent falls or trauma. He denies new onset fever/night sweats, urinary incontinence, bowel incontinence, significant weight changes, significant motor weakness or changes, or loss of sensations. His pain today is 2/10. With activity 5-6/10.    Interval History 2/3/2025:  Zeeshan Patel returns for follow-up for imaging review.     Nila Zhao, nurse  with  is present during the visit. Cannot climb stairs due to hip pain. PT has not been helpful. He states the sessions have not been challenging and he reports massage and massage gun with limited exercise or strengthening during sessions. He denies recent health changes. He denies recent falls or trauma. He denies new onset fever/night sweats, urinary incontinence, bowel incontinence, significant weight changes, significant motor weakness or changes, or loss of sensations. His pain today is 4/10.  With activity, the pain is  6/10.    6 weeks of Conservative therapy:  PT: 8/5/24 to current date (three times a week)  HEP: most days      Treatments / Medications: (Ice/Heat/NSAIDS/APAP/etc):  Ibuprofen, methocarbamol       Interventional Pain Procedures: (Previous injections)  11/19/2024 - L5/S1 ILESI (to the right) - 50% relief of back pain  12/31/2024 - right SI joint and right piriformis - 60% relief     Past Medical History:   Diagnosis Date    GERD (gastroesophageal reflux disease)     Ulcer      Past Surgical History:   Procedure Laterality Date    APPENDECTOMY      EPIDURAL STEROID INJECTION N/A 11/19/2024    Procedure: LUMBAR L5/S1 FABY (TO THE RIGHT);  Surgeon: Amanda Lewis MD;  Location: [facility];  Service: Pain Management;  Laterality: N/A;  [phone]  2 WK F/U KELSI    INJECTION, SACROILIAC JOINT Right 12/31/2024    Procedure: INJECTION,SACROILIAC JOINT RIGHT AND RIGHT PIRIFORMIS;  Surgeon: Amanda Lewis MD;  Location: [facility];  Service: Pain Management;  Laterality: Right;  3 WK F/U EVIE    left shoulder      WRIST ARTHROPLASTY       Social History     Socioeconomic History    Marital status:    Tobacco Use    Smoking status: Every Day    Smokeless tobacco: Never   Substance and Sexual Activity    Alcohol use: No    Drug use: No     Family History   Problem Relation Name Age of Onset    Cancer Mother      Kidney disease Mother      Hypertension Mother      Hypertension Father      Hypertension Maternal Aunt thyroid cancer     Diabetes Maternal Grandfather colon        Review of patient's allergies indicates:  No Known Allergies    Current Outpatient Medications   Medication Sig    famotidine (PEPCID) 40 MG tablet Take 40 mg by mouth 2 (two) times daily.    losartan-hydrochlorothiazide 50-12.5 mg (HYZAAR) 50-12.5 mg per tablet 1 tablet Orally Once a day for 90 days    methocarbamoL (ROBAXIN) 500 MG Tab Take 1 tablet (500 mg total) by mouth 3 (three) times daily as needed (spasm).    pantoprazole  (PROTONIX) 40 MG tablet Take 40 mg by mouth every morning.    pravastatin (PRAVACHOL) 40 MG tablet Take 40 mg by mouth.    celecoxib (CELEBREX) 100 MG capsule Take 1 capsule (100 mg total) by mouth daily as needed for Pain. (Patient not taking: Reported on 2/3/2025)    metFORMIN (GLUCOPHAGE-XR) 500 MG ER 24hr tablet TOME MARISA TABLETA TODOS LOS D AS WITH EVENING MEAL FOR 90 DAYS (Patient not taking: Reported on 2/3/2025)    metFORMIN (GLUCOPHAGE-XR) 500 MG ER 24hr tablet 1 tablet with evening meal Orally Once a day for 90 days (Patient not taking: Reported on 2/3/2025)    triamcinolone acetonide 0.1% (KENALOG) 0.1 % cream Apply topically 2 (two) times daily. (Patient not taking: Reported on 2/3/2025)     No current facility-administered medications for this visit.       ROS:  GENERAL: No fever. No chills. No fatigue. Denies weight loss. Denies weight gain.  HEENT: Denies headaches. Denies vision change. Denies eye pain. Denies double vision. Denies ear pain.   CV: Denies chest pain.   PULM: Denies of shortness of breath.  GI: Denies constipation. No diarrhea. No abdominal pain. Denies nausea. Denies vomiting. No blood in stool.  HEME: Denies bleeding problems.  : Denies urgency. No painful urination. No blood in urine.  MS: Denies joint stiffness. Denies joint swelling. Endorses lower back pain that radiates to the right thigh.  SKIN: Denies rash.   NEURO: Denies seizures. No weakness.  PSYCH:  Denies difficulty sleeping. No anxiety. Denies depression. No suicidal thoughts.       VITALS:   Vitals:    02/03/25 0754 02/03/25 0755   BP: (!) 166/99    Pulse: 78    Temp: 97.6 °F (36.4 °C)    Weight: 106.8 kg (235 lb 7.2 oz)    PainSc:   4   4   PainLoc: Back          PHYSICAL EXAM:   GENERAL: Well appearing, in no acute distress, alert and oriented x3.  PSYCH:  Mood and affect appropriate.  SKIN: Skin color, texture, turgor normal, no rashes or lesions.  HEENT:  Normocephalic, atraumatic. Cranial nerves grossly  intact.  NECK: No pain to palpation over the cervical paraspinous muscles. No pain to palpation over facets. No pain with neck flexion, extension, or lateral flexion.   PULM: No evidence of respiratory difficulty, symmetric chest rise.  GI:  Non-distended  BACK: Full ROM with pain on extension and axial loading to the right > flexion. SLR negative in seated position. No pain to palpation over lumbar spine or facet joints bilaterally. Mild pain to palpation over right SI joint. Negative FADIR   EXTREMITIES: No deformities, edema, or skin discoloration.   MUSCULOSKELETAL: Dion's and FADIR positive to lateral hip and groin pain to the right. Pain to right groin and hip with hip flexion. Mild pain to palpation over right piriformis. Pain to palpation over right GTB.  No atrophy is noted.  NEURO: Sensation is equal and appropriate bilaterally. Bilateral upper and lower extremity strength is normal and symmetric. Plantar response are downgoing.    GAIT: normal.      LABS:  BUN/Cr: 13/0.9    IMAGING:    XR HIP WITH PELVIS WHEN PERFORMED 2 OR 3 VIEWS RIGHT     CLINICAL HISTORY:  R hip pain s/p fall; Pain in right hip     TECHNIQUE:  AP view of the pelvis and frog leg lateral view of the right hip were performed.     COMPARISON:  None     FINDINGS:  No fracture or dislocation.  No regional soft tissue abnormalities.     Incidentally noted is lower lumbar degenerative change.     Impression:     Unremarkable appearance of the right hip.        Electronically signed by:Chet Nuñez  Date:                                            08/20/2024  Time:                                           09:33    7/31/24 --- MRI LUMBAR SPINE     Clinical data: Back pain.     Procedure:  Sagittal and axial, multi-sequence MR images through the lumbar spine were obtained.     Findings:   The alignment, vertebral body heights and marrow signal intensity are normal.   The conus medullaris terminates at the normal level and is normal in signal  intensity.        T12/L1/L2: Minimal central disc bulge with no stenosis.   L2/L3: Small right paracentral disc protrusion with no spinal stenosis or neural foramina stenosis.   L3/L4: Diffuse disc bulge with mild to moderate bilateral neural foramina stenosis.   L4/L5: Diffuse disc bulge with mild to moderate bilateral neural foramina stenosis.   L5/S1: No disc herniation or stenosis.     CT LUMBAR SPINE   Findings:   There is a congenitally narrow canal, on the basis of short pedicles. There are superimposed extradural degenerative changes from L2-3 through L4-5. There is multilevel degenerative disc disease.   The alignment and vertebral body heights are normal.  There is no evidence of fracture or subluxation.  Sagittal coronal reformatted images demonstrate no subluxation, or further findings.     8/20/24 --- XR HIP WITH PELVIS WHEN PERFORMED 2 OR 3 VIEWS RIGHT     CLINICAL HISTORY:  R hip pain s/p fall; Pain in right hip     TECHNIQUE:  AP view of the pelvis and frog leg lateral view of the right hip were performed.     COMPARISON:  None     FINDINGS:  No fracture or dislocation.  No regional soft tissue abnormalities.     Incidentally noted is lower lumbar degenerative change.     Impression:     Unremarkable appearance of the right hip.        ASSESSMENT: 55 y.o. year old male with pain, consistent with:    Encounter Diagnoses   Name Primary?    Sacroiliac dysfunction Yes    Degenerative lumbar spinal stenosis     Lumbar radiculopathy     Chronic right hip pain     Greater trochanteric bursitis of right hip     Sacroiliitis     Myofascial pain     Other chronic pain        DISCUSSION: Zeeshan Patel is a [place]ian-speaking man who comes to us after a fall. He was carrying a washing machine up some stairs and fell backwards with the machine landing on top of him. Despite ongoing PT he continues to have back pain radiating to the right leg which is worst with extension and flexion as well as axial  loading. Imaging shows L3/4 and 4/L5 bulge with mild to moderate bilateral neural foramina stenosis. Exam shows limited range of motion due to pain and pain to palpation over the spinous processes of the L 3-5 region. Exam shows pain to right SI joint and right hip.     PLAN:  Reviewed prior imaging recently downloaded and discussed with patient.   Elevated BP today. He denies chest pain, headache, or any other red flag symptoms. He is scheduled to see PCP today to discuss BP and BP medications.   Procedure for Right Hip joint and Right GTB under fluoroscopy given history and physical exam.   Continue PT. He has not progressed with 3 rounds of traditional physical therapy. He states they massage him and use the massage gun. Referral today to [facility] for improved core strengthening and lumbar stabilization.  Previously provided patient with home exercises for piriformis and GTB via AVS. Provided information for foam rolling as well. Encouraged daily participation.  He can continue ibuprofen as needed, cautious given history of gastric ulcer.   Continue Methocarbamol 500 mg TID PRN muscle spasms. D/c if stomach upset occurs. Refilled today.  Discussed with patient and  rep, Nila, that I highly recommend the [facility] for this patient.    RTC 2-3 weeks after above procedure with Dr Lewis.   Consider updating right hip MRI if limited relief with injection.     The above plan and management options were discussed at length with patient. Patient is in agreement with the above and verbalized understanding.     Tsering Franks NP   02/03/2025    I spent a total of 30 minutes on the day of the visit.  This includes face to face time and non-face to face time preparing to see the patient by reviewing previous labs/imaging, obtaining and/or reviewing separately obtained history, documenting clinical information in the electronic or other health record, independently interpreting results and  communicating results to the patient/family/caregiver.

## 2025-02-14 ENCOUNTER — PATIENT MESSAGE (OUTPATIENT)
Dept: ADMINISTRATIVE | Facility: OTHER | Age: 55
End: 2025-02-14
Payer: MEDICAID

## 2025-02-17 ENCOUNTER — TELEPHONE (OUTPATIENT)
Dept: URGENT CARE | Facility: CLINIC | Age: 55
End: 2025-02-17
Payer: MEDICAID

## 2025-02-17 NOTE — TELEPHONE ENCOUNTER
[facility] Nila Brown called and cancelled the patients appointment, she states that he will be getting his first injection tomorrow and the doctor wanted to see him after the process. She states that she will call back to reschedule him when they get a appointment with pain management [facility]

## 2025-02-19 ENCOUNTER — HOSPITAL ENCOUNTER (OUTPATIENT)
Facility: OTHER | Age: 55
Discharge: HOME | End: 2025-02-19
Attending: ANESTHESIOLOGY | Admitting: ANESTHESIOLOGY
Payer: COMMERCIAL

## 2025-02-19 VITALS
RESPIRATION RATE: 18 BRPM | DIASTOLIC BLOOD PRESSURE: 76 MMHG | BODY MASS INDEX: 39.78 KG/M2 | HEART RATE: 79 BPM | HEIGHT: 64 IN | OXYGEN SATURATION: 92 % | SYSTOLIC BLOOD PRESSURE: 121 MMHG | WEIGHT: 233 LBS | TEMPERATURE: 99 F

## 2025-02-19 DIAGNOSIS — M16.11 PRIMARY OSTEOARTHRITIS OF RIGHT HIP: Primary | ICD-10-CM

## 2025-02-19 DIAGNOSIS — G89.29 CHRONIC PAIN: ICD-10-CM

## 2025-02-19 PROCEDURE — 25500020 PHARM REV CODE 255: Performed by: ANESTHESIOLOGY

## 2025-02-19 PROCEDURE — 20610 DRAIN/INJ JOINT/BURSA W/O US: CPT | Mod: RT,,, | Performed by: ANESTHESIOLOGY

## 2025-02-19 PROCEDURE — 63600175 PHARM REV CODE 636 W HCPCS: Performed by: ANESTHESIOLOGY

## 2025-02-19 PROCEDURE — 99152 MOD SED SAME PHYS/QHP 5/>YRS: CPT | Performed by: ANESTHESIOLOGY

## 2025-02-19 PROCEDURE — 20610 DRAIN/INJ JOINT/BURSA W/O US: CPT | Mod: RT | Performed by: ANESTHESIOLOGY

## 2025-02-19 RX ORDER — FENTANYL CITRATE 50 UG/ML
INJECTION, SOLUTION INTRAMUSCULAR; INTRAVENOUS
Status: DISCONTINUED | OUTPATIENT
Start: 2025-02-19 | End: 2025-02-19 | Stop reason: HOSPADM

## 2025-02-19 RX ORDER — SODIUM CHLORIDE 9 MG/ML
INJECTION, SOLUTION INTRAVENOUS CONTINUOUS
Status: DISCONTINUED | OUTPATIENT
Start: 2025-02-19 | End: 2025-02-19 | Stop reason: HOSPADM

## 2025-02-19 RX ORDER — TRIAMCINOLONE ACETONIDE 40 MG/ML
INJECTION, SUSPENSION INTRA-ARTICULAR; INTRAMUSCULAR
Status: DISCONTINUED | OUTPATIENT
Start: 2025-02-19 | End: 2025-02-19 | Stop reason: HOSPADM

## 2025-02-19 RX ORDER — BUPIVACAINE HYDROCHLORIDE 2.5 MG/ML
INJECTION, SOLUTION EPIDURAL; INFILTRATION; INTRACAUDAL
Status: DISCONTINUED | OUTPATIENT
Start: 2025-02-19 | End: 2025-02-19 | Stop reason: HOSPADM

## 2025-02-19 RX ORDER — LIDOCAINE HYDROCHLORIDE 20 MG/ML
INJECTION, SOLUTION INFILTRATION; PERINEURAL
Status: DISCONTINUED | OUTPATIENT
Start: 2025-02-19 | End: 2025-02-19 | Stop reason: HOSPADM

## 2025-02-19 RX ORDER — MIDAZOLAM HYDROCHLORIDE 1 MG/ML
INJECTION INTRAMUSCULAR; INTRAVENOUS
Status: DISCONTINUED | OUTPATIENT
Start: 2025-02-19 | End: 2025-02-19 | Stop reason: HOSPADM

## 2025-02-19 NOTE — DISCHARGE INSTRUCTIONS

## 2025-02-19 NOTE — OP NOTE
Greater Trochanteric Bursa and Hip Injection under Fluoroscopic Guidance    The procedure, risks, benefits, and options were discussed with the patient. There are no contraindications to the procedure. The patent expressed understanding and agreed to the procedure. Informed written consent was obtained prior to the start of the procedure and can be found in the patient's chart.    PATIENT NAME: Zeeshan Patel   MRN: [id number]     DATE OF PROCEDURE: 02/19/2025    PROCEDURE:   Right Greater Trochanteric Bursa Injection under Fluoroscopic Guidance  Right Hip Joint Injection under Fluoroscopic Guidance    PRE-OP DIAGNOSIS: Chronic right hip pain [M25.551, G89.29]  Greater trochanteric bursitis of right hip [M70.61]    POST-OP DIAGNOSIS: Same    PHYSICIAN: Amanda Lewis MD    ASSISTANTS: Teofilo Ramirez MD  [facility]      MEDICATIONS INJECTED: Preservative-free Kenalog 40mg with 7cc of Bupivacine 0.25%     LOCAL ANESTHETIC INJECTED: Xylocaine 2%     SEDATION: Versed 1.5mg  and Fentanyl 50mcg                                                                                                                                                                                     Conscious sedation ordered by M.D. Patient re-evaluation prior to administration of conscious sedation. No changes noted in patient's status from initial evaluation. The patient's vital signs were monitored by RN and patient remained hemodynamically stable throughout the procedure.    Event Time In   Sedation Start 1014   Sedation End 1025       ESTIMATED BLOOD LOSS: None    COMPLICATIONS: None    TECHNIQUE: Time-out was performed to identify the patient and procedure to be performed. With the patient laying in a prone position, the surgical area was prepped and draped in the usual sterile fashion using ChloraPrep and a fenestrated drape. The area overlying the greater trochanteric bursa was determined under fluoroscopy guidance. Skin  anesthesia was achieved by injecting Lidocaine 2% over the injection sites. The greater trochanteric bursa was then approached with a 22 gauge, 3.5 inch spinal quinke needle that was introduced under fluoroscopic guidance in the AP view. Once the needle tip was in the area of the bursa, and there was no blood aspiration, Contrast dye  Omnipaque (300mg/mL) was injected to confirm placement and there was no vascular runoff. 4 mL of the medication mixture listed above was injected slowly. The needles were removed and bleeding was nil. A sterile dressing was applied. No specimens collected. The patient tolerated the procedure well.    TECHNIQUE: Time-out was performed to identify the patient and procedure to be performed. With the patient laying in a prone position, the surgical area was prepped and draped in the usual sterile fashion using ChloraPrep and a fenestrated drape. The area overlying the hip joint was determined under fluoroscopy guidance. Skin anesthesia was achieved by injecting Lidocaine 2% over the injection site. The acetabulofemoral joint was then approached with a 22 gauge, 3.5 inch spinal quinke needle that was introduced under fluoroscopic guidance in the AP view. Once the needle tip was in the area of the joint, and there was no blood aspiration,  Contrast dye  Omnipaque (300mg/mL) was injected to confirm placement and there was no vascular runoff. 4 mL of the medication mixture listed above was injected slowly. The needles were removed and bleeding was nil. A sterile dressing was applied. No specimens collected. The patient tolerated the procedure well.      The patient was monitored after the procedure in the recovery area. They were given post-procedure and discharge instructions to follow at home. The patient was discharged in a stable condition.    Ramiro Frias MD     I reviewed and edited the fellow's note. I conducted my own interview and physical examination. I agree with the findings. I  was present and supervising all critical portions of the procedure.

## 2025-02-19 NOTE — DISCHARGE SUMMARY
Discharge Note  Short Stay      SUMMARY     Admit Date: 2/19/2025    Attending Physician: Amanda Lewis MD    Discharge Physician: Amanda Lewis MD      Discharge Date: 2/19/2025 10:15 AM    Procedure(s) (LRB):  INJECTION, RIGHT HIP AND RIGHT GTB (Right)    Final Diagnosis: Chronic right hip pain [M25.551, G89.29]  Greater trochanteric bursitis of right hip [M70.61]    Disposition: Home or self care    Patient Instructions:   Current Discharge Medication List        CONTINUE these medications which have NOT CHANGED    Details   celecoxib (CELEBREX) 100 MG capsule Take 1 capsule (100 mg total) by mouth daily as needed for Pain.  Qty: 30 capsule, Refills: 1    Associated Diagnoses: Degenerative lumbar spinal stenosis      famotidine (PEPCID) 40 MG tablet Take 40 mg by mouth 2 (two) times daily.      losartan-hydrochlorothiazide 50-12.5 mg (HYZAAR) 50-12.5 mg per tablet 1 tablet Orally Once a day for 90 days      !! metFORMIN (GLUCOPHAGE-XR) 500 MG ER 24hr tablet TOME MARISA TABLETA TODOS LOS D AS WITH EVENING MEAL FOR 90 DAYS      !! metFORMIN (GLUCOPHAGE-XR) 500 MG ER 24hr tablet 1 tablet with evening meal Orally Once a day for 90 days      methocarbamoL (ROBAXIN) 500 MG Tab Take 1 tablet (500 mg total) by mouth 3 (three) times daily as needed (spasm).  Qty: 30 tablet, Refills: 2    Associated Diagnoses: Piriformis syndrome of right side      pantoprazole (PROTONIX) 40 MG tablet Take 40 mg by mouth every morning.      pravastatin (PRAVACHOL) 40 MG tablet Take 40 mg by mouth.      triamcinolone acetonide 0.1% (KENALOG) 0.1 % cream Apply topically 2 (two) times daily.       !! - Potential duplicate medications found. Please discuss with provider.              Discharge Diagnosis: Chronic right hip pain [M25.551, G89.29]  Greater trochanteric bursitis of right hip [M70.61]  Condition on Discharge: Stable with no complications to procedure   Diet on Discharge: Same as before.  Activity: as per instruction  sheet.  Discharge to: Home with a responsible adult.  Follow up: 2-4 weeks       Please call my office or pager at [phone] if experienced any weakness or loss of sensation, fever > 101.5, pain uncontrolled with oral medications, persistent nausea/vomiting/or diarrhea, redness or drainage from the incisions, or any other worrisome concerns. If physician on call was not reached or could not communicate with our office for any reason please go to the nearest emergency department     Ramiro Frias M.D.  PGY-5  Interventional Pain Management Fellow  [facility]  Pager: [phone]

## 2025-02-27 ENCOUNTER — PATIENT MESSAGE (OUTPATIENT)
Dept: ADMINISTRATIVE | Facility: OTHER | Age: 55
End: 2025-02-27
Payer: MEDICAID

## 2025-02-27 ENCOUNTER — CLINICAL SUPPORT (OUTPATIENT)
Dept: REHABILITATION | Facility: HOSPITAL | Age: 55
End: 2025-02-27
Payer: COMMERCIAL

## 2025-02-27 DIAGNOSIS — M53.3 SACROILIAC DYSFUNCTION: ICD-10-CM

## 2025-02-27 DIAGNOSIS — M48.061 DEGENERATIVE LUMBAR SPINAL STENOSIS: ICD-10-CM

## 2025-02-27 DIAGNOSIS — M54.16 LUMBAR RADICULOPATHY: ICD-10-CM

## 2025-02-27 DIAGNOSIS — M53.86 DECREASED RANGE OF MOTION OF LUMBAR SPINE: Primary | ICD-10-CM

## 2025-02-27 PROCEDURE — 97162 PT EVAL MOD COMPLEX 30 MIN: CPT

## 2025-02-27 NOTE — PROGRESS NOTES
[facility]   Physical Therapy Lumbar Evaluation      Name: Zeeshan Patel  Clinic Number: [id number]    Therapy Diagnosis:   Encounter Diagnoses   Name Primary?    Sacroiliac dysfunction     Degenerative lumbar spinal stenosis     Lumbar radiculopathy     Decreased range of motion of lumbar spine Yes     Physician: Tsering Franks NP    Physician Orders: PT Eval and Treat  Medical Diagnosis from Referral: M53.3 (ICD-10-CM) - Sacroiliac dysfunction M48.061 (ICD-10-CM) - Degenerative lumbar spinal stenosis M54.16 (ICD-10-CM) - Lumbar radiculopathy  Evaluation Date: 2/27/2025  Authorization Period Expiration: 2/3/2026  Plan of Care Expiration: 5/22/2025  Reassessment Due: 4/5/2025  Visit # / Visits authorized: 1/1  MedX testing visit 2    Time In: 8:10 AM  Time Out: 9:00 AM  Total Billable Time: 50 minutes  INSURANCE and OUTCOMES: Fee for Service with FOTO Outcomes 1/3    Precautions: standard, HTN  Restrictions: No lifting/pushing/pulling more than 10 lbs, Avoid frequent bending/lifting/twisting, Avoid climbing/kneeling/squatting     Pattern of pain determined: 1 PEN, 5    Subjective     Date of onset: 7/31/2023  History of current condition: Zeeshan reports falling backwards when walking up the stairs at work.  He landed on his back and right hip with washing machine landing on top of him.  He denies pain prior to fall.  He went to three rounds of 12 sessions at [facility], he says they discharge him last week without any improvement.  He has not been able to return to work due to pain.  Pain starts after standing for more than 10 minutes, walking for more than 10 minutes.  He only gets relief when lying down.        Medical History:   Past Medical History:   Diagnosis Date    GERD (gastroesophageal reflux disease)     Ulcer        Surgical History:   Zeeshan Patel  has a past surgical history that includes Appendectomy; left shoulder; Wrist Arthroplasty;  Epidural steroid injection (N/A, 11/19/2024); injection, sacroiliac joint (Right, 12/31/2024); and Injection of anesthetic agent around nerve (Right, 2/19/2025).    Medications:   Zeeshan has a current medication list which includes the following prescription(s): celecoxib, famotidine, losartan-hydrochlorothiazide 50-12.5 mg, metformin, metformin, methocarbamol, pantoprazole, pravastatin, and triamcinolone acetonide 0.1%.    Allergies:   Review of patient's allergies indicates:  No Known Allergies     Imaging: MRI   MRI LUMBAR SPINE     Clinical data: Back pain.     Procedure:  Sagittal and axial, multi-sequence MR images through the lumbar spine were obtained.     Findings:   The alignment, vertebral body heights and marrow signal intensity are normal.   The conus medullaris terminates at the normal level and is normal in signal intensity.        T12/L1/L2: Minimal central disc bulge with no stenosis.   L2/L3: Small right paracentral disc protrusion with no spinal stenosis or neural foramina stenosis.   L3/L4: Diffuse disc bulge with mild to moderate bilateral neural foramina stenosis.   L4/L5: Diffuse disc bulge with mild to moderate bilateral neural foramina stenosis.   L5/S1: No disc herniation or stenosis.  Exam End: 07/31/24 17:37       Prior Therapy: yes  Prior Treatment: injection, physical therapy   Social History:  lives with their spouse  Occupation:  for [employer]  Leisure: fishing      Prior Level of Function: independent  Current Level of Function: independent  DME owned/used: none  Gym Membership: no    Pain:  Current 5/10, worst 9/10, best 2/10   Location: right low back and into right hip  Description: unable to describe  Aggravating Factors: Standing, Walking, and Getting out of bed/chair  Easing Factors:  sitting  Disturbed Sleep: ye    Pattern of pain questions:  1.  Where is your pain the worst? back  2.  Is your pain constant or intermittent? constant  3.  Does bending  "forward make your typical pain worse? yes  4.  Since the start of your back pain, has there been a change in your bowel or bladder? no  5.  What can't you do now that you use to be able to do? Perform work duties (has 10 lb lifting restriction)    Pts goals: "get relief from the pain"    Red Flag Screening:   Cough/Sneeze Strain: (--)  Bladder/Bowel: (--)  Falls: (+)   Night pain: (--)  Unexplained weight loss: (--)  General health: good    Objective      BP at rest: 138/82 mmHg   BP during evaluation 160/102 mmHg (taken when patient reported feeling that BP was high)   BP taken after seated 5 min rest: 152/94 mmHg      Postural examination/scapula alignment: Rounded shoulder, Head forward, and Slouched posture  Correction of posture: better with lumbar roll  Sitting: slouched  Standing: mild anterior pelvic tllt    MOVEMENT LOSS - Lumbar   Norms ROM Loss Initial   Flexion Fingers touch toes, sacral angle >/= 70 deg, uniform spinal curvature, posterior weight shift  moderate loss and pain end range   Extension ASIS surpasses toes, spine of scapulae surpasses heels, uniform spinal curve minimal loss and pain end range   Side glide Right  moderate loss   Side glide Left  minimal loss   Rotation Right PT observes contralateral shoulder minimal loss   Rotation Left PT observes contralateral shoulder minimal loss     Lower Extremity Strength  Right LE  Left LE    Hip flexion: 4/5 (pt guarded) Hip flexion: 4+/5   Hip extension:  4-/5 Hip extension: 4-/5   Hip abduction: 4+/5 Hip abduction: 4+/5   Hip adduction:  4+/5 Hip adduction:  4+/5   Hip External Rotation 4/5 Hip External Rotation 4+/5   Hip Internal rotation   4/5 Hip Internal rotation 4+/5   Knee Flexion 4+/5 Knee Flexion 4+/5   Knee Extension 4+/5 Knee Extension 4+/5   Ankle dorsiflexion: 4+/5 Ankle dorsiflexion: 5/5   Ankle plantarflexion: 4+/5 Ankle plantarflexion: 5/5     GAIT:  Assistive Device used: none  Level of Assistance: independent  Patient displays " the following gait deviations: decreased step length and antalgic gait.     Special Tests:   Test Name  Test Result   Prone Instability Test (--)   SI Joint Provocation Test (+) right (pt reports pain with all movements of right lower extremity )   Straight Leg Raise (+)   Neural Tension Test (+) subjective report of pain   Crossed Straight Leg Raise (--)   Walking on toes Able   Walking on heels  Able     Supine to long sit assessment - no pelvic obliquity noted    NEUROLOGICAL SCREENING:     Sensory deficits: intact to light touch    Reflexes:    Left Right   Patella Tendon 1+ 1+   Achilles Tendon 1+ 1+   Babinski  NT NT   Clonus (--) (--)     REPEATED TEST MOVEMENTS:    Baseline symptoms:  Repeated Flexion in Standing worse   Repeated Extension in Standing worse   Repeated Flexion in lying worse   Repeated Extension in lying  worse       STATIC TESTS and other movements:   Prone lie worse   Prone lie on elbows worse   Sitting slouched  no effect   Sitting erect no effect   Standing slouched no effect   Standing erect  no effect   Lying prone in extension  worse   Long sitting   worse   Sustained flexion worse   Sustained prone using mat NT       Intake Outcome Measure for FOTO Lumbar Survey    Therapist reviewed FOTO scores for Zeeshan Patel on 2/27/2025.   FOTO report - see Media section or FOTO account episode details.    Intake Score: 38% functional ability  Goal: 55% functional ability             Treatment     Total Treatment time separate from Evaluation: 10 minutes    Zeeshan received therapeutic exercises to develop/improve posture, lumbar ROM, strength, and muscular endurance for 10 minutes including the following exercises:     LTR x 10  DKTC x 10  PPT x 10    Written Home Exercises Provided: yes.    HEP AS FOLLOWS:  LTR, DKTC, PPT    Exercises were reviewed and Zeeshan was able to demonstrate them prior to the end of the session. Zeeshan demonstrated fair  understanding of the education  provided.     See EMR under Patient Instructions for exercises provided 2/27/2025.      Assessment     Zeeshan is a 55 y.o. male referred to [facility] with a medical diagnosis of M53.3 (ICD-10-CM) - Sacroiliac dysfunction M48.061 (ICD-10-CM) - Degenerative lumbar spinal stenosis M54.16 (ICD-10-CM) - Lumbar radiculopathy. Pt presents with low back pain with lumbar and hip range of motion, decreased tolerance for movement into both flexion and extension, pain with bilateral hip range of motion, decreased core and bilateral lower extremity strength, gait impairments, limitations with bending and lifting which are negatively impacting patient's daily activity and ability to perform work duties.  All range of motion was limited by pain and patient's blood pressure increased significantly during assessment to 160/102 mmHg with minimal physical exertion.  Due to poor control of blood pressure and increased activity performed in [facility] program, patient would be more appropriate to start therapy either in aquatics or in conventional physical therapy until he is more appropriate for the [facility] program.      Pain Pattern: 1 PEN, 5       Pt prognosis is Fair.     Pt will benefit from skilled outpatient Physical Therapy to address the deficits stated above and in the chart below, to provide pt/family education, and to maximize pt's level of independence. Based on the above history and physical examination an active physical therapy program is recommended.      Plan of care discussed with patient: Yes  Pt's spiritual, cultural and educational needs considered and patient is agreeable to the plan of care and goals as stated below:     Anticipated Barriers for therapy: chronicity of impairments, poor progress with therapy to date    PT Evaluation Completed? Yes    Medical necessity is demonstrated by the following problem list:    History  Co-morbidities and personal factors that may impact the plan of care []  LOW: no personal factors / co-morbidities  [x] MODERATE: 1-2 personal factors / co-morbidities  [] HIGH: 3+ personal factors / co-morbidities    Moderate / High Support Documentation:   Co-morbidities affecting plan of care: uncontrolled HTN    Personal Factors:   coping style     Examination  Body Structures and Functions, activity limitations and participation restrictions that may impact the plan of care [] LOW: addressing 1-2 elements  [x] MODERATE: 3+ elements  [] HIGH: 4+ elements (please support below)    Moderate / High Support Documentation: decreased lumbar and hip range of motion, decreased strength, decreased activity tolerance due to pain     Clinical Presentation [] LOW: stable  [x] MODERATE: Evolving  [] HIGH: Unstable     Decision Making/ Complexity Score: moderate         GOALS: Pt is in agreement with the following goals.    Short Term Goals: (6 weeks)  1. Patient will demonstrate good transverse abdominal muscle contraction for improved deep abdominal strength and lumbar stability.  2. Increase lumbar ROM to 100% of WNL in order to improve functional mobility.     3. Patient will demonstrate good sitting/standing posture and body mechanics for improved spine health and decreased risk of future injury.   4. Patient will improve bilateral lower extremity MMT grades by >/=1/2 grade in order to improve strength for standing ADLs.   5. Patient will be compliant with home exercise program to supplement therapy in promoting functional mobility.    Long Term Goals (12 Weeks):   1. Patient will improve FOTO score to </= 45% limited to decrease perceived limitation with maintaining/changing body position.   2. Patient will be 100% compliant with updated HEP in order to maximize PT benefits post-discharge.  3. Patient will improve bilateral lower extremity MMT grades by >/=1 grade in order to improve strength for standing activities of daily living.  4. Patient will report pain at worst over 2-week period as  </=2/10 in order to improve general activity tolerance.   5. Pt will begin some form of home/community fitness in order to sustain progress gained in PT  6. Pt to demonstrate negative SLR and/or Slump Test in order to show improved core strength and decreased nerve/dural tension.         Plan          Outpatient physical therapy 2x week for 12 weeks to include the following:   - Patient education  - Therapeutic exercise  - Manual therapy  - Performance testing   - Neuromuscular Re-education  - Therapeutic activity   - Modalities    Pt may be seen by PTA as part of the rehabilitation team.     Therapist: Paige Alatorre, PT  3/5/2025

## 2025-03-05 PROBLEM — M53.86 DECREASED RANGE OF MOTION OF LUMBAR SPINE: Status: ACTIVE | Noted: 2025-03-05

## 2025-03-12 ENCOUNTER — OFFICE VISIT (OUTPATIENT)
Dept: PAIN MEDICINE | Facility: CLINIC | Age: 55
End: 2025-03-12
Payer: COMMERCIAL

## 2025-03-12 ENCOUNTER — TELEPHONE (OUTPATIENT)
Dept: PAIN MEDICINE | Facility: CLINIC | Age: 55
End: 2025-03-12
Payer: MEDICAID

## 2025-03-12 VITALS
HEIGHT: 64 IN | DIASTOLIC BLOOD PRESSURE: 91 MMHG | WEIGHT: 233 LBS | HEART RATE: 102 BPM | BODY MASS INDEX: 39.78 KG/M2 | SYSTOLIC BLOOD PRESSURE: 133 MMHG

## 2025-03-12 DIAGNOSIS — G89.29 CHRONIC RIGHT HIP PAIN: ICD-10-CM

## 2025-03-12 DIAGNOSIS — G57.01 PIRIFORMIS SYNDROME OF RIGHT SIDE: ICD-10-CM

## 2025-03-12 DIAGNOSIS — M79.18 MYOFASCIAL PAIN: ICD-10-CM

## 2025-03-12 DIAGNOSIS — G89.29 OTHER CHRONIC PAIN: ICD-10-CM

## 2025-03-12 DIAGNOSIS — M53.3 COCCYDYNIA: ICD-10-CM

## 2025-03-12 DIAGNOSIS — M25.551 CHRONIC RIGHT HIP PAIN: ICD-10-CM

## 2025-03-12 DIAGNOSIS — M16.11 PRIMARY OSTEOARTHRITIS OF RIGHT HIP: Primary | ICD-10-CM

## 2025-03-12 DIAGNOSIS — M70.61 GREATER TROCHANTERIC BURSITIS OF RIGHT HIP: ICD-10-CM

## 2025-03-12 DIAGNOSIS — M53.3 SACROILIAC DYSFUNCTION: ICD-10-CM

## 2025-03-12 DIAGNOSIS — M54.9 DORSALGIA, UNSPECIFIED: ICD-10-CM

## 2025-03-12 DIAGNOSIS — M54.16 LUMBAR RADICULOPATHY: ICD-10-CM

## 2025-03-12 NOTE — PROGRESS NOTES
Interventional Pain Management - Established Visit  Follow-Up     PCP: Andrzej Andujar MD    REFERRING PHYSICIAN: Marlon Mendes    CHIEF COMPLAINT: lower back pain after work injury    Original HISTORY OF PRESENT ILLNESS: Zeeshan Patel presents to the clinic for the evaluation of the above pain. The pain started July 31st after a fall at work while carrying a washing machine with co-workers. The machine fell backwards onto him.     Original Pain Description:  The pain is located in the lower back and is radiating to the right upper thigh . The pain is described as aching, sharp, shooting, throbbing, and tingling. Exacerbating factors: Sitting, Standing, Bending, Walking, Extension, Flexing, and Lifting. Mitigating factors heat, laying down, massage, medications, physical therapy, and rest. Symptoms interfere with daily activity, sleeping, and work. He states that he can not sleep on his right side due to pain but can on his left. He has been getting nearly 6 hours of sleep a night since the accident and needs to put a pillow in between his legs for comfort. The patient feels like symptoms have been worsening. Patient denies night fever/night sweats, urinary incontinence, bowel incontinence, significant weight loss, significant motor weakness, and loss of sensations.    Original PAIN SCORES:  Best: Pain is 5  Worst: Pain is 9  Current: Pain is 7        3/12/2025    10:47 AM   Last 3 PDI Scores   Pain Disability Index (PDI) 49       INTERVAL HISTORY: (Newest visit at the bottom)   Interval History (12/9/2024):    [id number] utilized during today's visit    Zeeshan Patel returns to clinic for follow-up after L5/S1 ILESI to the right on 11/19/2024. He reports 50% relief of back pain. He continues with right lower back pain and right lateral hip pain. The pain in the lower back is worse with walking, sitting, standing. He has taken Celebrex and Flexeril, however, these caused stomach  upset. He has a history of gastric ulcer. He discontinued these. He is still seeing  and will be starting PT again as this provided good relief. He denies recent health changes. He denies recent falls or trauma. He denies new onset fever/night sweats, urinary incontinence, bowel incontinence, significant weight changes, significant motor weakness or changes, or loss of sensations. His pain today is 4/10.      Interval History 1/15/2025:   code: [id number]    Zeeshan Patel returns to follow-up after right SI joint and right piriformis injection on 12/31/2024. He reports 60% relief without activity. He continues with lower back pain without radiation. The pain is worse with walking, standing and sitting. He continues physical therapy three times per week. He continues with home exercises. He continues ibuprofen and methocarbamol with good relief. He is still working with . Nila, his  representative, is present via phone call during his visit. He denies recent health changes. He denies recent falls or trauma. He denies new onset fever/night sweats, urinary incontinence, bowel incontinence, significant weight changes, significant motor weakness or changes, or loss of sensations. His pain today is 2/10. With activity 5-6/10.    Interval History 2/3/2025:  Zeeshan Patel returns for follow-up for imaging review.     Nila Zhao, nurse  with  is present during the visit. Cannot climb stairs due to hip pain. PT has not been helpful. He states the sessions have not been challenging and he reports massage and massage gun with limited exercise or strengthening during sessions. He denies recent health changes. He denies recent falls or trauma. He denies new onset fever/night sweats, urinary incontinence, bowel incontinence, significant weight changes, significant motor weakness or changes, or loss of sensations. His pain today is 4/10.  With activity, the pain is  6/10.    Interval History 3/12/2025:  Zeeshan Patel returns to clinic for follow-up after right hip joint and right GTB injection on 2/19/2025. He reports limited pain relief. He continues with right hip/lower back pain with walking and standing too long. Since his last office visit, he had a long trip to [place]. He has had increased pain to the tailbone and his right buttock. The buttock pain is worse with walking. He was recently diagnosed with sleep apnea. He has been having issues with increased blood pressure. He had his initial evaluation with [facility], however, he had increased blood pressure during the session and so they wanted him to be evaluated for his blood pressure and for him to transition to [facility]. He denies recent falls or trauma. He denies new onset fever/night sweats, urinary incontinence, bowel incontinence, significant weight changes, significant motor weakness or changes, or loss of sensations. His pain today is 7/10.      6 weeks of Conservative therapy:  PT: 8/5/24 to current date (three times a week)  HEP: most days      Treatments / Medications: (Ice/Heat/NSAIDS/APAP/etc):  Ibuprofen, methocarbamol       Interventional Pain Procedures: (Previous injections)  11/19/2024 - L5/S1 ILESI (to the right) - 50% relief of back pain x 2 weeks  12/31/2024 - right SI joint and right piriformis - 60% relief   2/19/2025 - right hip and right GTB - limited relief    Past Medical History:   Diagnosis Date    GERD (gastroesophageal reflux disease)     Ulcer      Past Surgical History:   Procedure Laterality Date    APPENDECTOMY      EPIDURAL STEROID INJECTION N/A 11/19/2024    Procedure: LUMBAR L5/S1 FABY (TO THE RIGHT);  Surgeon: Amanda Lewis MD;  Location: [facility]IN [facility];  Service: Pain Management;  Laterality: N/A;  [phone]  2 WK F/U KELSI    INJECTION OF ANESTHETIC AGENT AROUND NERVE Right 2/19/2025    Procedure: INJECTION, RIGHT HIP AND RIGHT GTB;  Surgeon: Joshua  MD Amanda;  Location: [facility];  Service: Pain Management;  Laterality: Right;  2 WK F/U NIKO    INJECTION, SACROILIAC JOINT Right 12/31/2024    Procedure: INJECTION,SACROILIAC JOINT RIGHT AND RIGHT PIRIFORMIS;  Surgeon: Amanda Lewis MD;  Location: [facility];  Service: Pain Management;  Laterality: Right;  3 WK F/U EVIE    left shoulder      WRIST ARTHROPLASTY       Social History     Socioeconomic History    Marital status:    Tobacco Use    Smoking status: Every Day    Smokeless tobacco: Never   Substance and Sexual Activity    Alcohol use: No    Drug use: No     Family History   Problem Relation Name Age of Onset    Cancer Mother      Kidney disease Mother      Hypertension Mother      Hypertension Father      Hypertension Maternal Aunt thyroid cancer     Diabetes Maternal Grandfather colon        Review of patient's allergies indicates:  No Known Allergies    Current Outpatient Medications   Medication Sig    methocarbamoL (ROBAXIN) 500 MG Tab Take 1 tablet (500 mg total) by mouth 3 (three) times daily as needed (spasm).    pravastatin (PRAVACHOL) 40 MG tablet Take 40 mg by mouth.    celecoxib (CELEBREX) 100 MG capsule Take 1 capsule (100 mg total) by mouth daily as needed for Pain. (Patient not taking: Reported on 2/3/2025)    famotidine (PEPCID) 40 MG tablet Take 40 mg by mouth 2 (two) times daily.    losartan-hydrochlorothiazide 50-12.5 mg (HYZAAR) 50-12.5 mg per tablet 1 tablet Orally Once a day for 90 days    metFORMIN (GLUCOPHAGE-XR) 500 MG ER 24hr tablet TOME MARISA TABLETA TODOS LOS D AS WITH EVENING MEAL FOR 90 DAYS (Patient not taking: Reported on 2/3/2025)    metFORMIN (GLUCOPHAGE-XR) 500 MG ER 24hr tablet 1 tablet with evening meal Orally Once a day for 90 days (Patient not taking: Reported on 3/12/2025)    pantoprazole (PROTONIX) 40 MG tablet Take 40 mg by mouth every morning.    triamcinolone acetonide 0.1% (KENALOG) 0.1 % cream Apply topically 2 (two) times daily. (Patient  "not taking: Reported on 2/3/2025)     No current facility-administered medications for this visit.       ROS:  GENERAL: No fever. No chills. No fatigue. Denies weight loss. Denies weight gain.  HEENT: Denies headaches. Denies vision change. Denies eye pain. Denies double vision. Denies ear pain.   CV: Denies chest pain.   PULM: Denies of shortness of breath.  GI: Denies constipation. No diarrhea. No abdominal pain. Denies nausea. Denies vomiting. No blood in stool.  HEME: Denies bleeding problems.  : Denies urgency. No painful urination. No blood in urine.  MS: Denies joint stiffness. Denies joint swelling. Endorses lower back pain that radiates to the right thigh.  SKIN: Denies rash.   NEURO: Denies seizures. No weakness.  PSYCH:  Denies difficulty sleeping. No anxiety. Denies depression. No suicidal thoughts.       VITALS:   Vitals:    03/12/25 1049   BP: (!) 133/91   Pulse: 102   Weight: 105.7 kg (233 lb 0.4 oz)   Height: 5' 4" (1.626 m)   PainSc:   7         PHYSICAL EXAM:   GENERAL: Well appearing, in no acute distress, alert and oriented x3.  PSYCH:  Mood and affect appropriate.  SKIN: Skin color, texture, turgor normal, no rashes or lesions.  HEENT:  Normocephalic, atraumatic. Cranial nerves grossly intact.  NECK: No pain to palpation over the cervical paraspinous muscles. No pain to palpation over facets. No pain with neck flexion, extension, or lateral flexion.   PULM: No evidence of respiratory difficulty, symmetric chest rise.  GI:  Non-distended  BACK: Full ROM with pain on extension and axial loading to the right > flexion. SLR negative in seated position. No pain to palpation over lumbar spine or facet joints bilaterally. Mild pain to palpation over right SI joint. Pain to palpation over coccyx. Negative FADIR   EXTREMITIES: No deformities, edema, or skin discoloration.   MUSCULOSKELETAL: Dion's and FADIR positive to lateral hip and groin pain to the right. Pain to right groin and hip with hip " flexion. Mild pain to palpation over right piriformis. Pain to palpation over right GTB.  No atrophy is noted.  NEURO: Sensation is equal and appropriate bilaterally. Bilateral upper and lower extremity strength is normal and symmetric. Plantar response are downgoing.    GAIT: normal.      LABS:  BUN/Cr: 13/0.9    IMAGING:    XR HIP WITH PELVIS WHEN PERFORMED 2 OR 3 VIEWS RIGHT     CLINICAL HISTORY:  R hip pain s/p fall; Pain in right hip     TECHNIQUE:  AP view of the pelvis and frog leg lateral view of the right hip were performed.     COMPARISON:  None     FINDINGS:  No fracture or dislocation.  No regional soft tissue abnormalities.     Incidentally noted is lower lumbar degenerative change.     Impression:     Unremarkable appearance of the right hip.        Electronically signed by:Chet Nuñez  Date:                                            08/20/2024  Time:                                           09:33    7/31/24 --- MRI LUMBAR SPINE     Clinical data: Back pain.     Procedure:  Sagittal and axial, multi-sequence MR images through the lumbar spine were obtained.     Findings:   The alignment, vertebral body heights and marrow signal intensity are normal.   The conus medullaris terminates at the normal level and is normal in signal intensity.        T12/L1/L2: Minimal central disc bulge with no stenosis.   L2/L3: Small right paracentral disc protrusion with no spinal stenosis or neural foramina stenosis.   L3/L4: Diffuse disc bulge with mild to moderate bilateral neural foramina stenosis.   L4/L5: Diffuse disc bulge with mild to moderate bilateral neural foramina stenosis.   L5/S1: No disc herniation or stenosis.     CT LUMBAR SPINE   Findings:   There is a congenitally narrow canal, on the basis of short pedicles. There are superimposed extradural degenerative changes from L2-3 through L4-5. There is multilevel degenerative disc disease.   The alignment and vertebral body heights are normal.  There is no  evidence of fracture or subluxation.  Sagittal coronal reformatted images demonstrate no subluxation, or further findings.     8/20/24 --- XR HIP WITH PELVIS WHEN PERFORMED 2 OR 3 VIEWS RIGHT     CLINICAL HISTORY:  R hip pain s/p fall; Pain in right hip     TECHNIQUE:  AP view of the pelvis and frog leg lateral view of the right hip were performed.     COMPARISON:  None     FINDINGS:  No fracture or dislocation.  No regional soft tissue abnormalities.     Incidentally noted is lower lumbar degenerative change.     Impression:     Unremarkable appearance of the right hip.        ASSESSMENT: 55 y.o. year old male with pain, consistent with:    Encounter Diagnoses   Name Primary?    Primary osteoarthritis of right hip Yes    Chronic right hip pain     Dorsalgia, unspecified     Other chronic pain     Sacroiliac dysfunction     Greater trochanteric bursitis of right hip     Myofascial pain     Lumbar radiculopathy     Piriformis syndrome of right side     Coccydynia        DISCUSSION: Zeeshan Patel is a [place]-speaking man who comes to us after a fall. He was carrying a washing machine up some stairs and fell backwards with the machine landing on top of him. Despite ongoing PT he continues to have back pain radiating to the right leg which is worst with extension and flexion as well as axial loading. Imaging shows L3/4 and 4/L5 bulge with mild to moderate bilateral neural foramina stenosis. Exam shows limited range of motion due to pain and pain to palpation over the spinous processes of the L 3-5 region. Exam shows pain to right SI joint and right hip.     PLAN:  Reviewed prior imaging recently downloaded and discussed with patient.   He is s/p Right Hip joint and Right GTB under fluoroscopy without relief  Update MRI lumbar spine per [facility] for ease of review. RTC to review with Dr Lewis.   Referral to Ortho to evaluate continued hip pain.   Previously provided patient with home exercises for piriformis  and GTB via AVS. Provided information for foam rolling as well. Encouraged daily participation.  He can continue ibuprofen as needed, cautious given history of gastric ulcer.   Continue Methocarbamol 500 mg TID PRN muscle spasms. D/c if stomach upset occurs. Refilled today.  Discussed with patient and  rep, Nila, that I highly recommend the [facility] program or Aqu[facility] Therapy for this patient.    RTC after imaging to review with Dr Lewis and plan for additional interventions if appropriate.   He can continue with light duty for work. FCE may be indicated in the future.     The above plan and management options were discussed at length with patient. Patient is in agreement with the above and verbalized understanding.     Tsering Franks NP   03/12/2025    I spent a total of 30 minutes on the day of the visit.  This includes face to face time and non-face to face time preparing to see the patient by reviewing previous labs/imaging, obtaining and/or reviewing separately obtained history, documenting clinical information in the electronic or other health record, independently interpreting results and communicating results to the patient/family/caregiver.        Aquatic therapy

## 2025-04-07 ENCOUNTER — OFFICE VISIT (OUTPATIENT)
Dept: PAIN MEDICINE | Facility: CLINIC | Age: 55
End: 2025-04-07
Payer: COMMERCIAL

## 2025-04-07 VITALS
OXYGEN SATURATION: 95 % | WEIGHT: 232.81 LBS | HEIGHT: 64 IN | RESPIRATION RATE: 16 BRPM | TEMPERATURE: 98 F | BODY MASS INDEX: 39.75 KG/M2 | HEART RATE: 99 BPM | DIASTOLIC BLOOD PRESSURE: 86 MMHG | SYSTOLIC BLOOD PRESSURE: 126 MMHG

## 2025-04-07 DIAGNOSIS — G57.01 PIRIFORMIS SYNDROME OF RIGHT SIDE: ICD-10-CM

## 2025-04-07 DIAGNOSIS — M47.817 LUMBOSACRAL SPONDYLOSIS WITHOUT MYELOPATHY: Primary | ICD-10-CM

## 2025-04-07 PROCEDURE — 99999 PR PBB SHADOW E&M-EST. PATIENT-LVL III: CPT | Mod: PBBFAC,,, | Performed by: ANESTHESIOLOGY

## 2025-04-07 PROCEDURE — 99214 OFFICE O/P EST MOD 30 MIN: CPT | Mod: S$GLB,,, | Performed by: ANESTHESIOLOGY

## 2025-04-07 RX ORDER — METHOCARBAMOL 500 MG/1
500 TABLET, FILM COATED ORAL 3 TIMES DAILY PRN
Qty: 30 TABLET | Refills: 2 | Status: SHIPPED | OUTPATIENT
Start: 2025-04-07

## 2025-04-07 RX ORDER — LOSARTAN POTASSIUM 50 MG/1
50 TABLET ORAL DAILY
COMMUNITY

## 2025-04-07 RX ORDER — HYDROCHLOROTHIAZIDE 12.5 MG/1
12.5 TABLET ORAL DAILY
COMMUNITY

## 2025-04-07 NOTE — LETTER
April 7, 2025      [facility]  [address]  [facility] [address]  Phone: [phone]  Fax: [phone]       Patient: Zeeshan Patel   YOB: 1970  Date of Visit: 04/07/2025    To Whom It May Concern:    Dedrick Patel  was at [facility] on 04/07/2025. The patient may return to work on 04/08/25 with the following restrictions: no lifting/pushing/pulling more than 10 lbs. Avoid frequent bending/lifting/twisting, Avoid climbing/kneeling/squatting.  If you have any questions or concerns, or if I can be of further assistance, please do not hesitate to contact me.    Sincerely,    Felicitas Montez LPN  for   Dr. Lewis

## 2025-04-07 NOTE — PROGRESS NOTES
Interventional Pain Management - Established Visit  Follow-Up     PCP: Andrzej Andujar MD    REFERRING PHYSICIAN: Marlon Mendes    CHIEF COMPLAINT: lower back pain after work injury    Original HISTORY OF PRESENT ILLNESS: Zeeshan Patel presents to the clinic for the evaluation of the above pain. The pain started July 31st after a fall at work while carrying a washing machine with co-workers. The machine fell backwards onto him.     Original Pain Description:  The pain is located in the lower back and right thigh. The pain is described as aching, sharp, shooting, throbbing, and tingling. Exacerbating factors: Sitting, Standing, Bending, Walking, Extension, Flexing, and Lifting. Mitigating factors heat, laying down, massage, medications, physical therapy, and rest. Symptoms interfere with daily activity, sleeping, and work. He states that he can not sleep on his right side due to pain but can on his left. He has been getting nearly 6 hours of sleep a night since the accident and needs to put a pillow in between his legs for comfort. The patient feels like symptoms have been worsening. Patient denies night fever/night sweats, urinary incontinence, bowel incontinence, significant weight loss, significant motor weakness, and loss of sensations.    Original PAIN SCORES:  Best: Pain is 5  Worst: Pain is 9  Current: Pain is 7        3/12/2025    10:47 AM   Last 3 PDI Scores   Pain Disability Index (PDI) 49       INTERVAL HISTORY: (Newest visit at the bottom)   Interval History (12/9/2024):    [id number] utilized during today's visit: Zeeshan Patel returns to clinic for follow-up after L5/S1 ILESI to the right on 11/19/2024. He reports 50% relief of back pain. He continues with right lower back pain and right lateral hip pain. The pain in the lower back is worse with walking, sitting, standing. He has taken Celebrex and Flexeril, however, these caused stomach upset. He has a history of  gastric ulcer. He discontinued these. He is still seeing  and will be starting PT again as this provided good relief. He denies recent health changes. He denies recent falls or trauma. He denies new onset fever/night sweats, urinary incontinence, bowel incontinence, significant weight changes, significant motor weakness or changes, or loss of sensations. His pain today is 4/10.      Interval History 1/15/2025:   code: [id number]: Zeeshan Patel returns to follow-up after right SI joint and right piriformis injection on 12/31/2024. He reports 60% relief without activity. He continues with lower back pain without radiation. The pain is worse with walking, standing and sitting. He continues physical therapy three times per week. He continues with home exercises. He continues ibuprofen and methocarbamol with good relief. He is still working with . Nila, his  representative, is present via phone call during his visit. He denies recent health changes. He denies recent falls or trauma. He denies new onset fever/night sweats, urinary incontinence, bowel incontinence, significant weight changes, significant motor weakness or changes, or loss of sensations. His pain today is 2/10. With activity 5-6/10.    Interval History 2/3/2025:  Zeeshan Patel returns for follow-up for imaging review.     Nila Zhao, nurse  with  is present during the visit. Cannot climb stairs due to hip pain. PT has not been helpful. He states the sessions have not been challenging and he reports massage and massage gun with limited exercise or strengthening during sessions. He denies recent health changes. He denies recent falls or trauma. He denies new onset fever/night sweats, urinary incontinence, bowel incontinence, significant weight changes, significant motor weakness or changes, or loss of sensations. His pain today is 4/10.  With activity, the pain is 6/10.    Interval History 3/12/2025:  Zeeshan  Rishabh Patel returns to clinic for follow-up after right hip joint and right GTB injection on 2/19/2025. He reports limited pain relief. He continues with right hip/lower back pain with walking and standing too long. Since his last office visit, he had a long trip to [place]. He has had increased pain to the tailbone and his right buttock. The buttock pain is worse with walking. He was recently diagnosed with sleep apnea. He has been having issues with increased blood pressure. He had his initial evaluation with [facility], however, he had increased blood pressure during the session and so they wanted him to be evaluated for his blood pressure and for him to transition to [facility]. He denies recent falls or trauma. He denies new onset fever/night sweats, urinary incontinence, bowel incontinence, significant weight changes, significant motor weakness or changes, or loss of sensations. His pain today is 7/10.      Interval History 4/7/25: Zeeshan Patel returns for follow up. At our last visit Mr. Patel was having ongoing low back pain. Tsering trialed him on Methocarbamol and requested an MRI prior to follow up. He was also referred to ortho for ongoing pain despite GTB and Hip injection. He presents today noting that the MRI was denied and he was not contacted by orthopaedics. Today, he continues to have right low back pain worse with sitting too long or standing too long. With prolonged sitting the pain radiates into the right buttock and posterior right thigh. Pain continues to be 7-9/10 in the right low back.     6 weeks of Conservative therapy:  PT: 8/5/24 to current date (three times a week)  HEP: 3-4 X / weekly      Treatments / Medications: (Ice/Heat/NSAIDS/APAP/etc):  Ibuprofen, methocarbamol       Interventional Pain Procedures: (Previous injections)  11/19/2024 - L5/S1 ILESI (to the right) - 50% relief of back pain x 2 weeks  12/31/2024 - right SI joint and right piriformis - 60%  relief for 3 weeks  2/19/2025 - right hip and right GTB - limited relief for 5 days    Past Medical History:   Diagnosis Date    GERD (gastroesophageal reflux disease)     Ulcer      Past Surgical History:   Procedure Laterality Date    APPENDECTOMY      EPIDURAL STEROID INJECTION N/A 11/19/2024    Procedure: LUMBAR L5/S1 FABY (TO THE RIGHT);  Surgeon: Amanda Lewis MD;  Location: [facility];  Service: Pain Management;  Laterality: N/A;  [phone]  2 WK F/U KELSI    INJECTION OF ANESTHETIC AGENT AROUND NERVE Right 2/19/2025    Procedure: INJECTION, RIGHT HIP AND RIGHT GTB;  Surgeon: Amanda Lewis MD;  Location: [facility];  Service: Pain Management;  Laterality: Right;  2 WK F/U NIKO    INJECTION, SACROILIAC JOINT Right 12/31/2024    Procedure: INJECTION,SACROILIAC JOINT RIGHT AND RIGHT PIRIFORMIS;  Surgeon: Amanda Lewis MD;  Location: [facility] PAIN [facility]GT;  Service: Pain Management;  Laterality: Right;  3 WK F/U EVIE    left shoulder      WRIST ARTHROPLASTY       Social History     Socioeconomic History    Marital status:    Tobacco Use    Smoking status: Every Day    Smokeless tobacco: Never   Substance and Sexual Activity    Alcohol use: No    Drug use: No     Family History   Problem Relation Name Age of Onset    Cancer Mother      Kidney disease Mother      Hypertension Mother      Hypertension Father      Hypertension Maternal Aunt thyroid cancer     Diabetes Maternal Grandfather colon        Review of patient's allergies indicates:  No Known Allergies    Current Outpatient Medications   Medication Sig    hydroCHLOROthiazide 12.5 MG Tab Take 12.5 mg by mouth once daily.    losartan (COZAAR) 50 MG tablet Take 50 mg by mouth once daily.    metFORMIN (GLUCOPHAGE-XR) 500 MG ER 24hr tablet     methocarbamoL (ROBAXIN) 500 MG Tab Take 1 tablet (500 mg total) by mouth 3 (three) times daily as needed (spasm).    pantoprazole (PROTONIX) 40 MG tablet Take 40 mg by mouth every morning.    pravastatin  "(PRAVACHOL) 40 MG tablet Take 40 mg by mouth.    triamcinolone acetonide 0.1% (KENALOG) 0.1 % cream Apply topically 2 (two) times daily.    celecoxib (CELEBREX) 100 MG capsule Take 1 capsule (100 mg total) by mouth daily as needed for Pain. (Patient not taking: Reported on 1/27/2025)    famotidine (PEPCID) 40 MG tablet Take 40 mg by mouth 2 (two) times daily. (Patient not taking: Reported on 4/7/2025)    losartan-hydrochlorothiazide 50-12.5 mg (HYZAAR) 50-12.5 mg per tablet 1 tablet Orally Once a day for 90 days (Patient not taking: Reported on 4/7/2025)    metFORMIN (GLUCOPHAGE-XR) 500 MG ER 24hr tablet 1 tablet with evening meal Orally Once a day for 90 days (Patient not taking: Reported on 3/12/2025)     No current facility-administered medications for this visit.       ROS:  GENERAL: No fever. No chills. No fatigue. Denies weight loss. Denies weight gain.  HEENT: Denies headaches. Denies vision change. Denies eye pain. Denies double vision. Denies ear pain.   CV: Denies chest pain.   PULM: Denies of shortness of breath.  GI: Denies constipation. No diarrhea. No abdominal pain. Denies nausea. Denies vomiting. No blood in stool.  HEME: Denies bleeding problems.  : Denies urgency. No painful urination. No blood in urine.  MS: Denies joint stiffness. Denies joint swelling.   SKIN: Denies rash.   NEURO: Denies seizures. No weakness.  PSYCH:  Denies difficulty sleeping. No anxiety. Denies depression. No suicidal thoughts.       VITALS:   Vitals:    04/07/25 1419   BP: 126/86   Pulse: 99   Resp: 16   Temp: 98 °F (36.7 °C)   TempSrc: Oral   SpO2: 95%   Weight: 105.6 kg (232 lb 12.9 oz)   Height: 5' 4" (1.626 m)   PainSc:   7         PHYSICAL EXAM:   GENERAL: Well appearing, in no acute distress, alert and oriented x3.  PSYCH:  Mood and affect appropriate.  SKIN: Skin color, texture, turgor normal, no rashes or lesions.  HEENT:  Normocephalic, atraumatic. Cranial nerves grossly intact.  NECK: No pain to palpation over " the cervical paraspinous muscles. No pain to palpation over facets. No pain with neck flexion, extension, or lateral flexion.   PULM: No evidence of respiratory difficulty, symmetric chest rise.  GI:  Non-distended  BACK: Good ROM. Pain with extension. Tender to palpation midline. Most pain with right sided facet loading. +Pain over right SIJ, +LIZA, +Compression, +Gaenslen's. Negative FADIR   EXTREMITIES: No deformities, edema, or skin discoloration.   MUSCULOSKELETAL: No atrophy is noted.  NEURO: Sensation is equal and appropriate bilaterally. Bilateral upper and lower extremity strength is normal and symmetric. Plantar response are downgoing.    GAIT: normal.      LABS:  BUN/Cr: 13/0.9    IMAGING:    XR HIP WITH PELVIS WHEN PERFORMED 2 OR 3 VIEWS RIGHT     CLINICAL HISTORY:  R hip pain s/p fall; Pain in right hip     TECHNIQUE:  AP view of the pelvis and frog leg lateral view of the right hip were performed.     COMPARISON:  None     FINDINGS:  No fracture or dislocation.  No regional soft tissue abnormalities.     Incidentally noted is lower lumbar degenerative change.     Impression:     Unremarkable appearance of the right hip.        Electronically signed by:Chet Nuñez  Date:                                            08/20/2024  Time:                                           09:33    7/31/24 --- MRI LUMBAR SPINE     Clinical data: Back pain.     Procedure:  Sagittal and axial, multi-sequence MR images through the lumbar spine were obtained.     Findings:   The alignment, vertebral body heights and marrow signal intensity are normal.   The conus medullaris terminates at the normal level and is normal in signal intensity.        T12/L1/L2: Minimal central disc bulge with no stenosis.   L2/L3: Small right paracentral disc protrusion with no spinal stenosis or neural foramina stenosis.   L3/L4: Diffuse disc bulge with mild to moderate bilateral neural foramina stenosis.   L4/L5: Diffuse disc bulge with mild to  moderate bilateral neural foramina stenosis.   L5/S1: No disc herniation or stenosis.     CT LUMBAR SPINE   Findings:   There is a congenitally narrow canal, on the basis of short pedicles. There are superimposed extradural degenerative changes from L2-3 through L4-5. There is multilevel degenerative disc disease.   The alignment and vertebral body heights are normal.  There is no evidence of fracture or subluxation.  Sagittal coronal reformatted images demonstrate no subluxation, or further findings.     8/20/24 --- XR HIP WITH PELVIS WHEN PERFORMED 2 OR 3 VIEWS RIGHT     CLINICAL HISTORY:  R hip pain s/p fall; Pain in right hip     TECHNIQUE:  AP view of the pelvis and frog leg lateral view of the right hip were performed.     COMPARISON:  None     FINDINGS:  No fracture or dislocation.  No regional soft tissue abnormalities.     Incidentally noted is lower lumbar degenerative change.     Impression:     Unremarkable appearance of the right hip.        ASSESSMENT: 55 y.o. year old male with pain, consistent with:    Encounter Diagnosis   Name Primary?    Lumbosacral spondylosis without myelopathy Yes         DISCUSSION: Zeeshan Patel is a [place]i-speaking man who comes to us after a fall. He was carrying a washing machine up some stairs and fell backwards with the machine landing on top of him. Despite ongoing PT he continues to have back pain radiating to the right leg which is worst with extension and flexion as well as axial loading. Imaging shows L3/4 and 4/L5 bulge with mild to moderate bilateral neural foramina stenosis. Exam shows pain worst with facet loading. We have tried several injections with short-lived relief.     PLAN:  Updated MRI denied by   Referral to Ortho to evaluate continued hip pain - not contacted  Continue HEP  He can continue ibuprofen as needed, cautious given history of gastric ulcer.   Continue Methocarbamol 500 mg TID PRN muscle spasms. Refilled today.  Call [facility]  again now that BP stable   He can continue with light duty for work. FCE may be indicated in the future.   Follow up in 2 months, after [facility] to consider b/l L3-5 LMBB/RFA    The above plan and management options were discussed at length with patient. Patient is in agreement with the above and verbalized understanding.     Amanda Lewis MD   04/07/2025

## 2025-04-09 ENCOUNTER — TELEPHONE (OUTPATIENT)
Dept: PAIN MEDICINE | Facility: CLINIC | Age: 55
End: 2025-04-09
Payer: MEDICAID

## 2025-04-09 DIAGNOSIS — M53.3 SACROILIAC JOINT PAIN: ICD-10-CM

## 2025-04-09 DIAGNOSIS — M47.817 LUMBOSACRAL SPONDYLOSIS WITHOUT MYELOPATHY: Primary | ICD-10-CM

## 2025-04-09 DIAGNOSIS — M16.11 PRIMARY OSTEOARTHRITIS OF RIGHT HIP: ICD-10-CM

## 2025-04-09 DIAGNOSIS — G89.21 CHRONIC PAIN AFTER TRAUMATIC INJURY: Primary | ICD-10-CM

## 2025-04-09 NOTE — TELEPHONE ENCOUNTER
"Saw Dr. Lewis on 04/07/25.     "  ---- Message from Tsering Franks NP sent at 4/3/2025 10:34 AM CDT -----  Regarding: RE: Checkin on MRI  I know. I would still like him to f/u with Dr Lewis. I feel like I've tried as much as I can however, WC keeps denying everything. Change it to just f/u with Joshua, not imaging review. Thanks,Tsering  ----- Message -----  From: Felicitas Montez LPN  Sent: 4/3/2025   9:01 AM CDT  To: Tsering Franks NP  Subject: Checkin on MRI                                    Morning,Looks like his last MRI was denied, per Sticky Note. He is scheduled on Monday, 04/07/25 with Dr. Lewis to follow-up after MRI; however MRI has not occurred. Should I call to see if he still wants to see Joshua or if he wants to pay for new imaging? Maybe have him call [facility]? Just seeing what is best when this situation happens. Thank you,EMIL Montez LPN      "  "

## 2025-04-09 NOTE — TELEPHONE ENCOUNTER
----- Message from Tsering Franks NP sent at 4/3/2025 10:34 AM CDT -----  Regarding: RE: Checkin on MRI  I know. I would still like him to f/u with Dr Lewis. I feel like I've tried as much as I can however, WC keeps denying everything. Change it to just f/u with Joshua, not imaging review. Thanks,Tsering  ----- Message -----  From: Felicitas Montez LPN  Sent: 4/3/2025   9:01 AM CDT  To: Tsering Franks NP  Subject: Checkin on MRI                                   Morning,Looks like his last MRI was denied, per Sticky Note. He is scheduled on Monday, 04/07/25 with Dr. Lewis to follow-up after MRI; however MRI has not occurred. Should I call to see if he still wants to see Joshua or if he wants to pay for new imaging? Maybe have him call [facility]? Just seeing what is best when this situation happens. Thank you,EMIL Montez LPN

## 2025-04-09 NOTE — TELEPHONE ENCOUNTER
----- Message from Mae Hawthorne sent at 4/9/2025 12:15 PM CDT -----  Regarding: Request for [facility] order  Good afternoon Dr. Lewis and Tsering,I assit with Workers Comp therapy cases.Mr. Griffin Patel MRN [id number] informed the clinic that he has been cleared to resume the [facility] Program. He will need a new HB order, as the previously one was only approved for the evaluation (no follow-ups). [facility] will need to submit a new 1010 and order to Workers Comp.Can a new [facility] order be placed please?Thank you in advance,Marine' MalletProgram Coordinator - OTW Occupational Health Program

## 2025-04-10 ENCOUNTER — TELEPHONE (OUTPATIENT)
Dept: ORTHOPEDICS | Facility: CLINIC | Age: 55
End: 2025-04-10
Payer: MEDICAID

## 2025-04-10 NOTE — TELEPHONE ENCOUNTER
Called patient via [facility] [employer] and set up appt with Zurdo vinson worker's comp. Patient agreed to first offered appt    ----- Message from Med Assistant Arambula sent at 4/9/2025  1:23 PM CDT -----  Regarding: FW: New Ortho Worker's Comp appt request    ----- Message -----  From: Shayan Longo LPN  Sent: 4/9/2025  12:54 PM CDT  To: Tyesha Garcias MA  Subject: RE: New Ortho Worker's Comp appt request         This is approved to be seen.Be  ----- Message -----  From: Tyesha Garcias MA  Sent: 4/8/2025   3:47 PM CDT  To: Shayan Longo LPN  Subject: New Ortho Worker's Comp appt request             Rigo Lr,I was given your contact information in regards to getting patient's worker's comp cases approved before seeing a provider. This patient is requesting an appointment with one of our [facility] PAs, Zurdo Chakraborty, and I was wondering what the process was for getting him set up with an appointment? Please let me know.Thanks!Kyara Garcias, MAMedical Assistant to ESTIVEN MeyerCOchsner [phone]  ----- Message -----  From: Tyesha Garcias MA  Sent: 4/7/2025   4:16 PM CDT  To: Tyesha Garcias MA      ----- Message -----  From: Zurdo Chakraborty PA-C  Sent: 4/7/2025   7:34 AM CDT  To: Diogenes Villarreal; Tyesha Garcias MA    Yes, I would be happy to see him!Kyara, would you mind reaching him to get him scheduled to see me in clinic?  ----- Message -----  From: Tyesha Garcias MA  Sent: 4/7/2025   7:10 AM CDT  To: Zurdo Chakraborty PA-C      ----- Message -----  From: Diogenes Villarreal  Sent: 4/4/2025  10:38 AM CDT  To: Chakraborty Zurdo G Staff    Would you see a work comp for their hip OA?  ----- Message -----  From: Shayan Longo LPN  Sent: 4/4/2025  10:35 AM CDT  To: Diogenes Villarreal    Are you able to get this patient scheduled for his hip issue?Reynaldo

## 2025-04-14 ENCOUNTER — OFFICE VISIT (OUTPATIENT)
Dept: ORTHOPEDICS | Facility: CLINIC | Age: 55
End: 2025-04-14
Payer: MEDICAID

## 2025-04-14 DIAGNOSIS — M25.551 CHRONIC RIGHT HIP PAIN: ICD-10-CM

## 2025-04-14 DIAGNOSIS — G89.29 CHRONIC RIGHT HIP PAIN: ICD-10-CM

## 2025-04-14 DIAGNOSIS — M47.816 LUMBAR SPONDYLOSIS: Primary | ICD-10-CM

## 2025-04-14 PROCEDURE — 1159F MED LIST DOCD IN RCRD: CPT | Mod: CPTII,,,

## 2025-04-14 PROCEDURE — 99999 PR PBB SHADOW E&M-EST. PATIENT-LVL III: CPT | Mod: PBBFAC,,,

## 2025-04-14 PROCEDURE — 4010F ACE/ARB THERAPY RXD/TAKEN: CPT | Mod: CPTII,,,

## 2025-04-14 PROCEDURE — 99213 OFFICE O/P EST LOW 20 MIN: CPT | Mod: PBBFAC

## 2025-04-14 PROCEDURE — 1160F RVW MEDS BY RX/DR IN RCRD: CPT | Mod: CPTII,,,

## 2025-04-14 PROCEDURE — 99203 OFFICE O/P NEW LOW 30 MIN: CPT | Mod: S$PBB,,,

## 2025-04-14 RX ORDER — GABAPENTIN 100 MG/1
100 CAPSULE ORAL 3 TIMES DAILY
Qty: 90 CAPSULE | Refills: 0 | Status: SHIPPED | OUTPATIENT
Start: 2025-04-14

## 2025-04-14 NOTE — PROGRESS NOTES
SUBJECTIVE:     History of Present Illness    CHIEF COMPLAINT:  - Right hip pain radiating to the back and down the leg.    HPI:  Mr. Griffin hawkins presents with right hip pain ongoing for approximately 10 months, starting on July 31st of last year after falling backwards while moving up. Pain is located in the back and front of the hip, radiating down the leg to the back of the knee. It has remained constant since the incident, affects his sleep, and he denies any weakness in his legs.    He has undergone three injections administered by Dr. Lewis for pain management.  The injections in chronological order include L5-S1 steroid injection (11/19/2024), right SI and piriformis injection (12/31/2024), and right intra-articular hip and GTB injection to (02/19/2025). The first two injections provided relief for 1-2 weeks, while the most recent hip injection only offered relief for one day. None have provided significant long-term relief.    Currently, he takes 800 mg of Robaxin, which helps with sleep but does not provide significant pain relief during the day. He has not tried Lyrica or Gabapentin.    He was scheduled to participate in a healthy back program at [facility] but was unable to complete it due to high blood pressure. He has since been seen by his primary care physician to stabilize his blood pressure.      PREVIOUS TREATMENTS:  - Mr. Griffin hawkins has received three injections: First two injections provided relief for 1-2 weeks, most recent hip injection offered relief for one day    MEDICATIONS:  - Robaxin: 800 mg, helps with sleep but not much during the day  - Ibuprofen          Past Medical History:   Diagnosis Date    GERD (gastroesophageal reflux disease)     Ulcer        Past Surgical History:   Procedure Laterality Date    APPENDECTOMY      EPIDURAL STEROID INJECTION N/A 11/19/2024    Procedure: LUMBAR L5/S1 FABY (TO THE RIGHT);  Surgeon: Amanda Lewis MD;  Location: [facility] PAIN MGT;   Service: Pain Management;  Laterality: N/A;  [phone]  2 WK F/U KELSI    INJECTION OF ANESTHETIC AGENT AROUND NERVE Right 2/19/2025    Procedure: INJECTION, RIGHT HIP AND RIGHT GTB;  Surgeon: Amanda Lewis MD;  Location: [facility] PAIN [facility]GT;  Service: Pain Management;  Laterality: Right;  2 WK F/U NIKO    INJECTION, SACROILIAC JOINT Right 12/31/2024    Procedure: INJECTION,SACROILIAC JOINT RIGHT AND RIGHT PIRIFORMIS;  Surgeon: Amanda Lewis MD;  Location: [facility] PAIN [facility]GT;  Service: Pain Management;  Laterality: Right;  3 WK F/U EVIE    left shoulder      WRIST ARTHROPLASTY         Family History   Problem Relation Name Age of Onset    Cancer Mother      Kidney disease Mother      Hypertension Mother      Hypertension Father      Hypertension Maternal Aunt thyroid cancer     Diabetes Maternal Grandfather colon        Review of patient's allergies indicates:  No Known Allergies    Current Medications[1]      Review of Systems:  ROS:  The updated medical history is in the chart and has been reviewed. A review of systems is updated and there is no reported vision changes, ear/nose/mouth/throat complaints, chest pain, shortness of breath, abdominal pain, urological complaints, fevers or chills, psychiatric complaints. Musculoskeletal and neurologcial symptoms are as documented. All other systems are negative.      OBJECTIVE:     PHYSICAL EXAM:  There were no vitals taken for this visit.  General: Pleasant, cooperative, NAD.  HEENT: NCAT, sclera nonicteric.  Lungs: Respirations are equal and unlabored.   Abdomen: Soft and non-tender.  CV: 2+ bilateral upper and lower extremity pulses.  Neuro: Sensation intact to light touch.  Skin: Intact throughout LE with no rashes, erythema, or lesions.  Extremities: No LE edema, NVI lower extremities. antalgic gait.      right Hip Exam:  TTP:  Mild anterior  90 degrees flexion  10 degrees extension   10 degrees internal rotation  30 degrees external rotation  30 degrees  abduction  20 degrees adduction   0 flexion contracture   negative LIZA   Mildly positive FADIR  negative [facility]    Back Exam  Tenderness: L5-S1 and right SI joint   Swelling:  None       Range of Motion:      Flexion: 50     Extension: 10     Lateral Bend:   Right 10                              Left 10     Rotation:          Right 5                              Left 5       SLR:                  Right Positive                             Left Negative       Muscle Strength      Hip Flexion 5/5     Hip Extension 5/5     Abductor: 5/5     Adductor: 5/5     Quadriceps: 5/5     Hamstrings: 5/5     Gastrocnemius: 5/5     Anterior tibialis: 5/5       Reflexes     Patellar: Normal    Achilles: Normal       Sensation: Normal         RADIOGRAPHS:  X-rays of the right hip taken on 08/20/2024 personally reviewed. Imaging reveals well-maintained joint space with no acute fracture dislocation.    ASSESSMENT:       ICD-10-CM ICD-9-CM   1. Lumbar spondylosis  M47.816 721.3   2. Chronic right hip pain  M25.551 719.45    G89.29 338.29       PLAN:     We discussed with the patient at length all the different treatment options available including anti-inflammatories, acetaminophen, rest, ice, lower extremity strengthening exercise, occasional cortisone injections for temporary relief, and finally surgical intervention.      MEDICATIONS:  - Started gabapentin 100 mg 3 times daily.  - Continue ibuprofen as needed.  - Continue methocarbamol.    REFERRALS:  - Referred to back and spine specialist for lower back pain.    FOLLOW UP:  - Follow up with back and spine specialist in 1 month.          This note was generated with the assistance of ambient listening technology. Verbal consent was obtained by the patient and accompanying visitor(s) for the recording of patient appointment to facilitate this note. I attest to having reviewed and edited the generated note for accuracy, though some syntax or spelling errors may persist.  Please contact the author of this note for any clarification.      Zurdo Chakraborty Jr., PA-C          [1]   Current Outpatient Medications:     gabapentin (NEURONTIN) 100 MG capsule, Take 1 capsule (100 mg total) by mouth 3 (three) times daily., Disp: 90 capsule, Rfl: 0    hydroCHLOROthiazide 12.5 MG Tab, Take 12.5 mg by mouth once daily., Disp: , Rfl:     losartan (COZAAR) 50 MG tablet, Take 50 mg by mouth once daily., Disp: , Rfl:     metFORMIN (GLUCOPHAGE-XR) 500 MG ER 24hr tablet, 1 tablet with evening meal Orally Once a day for 90 days (Patient not taking: Reported on 3/12/2025), Disp: , Rfl:     methocarbamoL (ROBAXIN) 500 MG Tab, Take 1 tablet (500 mg total) by mouth 3 (three) times daily as needed (spasm)., Disp: 30 tablet, Rfl: 2    pravastatin (PRAVACHOL) 40 MG tablet, Take 40 mg by mouth., Disp: , Rfl:

## 2025-04-21 ENCOUNTER — CLINICAL SUPPORT (OUTPATIENT)
Dept: REHABILITATION | Facility: HOSPITAL | Age: 55
End: 2025-04-21
Payer: COMMERCIAL

## 2025-04-21 DIAGNOSIS — M53.3 SACROILIAC JOINT PAIN: ICD-10-CM

## 2025-04-21 DIAGNOSIS — M16.11 PRIMARY OSTEOARTHRITIS OF RIGHT HIP: ICD-10-CM

## 2025-04-21 DIAGNOSIS — M47.817 LUMBOSACRAL SPONDYLOSIS WITHOUT MYELOPATHY: ICD-10-CM

## 2025-04-21 PROCEDURE — 97750 PHYSICAL PERFORMANCE TEST: CPT

## 2025-04-21 PROCEDURE — 97110 THERAPEUTIC EXERCISES: CPT

## 2025-04-21 NOTE — PROGRESS NOTES
Outpatient Rehab    Physical Therapy Evaluation    Patient Name: Zeeshan Patel  MRN: [id number]  YOB: 1970  Encounter Date: 4/21/2025    Therapy Diagnosis:   Encounter Diagnoses   Name Primary?    Lumbosacral spondylosis without myelopathy     Primary osteoarthritis of right hip     Sacroiliac joint pain      Physician: Tsering Franks NP    Physician Orders: Eval and Treat  Medical Diagnosis: Lumbosacral spondylosis without myelopathy  Primary osteoarthritis of right hip  Sacroiliac joint pain    Visit # / Visits Authorized:  1 / 20  Insurance Authorization Period: 4/14/2025 to 6/27/2025  Date of Evaluation: 4/21/2025  Plan of Care Certification: 4/21/2025 to 7/21/25     Time In:   9  Time Out:  10  Total Time:   60  Total Billable Time:  55    Intake Outcome Measure for FOTO Survey    Therapist reviewed FOTO scores for Zeeshan Patel on 4/21/2025.   FOTO report - see Media section or FOTO account episode details.     Intake Score: 38%         Subjective   History of Present Illness  Zeeshan is a 55 y.o. male who reports to physical therapy with a chief concern of LBP.         Diagnostic tests related to this condition: MRI studies.   MRI Studies Details: MRI LUMBAR SPINE     Clinical data: Back pain.     Procedure:  Sagittal and axial, multi-sequence MR images through the lumbar spine were obtained.     Findings:   The alignment, vertebral body heights and marrow signal intensity are normal.   The conus medullaris terminates at the normal level and is normal in signal intensity.        T12/L1/L2: Minimal central disc bulge with no stenosis.   L2/L3: Small right paracentral disc protrusion with no spinal stenosis or neural foramina stenosis.   L3/L4: Diffuse disc bulge with mild to moderate bilateral neural foramina stenosis.   L4/L5: Diffuse disc bulge with mild to moderate bilateral neural foramina stenosis.   L5/S1: No disc herniation or stenosis.  Exam End: 07/31/24 17:37          Pain     Patient reports a current pain level of 8/10.        Pt reports he did some walking yesterday which increased pain    Patient reports pain is sharp and radiates from RLB around to anterior hip  Pt reports his BP is now under control and he is able to participate.  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    Zeeshan Patel  has a past surgical history that includes Appendectomy; left shoulder; Wrist Arthroplasty; Epidural steroid injection (N/A, 11/19/2024); injection, sacroiliac joint (Right, 12/31/2024); and Injection of anesthetic agent around nerve (Right, 2/19/2025).    Zeeshan has a current medication list which includes the following prescription(s): gabapentin, hydrochlorothiazide, losartan, metformin, methocarbamol, and pravastatin.    Review of patient's allergies indicates:  No Known Allergies     Objective          Isometric Testing on Med X equipment: Testing administered by PT    Test Initial Baseline Midpoint Final   Date 4/21/25     ROM 9-30 deg     Max Peak Torque 74      Min Peak Torque 47      Flex/Ext Ratio 1.5     % variance  normative data 72     % change from initial test N/A visit 1         REPEATED TEST MOVEMENTS:    Baseline symptoms:  Repeated Flexion in Standing worse   Repeated Extension in Standing worse   Repeated Flexion in lying worse   Repeated Extension in lying  worse     Outcomes:  Intake Score: 38%  Visit 6 Score:   Visit 10 Score:   Discharge Score:  Goal Score: 55%     Treatment:   Medical MedX Treatment as follows:  Time: 15  MedX testing performed:  Patient  received neuromuscular education to engage spinal musculature correctly for motor control and engagement of musculature iincluding the MedX exercise component and practice and standard testing. MedX dynamic exercise and baseline isometric test performed with instructions to guide the patient safely through the testing procedure. Patient instructed to perform isometric test correctly and safely while building to an optimal force with a pain-free effort. Patient also instructed that they should feel support/pressure from MedX restraints but no pain/discomfort, and encouraged to report any pain to therapist. Patient demonstrated appropriate understanding of information and tolerance of test.  Education regarding purpose of test, safety during test given, and reviewed possible more soreness and strategies.           4/21/2025     9:37 AM   [facility]   Lumbar Extension Seat Pad 1   Femur Restraint 6   Top Dead Center 24   Counterweight 375   Lumbar Flexion 30   Lumbar Extension 9   Lumbar Peak Torque 74 ft. lbs.   Min Torque 47   Test Percent Below Normative Data 72 %   Ice - Z Lie (in min.) 5         Patient participated in therapeutic exercises to develop strength, endurance, ROM, flexibility, posture, and core stabilization for 45 minutes including:    LTR x 10  DKTC x 10 c/ swissball  HSS c/ strap 3x10 sec  PPT x 10  Bridge 10x  Swissball flexion 5x fwd/side to side  ER stretch for hip x 5 in fig 4 with increased pain  Peripheral muscle strengthening which included one  set of 15-20 repetitions at a slow and controlled 10-13 second per rep pace focused on strengthening supporting musculature in order to improve body mechanics and functional mobility. Patient and therapist focused on proper form during treatment to ensure optimal strengthening of each targeted muscle group.  Machines utilized included:  torso rotation/hip abd/add/knee extension.          Time Entry(in minutes):  Physical Performance  Test (with Report) Time Entry: 15  Therapeutic Exercise Time Entry: 45    Assessment & Plan    Pt arrives today and reassessed . Last evaluation was on Feb 27,2025 however BP was high and not controlled at the time. BP is now under control and pt reports he takes it daily. Reviewed HEP and goals of HB program with patient. Added HSS/bridging and seated trunk flexion. No increase pain with added stretches. He was tested on Med X and compared to other men in his age group. Pt is 72% below his age norms in strength. Pt also went through 1/2 of the peripheral machines without difficulty, except torso rotation to the right was painful and stopped at rep 8. Continue to reassess next visit.     Patient's spiritual, cultural, and educational needs considered and patient agreeable to plan of care and goals.     Education             Discussed gradually increasing walking distance and to stretch before and after walking        GOALS: Pt is in agreement with the following goals.    Short term goals:  6 weeks or 10 visits   - Pt will demonstrate increased lumbar MedX ROM by at least 3 degrees from the initial ROM value with improvements noted in functional ROM and ability to perform ADLs. Appropriate and Ongoing  - Pt will demonstrate increased MedX average isometric strength value by 20% from initial test resulting in improved ability to perform bending, lifting, and carrying activities safely, confidently. Appropriate and Ongoing  - Pt will report a reduction in worst pain score by 1-2 points for improved tolerance for walking. Appropriate and Ongoing  - Pt able to perform HEP correctly with minimal cueing or supervision from therapist to encourage independent management of symptoms. Appropriate and Ongoing    Long term goals: 10 weeks or 20 visits   - Pt will demonstrate increased lumbar MedX ROM by at least 9 degrees from initial ROM value, resulting in improved ability to perform functional forward bending while standing  and sitting. Appropriate and Ongoing  - Pt will demonstrate increased MedX average isometric strength value by 40% from initial test resulting in improved ability to perform bending, lifting, and carrying activities safely and confidently. Appropriate and Ongoing  - Pt to demonstrate ability to independently control and reduce their pain through posture positioning and mechanical movements throughout a typical day. Appropriate and Ongoing  - Pt will demonstrate reduced pain and improved functional outcomes as reported on the FOTO by reaching an intake score of >/= 55% functional ability in order to demonstrate subjective improvement in patient's condition. . Appropriate and Ongoing  - Pt will demonstrate independence with the HEP at discharge. Appropriate and Ongoing      Grisel Nieto, PT    4/21/2025

## 2025-04-22 NOTE — PROGRESS NOTES
Outpatient Rehab    Physical Therapy Evaluation    Patient Name: Zeeshan Patel  MRN: [id number]  YOB: 1970  Encounter Date: 4/24/2025    Therapy Diagnosis:   Encounter Diagnosis   Name Primary?    Decreased range of motion of lumbar spine Yes     Physician: Tsering Franks NP    Physician Orders: Eval and Treat  Medical Diagnosis: Lumbosacral spondylosis without myelopathy  Primary osteoarthritis of right hip  Sacroiliac joint pain    Visit # / Visits Authorized:  3/20  Insurance Authorization Period: 4/14/2025 to 6/27/2025  Date of Evaluation: 4/24/2025  Plan of Care Certification: 4/24/2025 to 7/21/25     Time In: 0330   Time Out: 0425  Total Time: 55   Total Billable Time: 55    Intake Outcome Measure for FOTO Survey    Therapist reviewed FOTO scores for Zeeshan Patel on 4/24/2025.   FOTO report - see Media section or FOTO account episode details.     Intake Score:  %         Subjective  Patient reports chronic lower back pain/stiffness along with intermittent (R) hip/thigh discomfort. Pain level is moderate       Pain     Patient reports a current pain level of 6/10.               Patient reports pain is sharp and radiates from RLB around to anterior hip  Pt reports his BP is now under control and he is able to participate.  Prior Therapy: yes  Prior Treatment: injection, physical therapy   Social History:  lives with their spouse  Occupation:  for [employer]  Leisure: [employer]      Prior Level of Function: independent  Current Level of Function: independent  DME owned/used: none  Gym Membership: no  Past Medical History/Physical Systems Review:   Zeeshan Patel  has a past medical history of GERD (gastroesophageal reflux disease) and Ulcer.    Zeeshan Patel  has a past surgical history that includes Appendectomy; left shoulder; Wrist Arthroplasty; Epidural steroid injection (N/A, 11/19/2024); injection, sacroiliac joint (Right, 12/31/2024);  and Injection of anesthetic agent around nerve (Right, 2/19/2025).    Zeeshan has a current medication list which includes the following prescription(s): gabapentin, hydrochlorothiazide, losartan, metformin, methocarbamol, and pravastatin.    Review of patient's allergies indicates:  No Known Allergies     Objective          Isometric Testing on Med X equipment: Testing administered by PT    Test Initial Baseline Midpoint Final   Date 4/21/25     ROM 9-30 deg     Max Peak Torque 74      Min Peak Torque 47      Flex/Ext Ratio 1.5     % variance  normative data 72     % change from initial test N/A visit 1         REPEATED TEST MOVEMENTS:    Baseline symptoms:  Repeated Flexion in Standing worse   Repeated Extension in Standing worse   Repeated Flexion in lying worse   Repeated Extension in lying  worse     Outcomes:  Intake Score: 38%  Visit 6 Score:   Visit 10 Score:   Discharge Score:  Goal Score: 55%     Treatment:     Zeeshan received neuromuscular education  to isolate and engage spinal stabilization musculature correctly for motor control and coordination to aid in function and posture for 10 minutes on the Medical Medx Machine.  Patient performed MedX dynamic exercise with emphasis on spinal muscular control using pacer throughout  active range of motion. Therapist assisted patient in achieving optimal exertion for neural reeducation and endurance training by using the  Negrito Exertion Rating scale, by instructing the patient to aim for mid range of exertion, performing 15-20 repetitions, slowly, correctly,and safely.          4/24/2025     3:41 PM   [facility] - Short   Visit Number 3   VAS Pain Rating 6   Treadmill Time (in min.) 5 min   Lumbar Stretches - Slouch 10   Flexion in Lying 10   Lumbar Weight 40 lbs   Repetitions 15   Rating of Perceived Exertion 4      Patient participated in therapeutic exercises to develop strength, endurance, ROM, flexibility, posture, and core stabilization for 45  minutes including:    LTR x 10  DKTC x 10 c/ swissball  +Piriformis stretch 2 x 20 sec  ER stretch for hip 3 x 10 sec  HSS c/ strap 2x 20 sec sec  + Supine sciatic nerve glide R LE x 10  +(R)LE long axis distraction  PPT x 10  Bridge + RTB 15x  Swissball flexion 5x fwd/side to side      Peripheral muscle strengthening which included one  set of 15-20 repetitions at a slow and controlled 10-13 second per rep pace focused on strengthening supporting musculature in order to improve body mechanics and functional mobility. Patient and therapist focused on proper form during treatment to ensure optimal strengthening of each targeted muscle group.  Machines utilized included:  torso rotation/hip abd/add/knee extension.          Time Entry(in minutes):  Neuromuscular Re-Education Time Entry: 10  Therapeutic Exercise Time Entry: 45    Assessment & Plan  Zeeshan returns returns reporting chronic lower back and intermittent R hip/thigh discomfort/stiffness. Treatment continued with flexibility, strengthening and neuromuscular reeducation exs. Progressions/additions included piriformis stretch, SOC stretch, Sciatic nerve glide, increased reps for bridging with band. He was able to perform ex's with min cues and without increased pain.Also added R LE long axis distraction which provides some relief. Lumbar MedX resistance was initiated at 40 ft/lbs and he completed 15 reps with a RPE =4 /10 (Min cues initially for pacing). He completed peripheral strengthening ex's without c/o. Will continue per HB protocol and patient tolerance.         Plan: Continue PT towards established goals and plan of care    Patient's spiritual, cultural, and educational needs considered and patient agreeable to plan of care and goals.            GOALS: Pt is in agreement with the following goals.    Short term goals:  6 weeks or 10 visits   - Pt will demonstrate increased lumbar MedX ROM by at least 3 degrees from the initial ROM value with improvements  noted in functional ROM and ability to perform ADLs. Appropriate and Ongoing  - Pt will demonstrate increased MedX average isometric strength value by 20% from initial test resulting in improved ability to perform bending, lifting, and carrying activities safely, confidently. Appropriate and Ongoing  - Pt will report a reduction in worst pain score by 1-2 points for improved tolerance for walking. Appropriate and Ongoing  - Pt able to perform HEP correctly with minimal cueing or supervision from therapist to encourage independent management of symptoms. Appropriate and Ongoing    Long term goals: 10 weeks or 20 visits   - Pt will demonstrate increased lumbar MedX ROM by at least 9 degrees from initial ROM value, resulting in improved ability to perform functional forward bending while standing and sitting. Appropriate and Ongoing  - Pt will demonstrate increased MedX average isometric strength value by 40% from initial test resulting in improved ability to perform bending, lifting, and carrying activities safely and confidently. Appropriate and Ongoing  - Pt to demonstrate ability to independently control and reduce their pain through posture positioning and mechanical movements throughout a typical day. Appropriate and Ongoing  - Pt will demonstrate reduced pain and improved functional outcomes as reported on the FOTO by reaching an intake score of >/= 55% functional ability in order to demonstrate subjective improvement in patient's condition. . Appropriate and Ongoing  - Pt will demonstrate independence with the HEP at discharge. Appropriate and Ongoing      Zaheer Bob, PTA    4/24/2025

## 2025-04-24 ENCOUNTER — CLINICAL SUPPORT (OUTPATIENT)
Dept: REHABILITATION | Facility: HOSPITAL | Age: 55
End: 2025-04-24
Payer: COMMERCIAL

## 2025-04-24 DIAGNOSIS — M53.86 DECREASED RANGE OF MOTION OF LUMBAR SPINE: Primary | ICD-10-CM

## 2025-04-24 PROCEDURE — 97110 THERAPEUTIC EXERCISES: CPT | Mod: CQ

## 2025-04-24 PROCEDURE — 97112 NEUROMUSCULAR REEDUCATION: CPT | Mod: CQ

## 2025-04-29 ENCOUNTER — CLINICAL SUPPORT (OUTPATIENT)
Dept: REHABILITATION | Facility: HOSPITAL | Age: 55
End: 2025-04-29
Payer: COMMERCIAL

## 2025-04-29 DIAGNOSIS — M53.86 DECREASED RANGE OF MOTION OF LUMBAR SPINE: Primary | ICD-10-CM

## 2025-04-29 PROCEDURE — 97110 THERAPEUTIC EXERCISES: CPT | Mod: CQ

## 2025-04-29 PROCEDURE — 97112 NEUROMUSCULAR REEDUCATION: CPT | Mod: CQ

## 2025-04-29 NOTE — PROGRESS NOTES
Outpatient Rehab    Physical Therapy Evaluation    Patient Name: Zeeshan Patel  MRN: [id number]  YOB: 1970  Encounter Date: 4/29/2025    Therapy Diagnosis:   Encounter Diagnosis   Name Primary?    Decreased range of motion of lumbar spine Yes     Physician: Tsering Franks NP    Physician Orders: Eval and Treat  Medical Diagnosis: Lumbosacral spondylosis without myelopathy  Primary osteoarthritis of right hip  Sacroiliac joint pain    Visit # / Visits Authorized:  3/20  Insurance Authorization Period: 4/14/2025 to 6/27/2025  Date of Evaluation: 4/29/2025  Plan of Care Certification: 4/29/2025 to 7/21/25     Time In: 0855   Time Out: 0955  Total Time: 60   Total Billable Time: 55    Intake Outcome Measure for FOTO Survey    Therapist reviewed FOTO scores for Zeeshan Patel on 4/29/2025.   FOTO report - see Media section or FOTO account episode details.     Intake Score:  %         Subjective  Patient reports chronic lower back pain/stiffness along with intermittent (R) hip/thigh discomfort. Pain level is moderate. States that he felt OK after his first full treatment session.            Patient reports pain is sharp and radiates from RLB around to anterior hip  Pt reports his BP is now under control and he is able to participate.  Prior Therapy: yes  Prior Treatment: injection, physical therapy   Social History:  lives with their spouse  Occupation:  for [employer]  Leisure: [employer]      Prior Level of Function: independent  Current Level of Function: independent  DME owned/used: none  Gym Membership: no  Past Medical History/Physical Systems Review:   Zeeshan Patel  has a past medical history of GERD (gastroesophageal reflux disease) and Ulcer.    Zeeshan Patel  has a past surgical history that includes Appendectomy; left shoulder; Wrist Arthroplasty; Epidural steroid injection (N/A, 11/19/2024); injection, sacroiliac joint (Right, 12/31/2024);  and Injection of anesthetic agent around nerve (Right, 2/19/2025).    Zeeshan has a current medication list which includes the following prescription(s): gabapentin, hydrochlorothiazide, losartan, metformin, methocarbamol, and pravastatin.    Review of patient's allergies indicates:  No Known Allergies     Objective          Isometric Testing on Med X equipment: Testing administered by PT    Test Initial Baseline Midpoint Final   Date 4/21/25     ROM 9-30 deg     Max Peak Torque 74      Min Peak Torque 47      Flex/Ext Ratio 1.5     % variance  normative data 72     % change from initial test N/A visit 1         REPEATED TEST MOVEMENTS:    Baseline symptoms:  Repeated Flexion in Standing worse   Repeated Extension in Standing worse   Repeated Flexion in lying worse   Repeated Extension in lying  worse     Outcomes:  Intake Score: 38%  Visit 6 Score:   Visit 10 Score:   Discharge Score:  Goal Score: 55%     Treatment:     Zeeshan received neuromuscular education  to isolate and engage spinal stabilization musculature correctly for motor control and coordination to aid in function and posture for 10 minutes on the Medical Medx Machine.  Patient performed MedX dynamic exercise with emphasis on spinal muscular control using pacer throughout  active range of motion. Therapist assisted patient in achieving optimal exertion for neural reeducation and endurance training by using the  Negrito Exertion Rating scale, by instructing the patient to aim for mid range of exertion, performing 15-20 repetitions, slowly, correctly,and safely.          4/29/2025     9:15 AM   [facility] - Short   Visit Number 4   VAS Pain Rating 4   Time 5   Lumbar Stretches - Slouch 10   Flexion in Lying 10   Flexion in Sitting 15   Lumbar Flexion 33   Lumbar Extension 9   Lumbar Weight 40 lbs   Repetitions 20   Rating of Perceived Exertion 4       Patient participated in therapeutic exercises to develop strength, endurance, ROM, flexibility,  posture, and core stabilization for 45 minutes including:    LTR x 10  DKTC x 10 c/ swissball  Piriformis stretch 2 x 20 sec  ER stretch for hip 3 x 10 sec  HSS c/ strap 2x 20 sec sec  Supine sciatic nerve glide R LE x 10  R)LE long axis distraction  +Passive R QL stretch with therapist assist  PPT x 10  Bridge + RTB 15x  +BKFO RTB x 10  Swissball flexion 5x fwd/side to side  +Paloff press 15# x 15      Peripheral muscle strengthening which included one  set of 15-20 repetitions at a slow and controlled 10-13 second per rep pace focused on strengthening supporting musculature in order to improve body mechanics and functional mobility. Patient and therapist focused on proper form during treatment to ensure optimal strengthening of each targeted muscle group.  Machines utilized included:  torso rotation/hip abd/add/knee extension. Leg ext, leg curl bicep curl tricep ext, rows and chest press      Time Entry(in minutes):  Neuromuscular Re-Education Time Entry: 10  Therapeutic Exercise Time Entry: 45    Assessment & Plan  Zeeshan returns returns reporting chronic lower back and intermittent R hip/thigh discomfort/stiffness which is rated as moderate = 4/10 today..Treatment continued with flexibility, strengthening and neuromuscular reeducation exs. Progressions/additions included addition of BKFO and Paloff press ex. Slight increase in discomfort initially with bridging that diminished with reps. Continued with R LE long axis distraction which provides some relief and also added passive R QL stretching. Lumbar MedX Flexion ROM was increased to 33 degrees, resistance was maintained at 40 ft/lbs and he completed 20 reps with a RPE = 4/10 (Min cues initially for pacing). He completed peripheral strengthening ex's without c/o. Will continue per HB protocol and patient tolerance. He reports feeling better with diminished pain post session.        Plan: Continue PT towards established goals and plan of care    Patient's  spiritual, cultural, and educational needs considered and patient agreeable to plan of care and goals.            GOALS: Pt is in agreement with the following goals.    Short term goals:  6 weeks or 10 visits   - Pt will demonstrate increased lumbar MedX ROM by at least 3 degrees from the initial ROM value with improvements noted in functional ROM and ability to perform ADLs. Appropriate and Ongoing  - Pt will demonstrate increased MedX average isometric strength value by 20% from initial test resulting in improved ability to perform bending, lifting, and carrying activities safely, confidently. Appropriate and Ongoing  - Pt will report a reduction in worst pain score by 1-2 points for improved tolerance for walking. Appropriate and Ongoing  - Pt able to perform HEP correctly with minimal cueing or supervision from therapist to encourage independent management of symptoms. Appropriate and Ongoing    Long term goals: 10 weeks or 20 visits   - Pt will demonstrate increased lumbar MedX ROM by at least 9 degrees from initial ROM value, resulting in improved ability to perform functional forward bending while standing and sitting. Appropriate and Ongoing  - Pt will demonstrate increased MedX average isometric strength value by 40% from initial test resulting in improved ability to perform bending, lifting, and carrying activities safely and confidently. Appropriate and Ongoing  - Pt to demonstrate ability to independently control and reduce their pain through posture positioning and mechanical movements throughout a typical day. Appropriate and Ongoing  - Pt will demonstrate reduced pain and improved functional outcomes as reported on the FOTO by reaching an intake score of >/= 55% functional ability in order to demonstrate subjective improvement in patient's condition. . Appropriate and Ongoing  - Pt will demonstrate independence with the HEP at discharge. Appropriate and Ongoing      Zaheer Bob  PTA    4/29/2025

## 2025-05-01 ENCOUNTER — CLINICAL SUPPORT (OUTPATIENT)
Dept: REHABILITATION | Facility: HOSPITAL | Age: 55
End: 2025-05-01
Payer: COMMERCIAL

## 2025-05-01 DIAGNOSIS — M53.86 DECREASED RANGE OF MOTION OF LUMBAR SPINE: Primary | ICD-10-CM

## 2025-05-01 PROCEDURE — 97110 THERAPEUTIC EXERCISES: CPT

## 2025-05-01 PROCEDURE — 97112 NEUROMUSCULAR REEDUCATION: CPT

## 2025-05-01 NOTE — PROGRESS NOTES
Outpatient Rehab    Physical Therapy Evaluation    Patient Name: Zeeshan Patel  MRN: [id number]  YOB: 1970  Encounter Date: 5/1/2025    Therapy Diagnosis:   Encounter Diagnosis   Name Primary?    Decreased range of motion of lumbar spine Yes     Physician: Tsering Franks NP    Physician Orders: Eval and Treat  Medical Diagnosis: Lumbosacral spondylosis without myelopathy  Primary osteoarthritis of right hip  Sacroiliac joint pain    Visit # / Visits Authorized:  5/20  Insurance Authorization Period: 4/14/2025 to 6/27/2025  Date of Evaluation: 5/1/2025  Plan of Care Certification: 5/1/2025 to 7/21/25     Time In:   850  Time Out:  950  Total Time:   60  Total Billable Time:  55    Intake Outcome Measure for FOTO Survey    Therapist reviewed FOTO scores for Zeeshan Patel on 5/1/2025.   FOTO report - see Media section or FOTO account episode details.     Intake Score: 38%         Subjective  Patient reports chronic lower back pain/stiffness along with intermittent (R) hip/thigh discomfort. Pain level is moderate. States that he felt OK after his first full treatment session.       Pain     Patient reports a current pain level of 4/10.                 Patient reports pain is sharp and radiates from RLB around to anterior hip  Pt reports his BP is now under control and he is able to participate.  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    Zeeshan Patel  has a past surgical history that includes Appendectomy; left shoulder; Wrist Arthroplasty; Epidural steroid injection  (N/A, 11/19/2024); injection, sacroiliac joint (Right, 12/31/2024); and Injection of anesthetic agent around nerve (Right, 2/19/2025).    Zeeshan has a current medication list which includes the following prescription(s): gabapentin, hydrochlorothiazide, losartan, metformin, methocarbamol, and pravastatin.    Review of patient's allergies indicates:  No Known Allergies     Objective          Isometric Testing on Med X equipment: Testing administered by PT    Test Initial Baseline Midpoint Final   Date 4/21/25     ROM 9-30 deg     Max Peak Torque 74      Min Peak Torque 47      Flex/Ext Ratio 1.5     % variance  normative data 72     % change from initial test N/A visit 1         REPEATED TEST MOVEMENTS:    Baseline symptoms:  Repeated Flexion in Standing worse   Repeated Extension in Standing worse   Repeated Flexion in lying worse   Repeated Extension in lying  worse     Outcomes:  Intake Score: 38%  Visit 6 Score:   Visit 10 Score:   Discharge Score:  Goal Score: 55%     Treatment:     Zeeshan received neuromuscular education  to isolate and engage spinal stabilization musculature correctly for motor control and coordination to aid in function and posture for 10 minutes on the Medical [employer] Machine.  Patient performed MedX dynamic exercise with emphasis on spinal muscular control using pacer throughout  active range of motion. Therapist assisted patient in achieving optimal exertion for neural reeducation and endurance training by using the  Negrito Exertion Rating scale, by instructing the patient to aim for mid range of exertion, performing 15-20 repetitions, slowly, correctly,and safely.          5/1/2025     9:18 AM   [facility]   Visit Number 5   VAS Pain Rating 4   Time 5   Lumbar Stretches - Slouch Overcorrection 10   Flexion in Lying 10   Flexion in Sitting 15   Lumbar Weight 44 lbs   Repetitions 15   Rating of Perceived Exertion 4   Ice - Z Lie (in min.) 5       Patient participated in therapeutic  exercises to develop strength, endurance, ROM, flexibility, posture, and core stabilization for 50 minutes including:    LTR x 10  DKTC x 10 c/ swissball  Piriformis stretch 2 x 20 sec  ER stretch for hip 3 x 10 sec  HSS c/ strap 2x 20 sec sec  Supine sciatic nerve glide R LE x 10  R)LE long axis distraction  +Passive R QL stretch with therapist assist  PPT x 10  Bridge + GTB15x  +BKFO GTB x 10  Swissball flexion 5x fwd/side to side  +Paloff press 15# x 15      Peripheral muscle strengthening which included one  set of 15-20 repetitions at a slow and controlled 10-13 second per rep pace focused on strengthening supporting musculature in order to improve body mechanics and functional mobility. Patient and therapist focused on proper form during treatment to ensure optimal strengthening of each targeted muscle group.  Machines utilized included:  torso rotation/hip abd/add/knee extension. Leg ext, leg curl bicep curl tricep ext, rows and chest press      Time Entry(in minutes):  Neuromuscular Re-Education Time Entry: 10  Therapeutic Exercise Time Entry: 50    Assessment & Plan  Zeeshan returns returns reporting chronic lower back and intermittent R hip/thigh discomfort/stiffness which is rated as moderate = 4/10 today.Treatment continued with flexibility, strengthening and neuromuscular reeducation exs.  Progressions included adding resistance for BKFO and bridges. .  stretching. Lumbar MedX resistance increased to 44ft/lbs  with 4/10 exertion level.(Min cues initially for pacing).  Attemptt increase in ROM again next visit.He completed peripheral strengthening ex's without c/o. Will continue per HB protocol and patient tolerance. He reports feeling better with diminished pain post session.        Plan: Continue PT towards established goals and plan of care    Patient's spiritual, cultural, and educational needs considered and patient agreeable to plan of care and goals.     Education             Reviewed visit 5  cardio hand out with patient.  Pt reports he does 2 miles 3x/wk        GOALS: Pt is in agreement with the following goals.    Short term goals:  6 weeks or 10 visits   - Pt will demonstrate increased lumbar MedX ROM by at least 3 degrees from the initial ROM value with improvements noted in functional ROM and ability to perform ADLs. Appropriate and Ongoing  - Pt will demonstrate increased MedX average isometric strength value by 20% from initial test resulting in improved ability to perform bending, lifting, and carrying activities safely, confidently. Appropriate and Ongoing  - Pt will report a reduction in worst pain score by 1-2 points for improved tolerance for walking. Appropriate and Ongoing  - Pt able to perform HEP correctly with minimal cueing or supervision from therapist to encourage independent management of symptoms. Appropriate and Ongoing    Long term goals: 10 weeks or 20 visits   - Pt will demonstrate increased lumbar MedX ROM by at least 9 degrees from initial ROM value, resulting in improved ability to perform functional forward bending while standing and sitting. Appropriate and Ongoing  - Pt will demonstrate increased MedX average isometric strength value by 40% from initial test resulting in improved ability to perform bending, lifting, and carrying activities safely and confidently. Appropriate and Ongoing  - Pt to demonstrate ability to independently control and reduce their pain through posture positioning and mechanical movements throughout a typical day. Appropriate and Ongoing  - Pt will demonstrate reduced pain and improved functional outcomes as reported on the FOTO by reaching an intake score of >/= 55% functional ability in order to demonstrate subjective improvement in patient's condition. . Appropriate and Ongoing  - Pt will demonstrate independence with the HEP at discharge. Appropriate and Ongoing      Grisel Nieto, PT    5/1/2025

## 2025-05-06 ENCOUNTER — CLINICAL SUPPORT (OUTPATIENT)
Dept: REHABILITATION | Facility: HOSPITAL | Age: 55
End: 2025-05-06
Payer: COMMERCIAL

## 2025-05-06 DIAGNOSIS — M53.86 DECREASED RANGE OF MOTION OF LUMBAR SPINE: Primary | ICD-10-CM

## 2025-05-06 PROCEDURE — 97112 NEUROMUSCULAR REEDUCATION: CPT | Mod: CQ

## 2025-05-06 PROCEDURE — 97110 THERAPEUTIC EXERCISES: CPT | Mod: CQ

## 2025-05-06 NOTE — PROGRESS NOTES
Outpatient Rehab    Physical Therapy Evaluation    Patient Name: Zeeshan Patel  MRN: [id number]  YOB: 1970  Encounter Date: 5/6/2025    Therapy Diagnosis:   Encounter Diagnosis   Name Primary?    Decreased range of motion of lumbar spine Yes     Physician: Tsering Franks NP    Physician Orders: Eval and Treat  Medical Diagnosis: Lumbosacral spondylosis without myelopathy  Primary osteoarthritis of right hip  Sacroiliac joint pain    Visit # / Visits Authorized:  6/20  Insurance Authorization Period: 4/14/2025 to 6/27/2025  Date of Evaluation: 5/6/2025  Plan of Care Certification: 5/6/2025 to 7/21/25     Time In:   8:55 AM  Time Out:   9:55 AM   Total Time:   55 minutes   Total Billable Time:  55    Intake Outcome Measure for FOTO Survey    Therapist reviewed FOTO scores for Zeeshan Patel on 5/6/2025.   FOTO report - see Media section or FOTO account episode details.     Intake Score:  %         Subjective  Patient reports chronic lower back pain/stiffness along with intermittent (R) hip/thigh discomfort. Pain level is minimal/moderate today.       Pain level = 4/10  Pain     Patient reports a current pain level of 4/10.               Patient reports pain is sharp and radiates from RLB around to anterior hip  Pt reports his BP is now under control and he is able to participate.  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    Zeeshan Patel  has a past surgical history that includes Appendectomy; left shoulder; Wrist Arthroplasty; Epidural steroid injection (N/A, 11/19/2024);  injection, sacroiliac joint (Right, 12/31/2024); and Injection of anesthetic agent around nerve (Right, 2/19/2025).    Zeeshan has a current medication list which includes the following prescription(s): gabapentin, hydrochlorothiazide, losartan, metformin, methocarbamol, and pravastatin.    Review of patient's allergies indicates:  No Known Allergies     Objective          Isometric Testing on Med X equipment: Testing administered by PT    Test Initial Baseline Midpoint Final   Date 4/21/25     ROM 9-30 deg     Max Peak Torque 74      Min Peak Torque 47      Flex/Ext Ratio 1.5     % variance  normative data 72     % change from initial test N/A visit 1         REPEATED TEST MOVEMENTS:    Baseline symptoms:  Repeated Flexion in Standing worse   Repeated Extension in Standing worse   Repeated Flexion in lying worse   Repeated Extension in lying  worse     Outcomes:  Intake Score: 38%  Visit 6 Score: 40%  Visit 10 Score:   Discharge Score:  Goal Score: 55%     Treatment:     Zeeshan received neuromuscular education  to isolate and engage spinal stabilization musculature correctly for motor control and coordination to aid in function and posture for 10 minutes on the Medical [employer] Machine.  Patient performed MedX dynamic exercise with emphasis on spinal muscular control using pacer throughout  active range of motion. Therapist assisted patient in achieving optimal exertion for neural reeducation and endurance training by using the  Negrito Exertion Rating scale, by instructing the patient to aim for mid range of exertion, performing 15-20 repetitions, slowly, correctly,and safely.           5/6/2025     9:37 AM   [facility] - Short   Visit Number 6   VAS Pain Rating 4   Lumbar Stretches - Slouch 10   Flexion in Lying 10   Flexion in Sitting 15   Lumbar Flexion 36   Lumbar Extension 6   Lumbar Weight 44 lbs   Repetitions 18   Rating of Perceived Exertion 5        Patient participated in therapeutic exercises to  develop strength, endurance, ROM, flexibility, posture, and core stabilization for 45 minutes including:    LTR x 10  DKTC x 10 c/ swissball  Piriformis stretch 2 x 20 sec  ER stretch for hip 3 x 10 sec  HSS c/ strap 2x 20 sec sec  Supine sciatic nerve glide R LE x 10  R)LE long axis distraction  Passive R QL stretch with therapist assist  PPT x 10  +TrA + SLR x 10  Bridge + BTB15x  BKFO BTB x 10  Swissball flexion 5x fwd/side to side  Paloff press 15# x 15      Peripheral muscle strengthening which included one  set of 15-20 repetitions at a slow and controlled 10-13 second per rep pace focused on strengthening supporting musculature in order to improve body mechanics and functional mobility. Patient and therapist focused on proper form during treatment to ensure optimal strengthening of each targeted muscle group.  Machines utilized included:  torso rotation/hip abd/add/knee extension. Leg ext, leg curl bicep curl tricep ext, rows and chest press      Time Entry(in minutes):       Assessment & Plan  Zeeshan returns returns reporting chronic lower back and intermittent R hip/thigh discomfort/stiffness which is rated as moderate = 4/10 today. He reports some increased soreness after moving furniture over the weekend.Treatment continued with flexibility, strengthening and neuromuscular reeducation exs.  Progressions included BTB resistance for bridging with band and BKFO, Addition of TrA activation + SLR. He was able to perform ex's with min cues. Continued with long axis distraction which provides some relief of symptoms Lumbar MedX r6-36 degrees,  resistance was maintained at 44ft/lbs and he completed 18 reps with a RPE = 5/10 with some discomfort reported (Relieved with additional R LE long axis distraction and passive R QL stretch.   He completed peripheral strengthening ex's without c/o. Will continue per HB protocol and patient tolerance.  Updated FOTO scoring is slightly improved.         Plan: Continue PT  towards established goals and plan of care    Patient's spiritual, cultural, and educational needs considered and patient agreeable to plan of care and goals.              GOALS: Pt is in agreement with the following goals.    Short term goals:  6 weeks or 10 visits   - Pt will demonstrate increased lumbar MedX ROM by at least 3 degrees from the initial ROM value with improvements noted in functional ROM and ability to perform ADLs. Appropriate and Ongoing  - Pt will demonstrate increased MedX average isometric strength value by 20% from initial test resulting in improved ability to perform bending, lifting, and carrying activities safely, confidently. Appropriate and Ongoing  - Pt will report a reduction in worst pain score by 1-2 points for improved tolerance for walking. Appropriate and Ongoing  - Pt able to perform HEP correctly with minimal cueing or supervision from therapist to encourage independent management of symptoms. Appropriate and Ongoing    Long term goals: 10 weeks or 20 visits   - Pt will demonstrate increased lumbar MedX ROM by at least 9 degrees from initial ROM value, resulting in improved ability to perform functional forward bending while standing and sitting. Appropriate and Ongoing  - Pt will demonstrate increased MedX average isometric strength value by 40% from initial test resulting in improved ability to perform bending, lifting, and carrying activities safely and confidently. Appropriate and Ongoing  - Pt to demonstrate ability to independently control and reduce their pain through posture positioning and mechanical movements throughout a typical day. Appropriate and Ongoing  - Pt will demonstrate reduced pain and improved functional outcomes as reported on the FOTO by reaching an intake score of >/= 55% functional ability in order to demonstrate subjective improvement in patient's condition. . Appropriate and Ongoing  - Pt will demonstrate independence with the HEP at discharge.  Appropriate and Ongoing      Zaheer Bob, LINDA    5/6/2025

## 2025-05-08 ENCOUNTER — CLINICAL SUPPORT (OUTPATIENT)
Dept: REHABILITATION | Facility: HOSPITAL | Age: 55
End: 2025-05-08
Payer: COMMERCIAL

## 2025-05-08 DIAGNOSIS — M53.86 DECREASED RANGE OF MOTION OF LUMBAR SPINE: Primary | ICD-10-CM

## 2025-05-08 PROCEDURE — 97112 NEUROMUSCULAR REEDUCATION: CPT | Mod: CQ

## 2025-05-08 PROCEDURE — 97110 THERAPEUTIC EXERCISES: CPT | Mod: CQ

## 2025-05-08 NOTE — PROGRESS NOTES
Outpatient Rehab    Physical Therapy Evaluation    Patient Name: Zeeshan Patel  MRN: [id number]  YOB: 1970  Encounter Date: 5/8/2025    Therapy Diagnosis:   Encounter Diagnosis   Name Primary?    Decreased range of motion of lumbar spine Yes     Physician: Tsering Franks NP    Physician Orders: Eval and Treat  Medical Diagnosis: Lumbosacral spondylosis without myelopathy  Primary osteoarthritis of right hip  Sacroiliac joint pain    Visit # / Visits Authorized:  7/20  Insurance Authorization Period: 4/14/2025 to 6/27/2025  Date of Evaluation: 5/8/2025  Plan of Care Certification: 5/8/2025 to 7/21/25     Time In:   1:25 PM    Time Out:   2:25 PM   Total Time:    55 minutes   Total Billable Time:  55    Intake Outcome Measure for FOTO Survey    Therapist reviewed FOTO scores for Zeeshan Patel on 5/8/2025.   FOTO report - see Media section or FOTO account episode details.     Intake Score:  %         Subjective  Patient reports chronic lower back pain/stiffness along with intermittent (R) hip/thigh discomfort. States that symptoms are slightly improved since the start of care.      Pain level = 4/10       Patient reports pain is sharp and radiates from RLB around to anterior hip  Pt reports his BP is now under control and he is able to participate.  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    Zeeshan Patel  has a past surgical history that includes Appendectomy; left shoulder; Wrist Arthroplasty; Epidural steroid injection (N/A, 11/19/2024); injection, sacroiliac joint  (Right, 12/31/2024); and Injection of anesthetic agent around nerve (Right, 2/19/2025).    Zeeshan has a current medication list which includes the following prescription(s): gabapentin, hydrochlorothiazide, losartan, metformin, methocarbamol, and pravastatin.    Review of patient's allergies indicates:  No Known Allergies     Objective          Isometric Testing on Med X equipment: Testing administered by PT    Test Initial Baseline Midpoint Final   Date 4/21/25     ROM 9-30 deg     Max Peak Torque 74      Min Peak Torque 47      Flex/Ext Ratio 1.5     % variance  normative data 72     % change from initial test N/A visit 1         REPEATED TEST MOVEMENTS:    Baseline symptoms:  Repeated Flexion in Standing worse   Repeated Extension in Standing worse   Repeated Flexion in lying worse   Repeated Extension in lying  worse     Outcomes:  Intake Score: 38%  Visit 6 Score: 40%  Visit 10 Score:   Discharge Score:  Goal Score: 55%     Treatment:     Zeeshan received neuromuscular education  to isolate and engage spinal stabilization musculature correctly for motor control and coordination to aid in function and posture for 10 minutes on the Medical Medx Machine.  Patient performed MedX dynamic exercise with emphasis on spinal muscular control using pacer throughout  active range of motion. Therapist assisted patient in achieving optimal exertion for neural reeducation and endurance training by using the  Negrito Exertion Rating scale, by instructing the patient to aim for mid range of exertion, performing 15-20 repetitions, slowly, correctly,and safely.          5/8/2025     1:36 PM   [facility] - Short   Visit Number 7   VAS Pain Rating 3   Time 5   Lumbar Stretches - Slouch 10   Flexion in Lying 10   Flexion in Sitting 15   Lumbar Weight 44 lbs   Repetitions 20   Rating of Perceived Exertion 4      Patient participated in therapeutic exercises to develop strength, endurance, ROM, flexibility, posture, and core  stabilization for 45 minutes including:    LTR x 10  DKTC x 10 c/ swissball  Piriformis stretch 2 x 20 sec  ER stretch for hip 3 x 10 sec  HSS c/ strap 2x 20 sec sec  Supine sciatic nerve glide R LE x 10  R)LE long axis distraction  +Open book stretch x 10  Passive R QL stretch with therapist assist--NP  PPT x 10  TrA + SLR x 10 + 1#  Bridge + BTBx20  BKFO BTB x 15  SOC x 10  Swissball flexion 5x fwd/side to side  Paloff press 15# x 15      Peripheral muscle strengthening which included one  set of 15-20 repetitions at a slow and controlled 10-13 second per rep pace focused on strengthening supporting musculature in order to improve body mechanics and functional mobility. Patient and therapist focused on proper form during treatment to ensure optimal strengthening of each targeted muscle group.  Machines utilized included:  torso rotation/hip abd/add/knee extension. Leg ext, leg curl bicep curl tricep ext, rows and chest press      Time Entry(in minutes):       Assessment & Plan  Zeeshan returns returns reporting chronic lower back and intermittent R hip/thigh discomfort/stiffness which is rated as moderate = 3/10 today and slightly improved since the start of care..Treatment continued with flexibility, strengthening and neuromuscular reeducation exs.  Progressions included light resistance with TrA activation + SLR and added open book stretch. Min back discomfort initially with bridging ex that resolved with reps otherwise, no c/o.   Continued with long axis distraction which provides some relief of symptoms Lumbar MedX resistance was maintained at 44 ft/lbs and he progressed to complete 20 reps with a RPE = 4/10 (Lumbar ROll used on MedX today which was more comfortable per subjective report) He completed peripheral strengthening ex's without c/o. Will continue per HB protocol and patient tolerance.        Plan: Continue PT towards established goals and plan of care    Patient's spiritual, cultural, and educational  needs considered and patient agreeable to plan of care and goals.          GOALS: Pt is in agreement with the following goals.    Short term goals:  6 weeks or 10 visits   - Pt will demonstrate increased lumbar MedX ROM by at least 3 degrees from the initial ROM value with improvements noted in functional ROM and ability to perform ADLs. Appropriate and Ongoing  - Pt will demonstrate increased MedX average isometric strength value by 20% from initial test resulting in improved ability to perform bending, lifting, and carrying activities safely, confidently. Appropriate and Ongoing  - Pt will report a reduction in worst pain score by 1-2 points for improved tolerance for walking. Appropriate and Ongoing  - Pt able to perform HEP correctly with minimal cueing or supervision from therapist to encourage independent management of symptoms. Appropriate and Ongoing    Long term goals: 10 weeks or 20 visits   - Pt will demonstrate increased lumbar MedX ROM by at least 9 degrees from initial ROM value, resulting in improved ability to perform functional forward bending while standing and sitting. Appropriate and Ongoing  - Pt will demonstrate increased MedX average isometric strength value by 40% from initial test resulting in improved ability to perform bending, lifting, and carrying activities safely and confidently. Appropriate and Ongoing  - Pt to demonstrate ability to independently control and reduce their pain through posture positioning and mechanical movements throughout a typical day. Appropriate and Ongoing  - Pt will demonstrate reduced pain and improved functional outcomes as reported on the FOTO by reaching an intake score of >/= 55% functional ability in order to demonstrate subjective improvement in patient's condition. . Appropriate and Ongoing  - Pt will demonstrate independence with the HEP at discharge. Appropriate and Ongoing      Zaheer Bob, PTA    5/8/2025

## 2025-05-13 ENCOUNTER — CLINICAL SUPPORT (OUTPATIENT)
Dept: REHABILITATION | Facility: HOSPITAL | Age: 55
End: 2025-05-13
Payer: COMMERCIAL

## 2025-05-13 DIAGNOSIS — M53.86 DECREASED RANGE OF MOTION OF LUMBAR SPINE: Primary | ICD-10-CM

## 2025-05-13 PROCEDURE — 97112 NEUROMUSCULAR REEDUCATION: CPT | Mod: CQ

## 2025-05-13 PROCEDURE — 97110 THERAPEUTIC EXERCISES: CPT | Mod: CQ

## 2025-05-13 NOTE — PROGRESS NOTES
Outpatient Rehab    Physical Therapy Evaluation    Patient Name: Zeeshan Patel  MRN: [id number]  YOB: 1970  Encounter Date: 5/13/2025    Therapy Diagnosis:   Encounter Diagnosis   Name Primary?    Decreased range of motion of lumbar spine Yes     Physician: Tsering Franks NP    Physician Orders: Eval and Treat  Medical Diagnosis: Lumbosacral spondylosis without myelopathy  Primary osteoarthritis of right hip  Sacroiliac joint pain    Visit # / Visits Authorized:  9/20    Insurance Authorization Period: 4/14/2025 to 6/27/2025  Date of Evaluation: 5/13/2025  Plan of Care Certification: 5/13/2025 to 7/21/25     Time In: 1000   Time Out: 1055   Total Time: 55     Total Billable Time: 55     Intake Outcome Measure for FOTO Survey    Therapist reviewed FOTO scores for Zeeshan Patel on 5/13/2025.   FOTO report - see Media section or FOTO account episode details.     Intake Score:  %         Subjective  Patient reports chronic lower back pain/stiffness along with intermittent (R) hip/thigh discomfort. States that symptoms are slightly improved since the start of care.      Pain level = 4/10       Patient reports pain is sharp and radiates from RLB around to anterior hip  Pt reports his BP is now under control and he is able to participate.  Prior Therapy: yes  Prior Treatment: injection, physical therapy   Social History:  lives with their spouse  Occupation: [employer] for [employer]  Leisure: [employer]      Prior Level of Function: independent  Current Level of Function: independent  DME owned/used: none  Gym Membership: no  Past Medical History/Physical Systems Review:   Zeeshan Patel  has a past medical history of GERD (gastroesophageal reflux disease) and Ulcer.    Zeeshan Patel  has a past surgical history that includes Appendectomy; left shoulder; Wrist Arthroplasty; Epidural steroid injection (N/A, 11/19/2024); injection, sacroiliac joint (Right,  12/31/2024); and Injection of anesthetic agent around nerve (Right, 2/19/2025).    Zeeshan has a current medication list which includes the following prescription(s): gabapentin, hydrochlorothiazide, losartan, metformin, methocarbamol, and pravastatin.    Review of patient's allergies indicates:  No Known Allergies     Objective          Isometric Testing on Med X equipment: Testing administered by PT    Test Initial Baseline Midpoint Final   Date 4/21/25     ROM 9-30 deg     Max Peak Torque 74      Min Peak Torque 47      Flex/Ext Ratio 1.5     % variance  normative data 72     % change from initial test N/A visit 1         REPEATED TEST MOVEMENTS:    Baseline symptoms:  Repeated Flexion in Standing worse   Repeated Extension in Standing worse   Repeated Flexion in lying worse   Repeated Extension in lying  worse     Outcomes:  Intake Score: 38%  Visit 6 Score: 40%  Visit 10 Score:   Discharge Score:  Goal Score: 55%     Treatment:     Zeeshan received neuromuscular education  to isolate and engage spinal stabilization musculature correctly for motor control and coordination to aid in function and posture for 10 minutes on the Medical Medx Machine.  Patient performed MedX dynamic exercise with emphasis on spinal muscular control using pacer throughout  active range of motion. Therapist assisted patient in achieving optimal exertion for neural reeducation and endurance training by using the  Negrito Exertion Rating scale, by instructing the patient to aim for mid range of exertion, performing 15-20 repetitions, slowly, correctly,and safely.          5/13/2025    10:33 AM   [facility] - Short   Visit Number 8   VAS Pain Rating 3   Time 5   Lumbar Stretches - Slouch 10   Extension in Standing 10   Flexion in Lying 10   Flexion in Sitting 15   Lumbar Weight 48 lbs   Repetitions 15   Rating of Perceived Exertion 4       Patient participated in therapeutic exercises to develop strength, endurance, ROM, flexibility,  posture, and core stabilization for 45 minutes including:    LTR x 10  DKTC x 10 c/ swissball  Piriformis stretch 2 x 20 sec  ER stretch for hip 3 x 10 sec  HSS c/ strap 2x 20 sec sec  Supine sciatic nerve glide R LE x 10  R)LE long axis distraction  Open book stretch x 10  Passive R QL stretch with therapist assist--NP  PPT x 10  TrA + SLR x 10 + 1.5#  Bridge + black tband x20  BKFO black Tband x 15  SOC x 10  Swissball flexion 5x fwd/side to side  Paloff press 15# x 15  + EIS gentle x 10      Peripheral muscle strengthening which included one  set of 15-20 repetitions at a slow and controlled 10-13 second per rep pace focused on strengthening supporting musculature in order to improve body mechanics and functional mobility. Patient and therapist focused on proper form during treatment to ensure optimal strengthening of each targeted muscle group.  Machines utilized included:  torso rotation/hip abd/add/knee extension. Leg ext, leg curl bicep curl tricep ext, rows and chest press      Time Entry(in minutes):  Neuromuscular Re-Education Time Entry: 10  Therapeutic Exercise Time Entry: 45    Assessment & Plan  Zeeshan returns returns reporting chronic lower back and intermittent R hip/thigh discomfort/stiffness which is rated as moderate = 3/10 today and slightly improved since the start of care..Treatment continued with flexibility, strengthening and neuromuscular reeducation exs.  Progressions included black band with bridging, bKFO, increased resistance for TrA + SLR and also added gentle EIS.  He was able to perform ex's with min cues and without c/o. Continued with long axis distraction which provides some relief of symptoms Lumbar MedX resistance was increased 48 ft/lbs and he completed 15 reps with a RPE = 4/10 He completed peripheral strengthening ex's without c/o. Will continue per HB protocol and patient tolerance.        Plan: Continue PT towards established goals and plan of care    Patient's spiritual,  cultural, and educational needs considered and patient agreeable to plan of care and goals.          GOALS: Pt is in agreement with the following goals.    Short term goals:  6 weeks or 10 visits   - Pt will demonstrate increased lumbar MedX ROM by at least 3 degrees from the initial ROM value with improvements noted in functional ROM and ability to perform ADLs. Appropriate and Ongoing  - Pt will demonstrate increased MedX average isometric strength value by 20% from initial test resulting in improved ability to perform bending, lifting, and carrying activities safely, confidently. Appropriate and Ongoing  - Pt will report a reduction in worst pain score by 1-2 points for improved tolerance for walking. Appropriate and Ongoing  - Pt able to perform HEP correctly with minimal cueing or supervision from therapist to encourage independent management of symptoms. Appropriate and Ongoing    Long term goals: 10 weeks or 20 visits   - Pt will demonstrate increased lumbar MedX ROM by at least 9 degrees from initial ROM value, resulting in improved ability to perform functional forward bending while standing and sitting. Appropriate and Ongoing  - Pt will demonstrate increased MedX average isometric strength value by 40% from initial test resulting in improved ability to perform bending, lifting, and carrying activities safely and confidently. Appropriate and Ongoing  - Pt to demonstrate ability to independently control and reduce their pain through posture positioning and mechanical movements throughout a typical day. Appropriate and Ongoing  - Pt will demonstrate reduced pain and improved functional outcomes as reported on the FOTO by reaching an intake score of >/= 55% functional ability in order to demonstrate subjective improvement in patient's condition. . Appropriate and Ongoing  - Pt will demonstrate independence with the HEP at discharge. Appropriate and Ongoing      Zaheer Bob, PTA    5/13/2025

## 2025-05-15 ENCOUNTER — CLINICAL SUPPORT (OUTPATIENT)
Dept: REHABILITATION | Facility: HOSPITAL | Age: 55
End: 2025-05-15
Payer: COMMERCIAL

## 2025-05-15 DIAGNOSIS — M53.86 DECREASED RANGE OF MOTION OF LUMBAR SPINE: Primary | ICD-10-CM

## 2025-05-15 PROCEDURE — 97110 THERAPEUTIC EXERCISES: CPT

## 2025-05-15 PROCEDURE — 97112 NEUROMUSCULAR REEDUCATION: CPT

## 2025-05-15 NOTE — PROGRESS NOTES
Outpatient Rehab    Physical Therapy Evaluation    Patient Name: Zeeshan Patel  MRN: [id number]  YOB: 1970  Encounter Date: 5/15/2025    Therapy Diagnosis:   Encounter Diagnosis   Name Primary?    Decreased range of motion of lumbar spine Yes     Physician: Tsering Franks NP    Physician Orders: Eval and Treat  Medical Diagnosis: Lumbosacral spondylosis without myelopathy  Primary osteoarthritis of right hip  Sacroiliac joint pain    Visit # / Visits Authorized:  9/20    Insurance Authorization Period: 4/14/2025 to 6/27/2025  Date of Evaluation: 5/15/2025  Plan of Care Certification: 5/15/2025 to 7/21/25     Time In:   840  Time Out:   940  Total Time:     60  Total Billable Time:   55    Intake Outcome Measure for FOTO Survey    Therapist reviewed FOTO scores for Zeeshan Patel on 5/15/2025.   FOTO report - see Media section or FOTO account episode details.     Intake Score: 38%         Subjective  Pt states his LBP is decreasing overall.  States he has not been sleeping well as his son has been sick     Pain level = 4/10  Pain     Patient reports a current pain level of 3/10.                 Patient reports pain is sharp and radiates from RLB around to anterior hip  Pt reports his BP is now under control and he is able to participate.  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    Zeeshan Patel  has a past surgical history that includes Appendectomy; left shoulder; Wrist Arthroplasty; Epidural steroid injection (N/A, 11/19/2024); injection, sacroiliac joint (Right,  12/31/2024); and Injection of anesthetic agent around nerve (Right, 2/19/2025).    Zeeshan has a current medication list which includes the following prescription(s): gabapentin, hydrochlorothiazide, losartan, metformin, methocarbamol, and pravastatin.    Review of patient's allergies indicates:  No Known Allergies     Objective          Isometric Testing on Med X equipment: Testing administered by PT    Test Initial Baseline Midpoint Final   Date 4/21/25     ROM 9-30 deg     Max Peak Torque 74      Min Peak Torque 47      Flex/Ext Ratio 1.5     % variance  normative data 72     % change from initial test N/A visit 1         REPEATED TEST MOVEMENTS:    Baseline symptoms:  Repeated Flexion in Standing worse   Repeated Extension in Standing worse   Repeated Flexion in lying worse   Repeated Extension in lying  worse     Outcomes:  Intake Score: 38%  Visit 6 Score: 40%  Visit 10 Score:   Discharge Score:  Goal Score: 55%     Treatment:     Zeeshan received neuromuscular education  to isolate and engage spinal stabilization musculature correctly for motor control and coordination to aid in function and posture for 10 minutes on the Medical Medx Machine.  Patient performed MedX dynamic exercise with emphasis on spinal muscular control using pacer throughout  active range of motion. Therapist assisted patient in achieving optimal exertion for neural reeducation and endurance training by using the  Negrito Exertion Rating scale, by instructing the patient to aim for mid range of exertion, performing 15-20 repetitions, slowly, correctly,and safely.          5/15/2025     8:43 AM   [facility]   Visit Number 9   VAS Pain Rating 3   Time 5   Lumbar Stretches - Slouch Overcorrection 10   Extension in Standing 10   Flexion in Lying 10   Flexion in Sitting 15   Lumbar Weight 48 lbs   Repetitions 20   Rating of Perceived Exertion 4   Ice - Z Lie (in min.) 5       Patient participated in therapeutic exercises to develop  strength, endurance, ROM, flexibility, posture, and core stabilization for 50minutes including:    LTR x 10  DKTC x 10 c/ swissball  Piriformis stretch 2 x 20 sec  ER stretch for hip 3 x 10 sec  HSS c/ strap 2x 20 sec sec  Supine sciatic nerve glide R LE x 10  R)LE long axis distraction  Open book stretch x 10  TrA + SLR x 10 + 1.5#  Bridge + black tband x20  BKFO black Tband x 15  SOC x 10  Swissball flexion 5x fwd/side to side  Paloff press 15# x 15  + EIS gentle x 10      Peripheral muscle strengthening which included one  set of 15-20 repetitions at a slow and controlled 10-13 second per rep pace focused on strengthening supporting musculature in order to improve body mechanics and functional mobility. Patient and therapist focused on proper form during treatment to ensure optimal strengthening of each targeted muscle group.  Machines utilized included:  torso rotation/hip abd/add/knee extension. Leg ext, leg curl bicep curl tricep ext, rows and chest press      Time Entry(in minutes):  Neuromuscular Re-Education Time Entry: 10  Therapeutic Exercise Time Entry: 50    Assessment & Plan  Zeeshan returns returns reporting chronic lower back and intermittent R hip/thigh discomfort/stiffness which is rated as moderate = 3/10 today .Treatment continued with flexibility, strengthening and neuromuscular reeducation exs.  .  He was able to perform ex's with min cues and without c/o. Continued with long axis distraction which provides some relief of symptoms Lumbar MedX resistance was maintained at 48 ft/lbs and he completed 20reps with a RPE = 4/10.  Increase 5% next visit.  [facility] completed peripheral strengthening ex's without c/o. Will continue per HB protocol and patient tolerance.        Plan: Continue PT towards established goals and plan of care    Patient's spiritual, cultural, and educational needs considered and patient agreeable to plan of care and goals.           GOALS: Pt is in agreement with the following goals.    Short term goals:  6 weeks or 10 visits   - Pt will demonstrate increased lumbar MedX ROM by at least 3 degrees from the initial ROM value with improvements noted in functional ROM and ability to perform ADLs. Appropriate and Ongoing  - Pt will demonstrate increased MedX average isometric strength value by 20% from initial test resulting in improved ability to perform bending, lifting, and carrying activities safely, confidently. Appropriate and Ongoing  - Pt will report a reduction in worst pain score by 1-2 points for improved tolerance for walking. Appropriate and Ongoing  - Pt able to perform HEP correctly with minimal cueing or supervision from therapist to encourage independent management of symptoms. Appropriate and Ongoing    Long term goals: 10 weeks or 20 visits   - Pt will demonstrate increased lumbar MedX ROM by at least 9 degrees from initial ROM value, resulting in improved ability to perform functional forward bending while standing and sitting. Appropriate and Ongoing  - Pt will demonstrate increased MedX average isometric strength value by 40% from initial test resulting in improved ability to perform bending, lifting, and carrying activities safely and confidently. Appropriate and Ongoing  - Pt to demonstrate ability to independently control and reduce their pain through posture positioning and mechanical movements throughout a typical day. Appropriate and Ongoing  - Pt will demonstrate reduced pain and improved functional outcomes as reported on the FOTO by reaching an intake score of >/= 55% functional ability in order to demonstrate subjective improvement in patient's condition. . Appropriate and Ongoing  - Pt will demonstrate independence with the HEP at discharge. Appropriate and Ongoing      Grisel Nieto, PT    5/15/2025

## 2025-05-22 ENCOUNTER — TELEPHONE (OUTPATIENT)
Dept: PAIN MEDICINE | Facility: CLINIC | Age: 55
End: 2025-05-22
Payer: MEDICAID

## 2025-05-22 ENCOUNTER — CLINICAL SUPPORT (OUTPATIENT)
Dept: REHABILITATION | Facility: HOSPITAL | Age: 55
End: 2025-05-22
Payer: COMMERCIAL

## 2025-05-22 DIAGNOSIS — M53.86 DECREASED RANGE OF MOTION OF LUMBAR SPINE: Primary | ICD-10-CM

## 2025-05-22 PROCEDURE — 97110 THERAPEUTIC EXERCISES: CPT

## 2025-05-22 PROCEDURE — 97750 PHYSICAL PERFORMANCE TEST: CPT

## 2025-05-22 NOTE — PROGRESS NOTES
Outpatient Rehab    Physical Therapy Evaluation    Patient Name: Zeeshan Patel  MRN: [id number]  YOB: 1970  Encounter Date: 5/22/2025    Therapy Diagnosis:   Encounter Diagnosis   Name Primary?    Decreased range of motion of lumbar spine Yes     Physician: Tsering Franks NP    Physician Orders: Eval and Treat  Medical Diagnosis: Lumbosacral spondylosis without myelopathy  Primary osteoarthritis of right hip  Sacroiliac joint pain    Visit # / Visits Authorized:  10/20    Insurance Authorization Period: 4/14/2025 to 6/27/2025  Date of Evaluation: 5/22/2025  Plan of Care Certification: 5/22/2025 to 7/21/25     Time In:   9  Time Out:  10  Total Time:     60  Total Billable Time:   55    Intake Outcome Measure for FOTO Survey    Therapist reviewed FOTO scores for Zeeshan Patel on 5/22/2025.   FOTO report - see Media section or FOTO account episode details.     Intake Score: 38%         Subjective  Pt states he is going to a MD appt today with a neurosurgeon     Pain level = 4/10  Pain     Patient reports a current pain level of 4/10.                Patient reports pain is sharp and radiates from RLB around to anterior hip  Pt reports his BP is now under control and he is able to participate.  Prior Therapy: yes  Prior Treatment: injection, physical therapy   Social History:  lives with their spouse  Occupation:  for [employer] properties  Leisure: [employer]      Prior Level of Function: independent  Current Level of Function: independent  DME owned/used: none  Gym Membership: no  Past Medical History/Physical Systems Review:   Zeeshan Patel  has a past medical history of GERD (gastroesophageal reflux disease) and Ulcer.    Zeeshan Patel  has a past surgical history that includes Appendectomy; left shoulder; Wrist Arthroplasty; Epidural steroid injection (N/A, 11/19/2024); injection, sacroiliac joint (Right, 12/31/2024); and Injection of anesthetic agent  around nerve (Right, 2/19/2025).    Zeeshan has a current medication list which includes the following prescription(s): gabapentin, hydrochlorothiazide, losartan, metformin, methocarbamol, and pravastatin.    Review of patient's allergies indicates:  No Known Allergies     Objective          Isometric Testing on Med X equipment: Testing administered by PT    Test Initial Baseline Midpoint Final   Date 4/21/25 5/22/25    ROM 9-30 deg 6-36    Max Peak Torque 74  116    Min Peak Torque 47  81    Flex/Ext Ratio 1.5 1.4:1    % variance  normative data 72 56%    % change from initial test N/A visit 1 52%        REPEATED TEST MOVEMENTS:    Baseline symptoms:  Repeated Flexion in Standing worse   Repeated Extension in Standing worse   Repeated Flexion in lying worse   Repeated Extension in lying  worse     Outcomes:  Intake Score: 38%  Visit 6 Score: 40%  Visit 10 Score: 48%  Discharge Score:  Goal Score: 55%     Treatment:     Zeeshan received neuromuscular education  to isolate and engage spinal stabilization musculature correctly for motor control and coordination to aid in function and posture for 10 minutes on the Medical Medx Machine.  Patient performed MedX dynamic exercise with emphasis on spinal muscular control using pacer throughout  active range of motion. Therapist assisted patient in achieving optimal exertion for neural reeducation and endurance training by using the  Negrito Exertion Rating scale, by instructing the patient to aim for mid range of exertion, performing 15-20 repetitions, slowly, correctly,and safely.        5/22/2025     9:23 AM   [facility]   Visit Number 10   VAS Pain Rating 4   Time 5   Lumbar Stretches - Slouch Overcorrection 10   Extension in Standing 10   Flexion in Lying 10   Flexion in Sitting 15   Lumbar Flexion 36   Lumbar Extension 3   Lumbar Peak Torque 116 ft. lbs.   Min Torque 81   Test Percent Below Normative Data 35 %   Test Percent Gain in Strength from Initial  56 %    Ice - Z Lie (in min.) 5         Patient participated in therapeutic exercises to develop strength, endurance, ROM, flexibility, posture, and core stabilization for 50minutes including:    LTR x 10  DKTC x 10 c/ swissball  Piriformis stretch 2 x 20 sec  ER stretch for hip 3 x 10 sec  HSS c/ strap 2x 20 sec sec  Supine sciatic nerve glide R LE x 10  R)LE long axis distraction  Open book stretch x 10  TrA + SLR x 10 + 1.5#  Bridge + black tband x20  BKFO black Tband x 15  SOC x 10  Swissball flexion 5x fwd/side to side  Paloff press 15# x 15  + EIS gentle x 10      Peripheral muscle strengthening which included one  set of 15-20 repetitions at a slow and controlled 10-13 second per rep pace focused on strengthening supporting musculature in order to improve body mechanics and functional mobility. Patient and therapist focused on proper form during treatment to ensure optimal strengthening of each targeted muscle group.  Machines utilized included:  torso rotation/hip abd/add/knee extension. Leg ext, leg curl bicep curl tricep ext, rows and chest press      Time Entry(in minutes):  Physical Performance Test (with Report) Time Entry: 10  Therapeutic Exercise Time Entry: 50    Assessment & Plan    Patient has attended 10 visits at [facility] which included MD evaluation, PT evaluation with isometric testing, and physical therapy treatment including HEP instruction, education, aerobic activity, dynamic strengthening on MedX equipment for the spine, and whole body strengthening on MedX equipment with increasing resistance. Patient demonstrates increased ability to reduce symptoms, improve posture, improve ROM, and improve strength, as stated below:      -Improved 12 ROM, initially on MedX test 9-30 and currently 3-36.  -Improved strength at each test point on lumbar MedX isometric test with 52% average improvement noted with reduced pain noted by patient.  -Initial outcome tool score 38 and current outcome tool  score 48 indicating reduced pain and improved function.       Plan: Continue PT towards established goals and plan of care    Patient's spiritual, cultural, and educational needs considered and patient agreeable to plan of care and goals.          GOALS: Pt is in agreement with the following goals.    Short term goals:  6 weeks or 10 visits   - Pt will demonstrate increased lumbar MedX ROM by at least 3 degrees from the initial ROM value with improvements noted in functional ROM and ability to perform ADLs. MET  - Pt will demonstrate increased MedX average isometric strength value by 20% from initial test resulting in improved ability to perform bending, lifting, and carrying activities safely, confidently.MET  - Pt will report a reduction in worst pain score by 1-2 points for improved tolerance for walking. Appropriate and Ongoing  - Pt able to perform HEP correctly with minimal cueing or supervision from therapist to encourage independent management of symptoms. Appropriate and Ongoing    Long term goals: 10 weeks or 20 visits   - Pt will demonstrate increased lumbar MedX ROM by at least 9 degrees from initial ROM value, resulting in improved ability to perform functional forward bending while standing and sitting. MET  - Pt will demonstrate increased MedX average isometric strength value by 40% from initial test resulting in improved ability to perform bending, lifting, and carrying activities safely and confidently. MET  - Pt to demonstrate ability to independently control and reduce their pain through posture positioning and mechanical movements throughout a typical day. Appropriate and Ongoing  - Pt will demonstrate reduced pain and improved functional outcomes as reported on the FOTO by reaching an intake score of >/= 55% functional ability in order to demonstrate subjective improvement in patient's condition. . Appropriate and Ongoing  - Pt will demonstrate independence with the HEP at discharge. Appropriate  and Ongoing      Grisel Nieto, PT    5/22/2025

## 2025-05-27 ENCOUNTER — CLINICAL SUPPORT (OUTPATIENT)
Dept: REHABILITATION | Facility: HOSPITAL | Age: 55
End: 2025-05-27
Payer: COMMERCIAL

## 2025-05-27 DIAGNOSIS — M53.86 DECREASED RANGE OF MOTION OF LUMBAR SPINE: Primary | ICD-10-CM

## 2025-05-27 PROCEDURE — 97110 THERAPEUTIC EXERCISES: CPT

## 2025-05-27 PROCEDURE — 97112 NEUROMUSCULAR REEDUCATION: CPT

## 2025-05-27 NOTE — PROGRESS NOTES
Outpatient Rehab    Physical Therapy Progress Note : Updated Plan of Care        Patient Name: Zeeshan Patel  MRN: [id number]  YOB: 1970  Encounter Date: 5/27/2025    Therapy Diagnosis:   Encounter Diagnosis   Name Primary?    Decreased range of motion of lumbar spine Yes     Physician: Tsering Franks NP    Physician Orders: Eval and Treat  Medical Diagnosis: Lumbosacral spondylosis without myelopathy  Primary osteoarthritis of right hip  Sacroiliac joint pain    Visit # / Visits Authorized:  11/20    Insurance Authorization Period: 4/14/2025 to 6/27/2025  Date of Evaluation: 2/27/2025  Plan of Care Certification: 5/27/2025 to 7/21/25     Time In: 1450   Time Out: 1550  Total Time: 60    Total Billable Time: 55     Intake Outcome Measure for FOTO Survey    Therapist reviewed FOTO scores for Zeeshan Patel on 5/27/2025.   FOTO report - see Media section or FOTO account episode details.     Intake Score: 38%         Subjective  Pt states he is going to a MD appt today with a neurosurgeon     Pain level = 4/10  Pain     Patient reports a current pain level of 5/10.                  Patient reports pain is sharp and radiates from RLB around to anterior hip  Pt reports his BP is now under control and he is able to participate.  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    Zeeshan Patel  has a past surgical history that includes Appendectomy; left shoulder; Wrist Arthroplasty; Epidural steroid injection (N/A, 11/19/2024); injection, sacroiliac joint (Right, 12/31/2024); and  Injection of anesthetic agent around nerve (Right, 2/19/2025).    Zeeshan has a current medication list which includes the following prescription(s): gabapentin, hydrochlorothiazide, losartan, metformin, methocarbamol, and pravastatin.    Review of patient's allergies indicates:  No Known Allergies     Objective          Isometric Testing on Med X equipment: Testing administered by PT    Test Initial Baseline Midpoint Final   Date 4/21/25 5/22/25    ROM 9-30 deg 6-36    Max Peak Torque 74  116    Min Peak Torque 47  81    Flex/Ext Ratio 1.5 1.4:1    % variance  normative data 72 56%    % change from initial test N/A visit 1 52%        REPEATED TEST MOVEMENTS:    Baseline symptoms:  Repeated Flexion in Standing worse   Repeated Extension in Standing worse   Repeated Flexion in lying worse   Repeated Extension in lying  worse     Outcomes:  Intake Score: 38%  Visit 6 Score: 40%  Visit 10 Score: 48%  Discharge Score:  Goal Score: 55%     Treatment:     Zeeshan received neuromuscular education  to isolate and engage spinal stabilization musculature correctly for motor control and coordination to aid in function and posture for 10 minutes on the Medical Medx Machine.  Patient performed MedX dynamic exercise with emphasis on spinal muscular control using pacer throughout  active range of motion. Therapist assisted patient in achieving optimal exertion for neural reeducation and endurance training by using the  Negrito Exertion Rating scale, by instructing the patient to aim for mid range of exertion, performing 15-20 repetitions, slowly, correctly,and safely.          5/27/2025     3:40 PM   [facility]   Visit Number 11   VAS Pain Rating 5   Treadmill Time (in min.) 5 min   Lumbar Stretches - Slouch Overcorrection 10   Extension in Standing 10   Flexion in Lying 10   Flexion in Sitting 15   Lumbar Weight 53 lbs   Repetitions 18   Rating of Perceived Exertion 4   Ice - Z Lie (in min.) 5         Patient participated in  therapeutic exercises to develop strength, endurance, ROM, flexibility, posture, and core stabilization for 50minutes including:    LTR x 10  DKTC x 10 c/ swissball  Piriformis stretch 2 x 20 sec  ER stretch for hip 3 x 10 sec  HSS c/ strap 2x 20 sec sec  Supine sciatic nerve glide R LE x 10  R)LE long axis distraction  Open book stretch x 10  TrA + SLR x 15 with 2#  Bridge + black tband x20  BKFO black Tband x 15  SOC x 10  Swissball flexion 5x fwd/side to side  Paloff press 15# x 15  + EIS gentle x 10      Peripheral muscle strengthening which included one  set of 15-20 repetitions at a slow and controlled 10-13 second per rep pace focused on strengthening supporting musculature in order to improve body mechanics and functional mobility. Patient and therapist focused on proper form during treatment to ensure optimal strengthening of each targeted muscle group.  Machines utilized included:  torso rotation/hip abd/add/knee extension. Leg ext, leg curl bicep curl tricep ext, rows and chest press      Time Entry(in minutes):  Hot/Cold Pack Time Entry: 5  Neuromuscular Re-Education Time Entry: 10  Therapeutic Exercise Time Entry: 45    Assessment & Plan    Zeeshan returns reporting some continued R lower back discomfort that is minimal currently and slightly improved since the start of care. Treatment continued with flexibility, strengthening and neuromuscular reeducation exs. Progressions included Black band resistance for BKFO and bridging with band, increased resistance for TrA activation + SLR and also added gentle EIS. He was able to perform ex's with min cues and wihtout increased pain. SOme relief reported with long axis distraction.. Lumbar MedX resistance was increased to 48 ft/lbs and he completed 15 reps with a RPE = 4/10. He completed peripheral strengthening ex's without c/o. Will continue per HB protocol and patient tolerance.     Plan: Continue PT towards established goals and plan of care    Patient's  spiritual, cultural, and educational needs considered and patient agreeable to plan of care and goals.          GOALS: Pt is in agreement with the following goals.    Short term goals:  6 weeks or 10 visits   - Pt will demonstrate increased lumbar MedX ROM by at least 3 degrees from the initial ROM value with improvements noted in functional ROM and ability to perform ADLs. MET  - Pt will demonstrate increased MedX average isometric strength value by 20% from initial test resulting in improved ability to perform bending, lifting, and carrying activities safely, confidently.MET  - Pt will report a reduction in worst pain score by 1-2 points for improved tolerance for walking. Appropriate and Ongoing  - Pt able to perform HEP correctly with minimal cueing or supervision from therapist to encourage independent management of symptoms. Appropriate and Ongoing    Long term goals: 10 weeks or 20 visits   - Pt will demonstrate increased lumbar MedX ROM by at least 9 degrees from initial ROM value, resulting in improved ability to perform functional forward bending while standing and sitting. MET  - Pt will demonstrate increased MedX average isometric strength value by 40% from initial test resulting in improved ability to perform bending, lifting, and carrying activities safely and confidently. MET  - Pt to demonstrate ability to independently control and reduce their pain through posture positioning and mechanical movements throughout a typical day. Appropriate and Ongoing  - Pt will demonstrate reduced pain and improved functional outcomes as reported on the FOTO by reaching an intake score of >/= 55% functional ability in order to demonstrate subjective improvement in patient's condition. . Appropriate and Ongoing  - Pt will demonstrate independence with the HEP at discharge. Appropriate and Ongoing      Paige Alatorre, PT    5/27/2025

## 2025-05-29 ENCOUNTER — CLINICAL SUPPORT (OUTPATIENT)
Dept: REHABILITATION | Facility: HOSPITAL | Age: 55
End: 2025-05-29
Payer: COMMERCIAL

## 2025-05-29 ENCOUNTER — DOCUMENTATION ONLY (OUTPATIENT)
Dept: REHABILITATION | Facility: HOSPITAL | Age: 55
End: 2025-05-29
Payer: MEDICAID

## 2025-05-29 DIAGNOSIS — M53.86 DECREASED RANGE OF MOTION OF LUMBAR SPINE: Primary | ICD-10-CM

## 2025-05-29 PROCEDURE — 97110 THERAPEUTIC EXERCISES: CPT | Mod: CQ

## 2025-05-29 PROCEDURE — 97112 NEUROMUSCULAR REEDUCATION: CPT | Mod: CQ

## 2025-05-29 NOTE — PROGRESS NOTES
PT/PTA met face to face to discuss pt's treatment plan and progress towards established goals. Pt will be seen by a physical therapist minimally every 6th visit or every 30 days.    Zaheer Bob PTA

## 2025-05-29 NOTE — PROGRESS NOTES
Outpatient Rehab    Physical Therapy Progress Note : Updated Plan of Care        Patient Name: Zeeshan Patel  MRN: [id number]  YOB: 1970  Encounter Date: 5/29/2025    Therapy Diagnosis:   Encounter Diagnosis   Name Primary?    Decreased range of motion of lumbar spine Yes     Physician: Tsering Franks NP    Physician Orders: Eval and Treat  Medical Diagnosis: Lumbosacral spondylosis without myelopathy  Primary osteoarthritis of right hip  Sacroiliac joint pain    Visit # / Visits Authorized:  12/20    Insurance Authorization Period: 4/14/2025 to 6/27/2025  Date of Evaluation: 2/27/2025  Plan of Care Certification: 5/29/2025 to 7/21/25     Time In: 0850   Time Out: 0950  Total Time: 60    Total Billable Time: 60     Intake Outcome Measure for FOTO Survey    Therapist reviewed FOTO scores for Zeeshan Patel on 5/29/2025.   FOTO report - see Media section or FOTO account episode details.     Intake Score:  %         Subjective  Pt states he is going to a MD appt today with a neurosurgeon     Pain level = 4/10  Pain     Patient reports a current pain level of 4/10.                    Patient reports pain is sharp and radiates from RLB around to anterior hip  Pt reports his BP is now under control and he is able to participate.  Prior Therapy: yes  Prior Treatment: injection, physical therapy   Social History:  lives with their spouse  Occupation:  for [employer] properties  Leisure: [employer]      Prior Level of Function: independent  Current Level of Function: independent  DME owned/used: none  Gym Membership: no  Past Medical History/Physical Systems Review:   Zeeshan Patel  has a past medical history of GERD (gastroesophageal reflux disease) and Ulcer.    Zeeshan Patel  has a past surgical history that includes Appendectomy; left shoulder; Wrist Arthroplasty; Epidural steroid injection (N/A, 11/19/2024); injection, sacroiliac joint (Right, 12/31/2024); and  Injection of anesthetic agent around nerve (Right, 2/19/2025).    Zeeshan has a current medication list which includes the following prescription(s): gabapentin, hydrochlorothiazide, losartan, metformin, methocarbamol, and pravastatin.    Review of patient's allergies indicates:  No Known Allergies     Objective          Isometric Testing on Med X equipment: Testing administered by PT    Test Initial Baseline Midpoint Final   Date 4/21/25 5/22/25    ROM 9-30 deg 6-36    Max Peak Torque 74  116    Min Peak Torque 47  81    Flex/Ext Ratio 1.5 1.4:1    % variance  normative data 72 56%    % change from initial test N/A visit 1 52%        REPEATED TEST MOVEMENTS:    Baseline symptoms:  Repeated Flexion in Standing worse   Repeated Extension in Standing worse   Repeated Flexion in lying worse   Repeated Extension in lying  worse     Outcomes:  Intake Score: 38%  Visit 6 Score: 40%  Visit 10 Score: 48%  Discharge Score:  Goal Score: 55%     Treatment:     Zeeshan received neuromuscular education  to isolate and engage spinal stabilization musculature correctly for motor control and coordination to aid in function and posture for 10 minutes on the Medical Medx Machine.  Patient performed MedX dynamic exercise with emphasis on spinal muscular control using pacer throughout  active range of motion. Therapist assisted patient in achieving optimal exertion for neural reeducation and endurance training by using the  Negrito Exertion Rating scale, by instructing the patient to aim for mid range of exertion, performing 15-20 repetitions, slowly, correctly,and safely.           5/29/2025     8:52 AM   [facility]   Visit Number 12   VAS Pain Rating 4   Lumbar Stretches - Slouch 10   Extension in Standing 10   Flexion in Lying 10   Flexion in Sitting 15   Lumbar Flexion 39   Lumbar Extension 3   Lumbar Weight 53 lbs   Repetitions 20   Rating of Perceived Exertion 5      Patient participated in therapeutic exercises to  develop strength, endurance, ROM, flexibility, posture, and core stabilization for 50minutes including:    LTR x 10  DKTC x 10 c/ swissball  Piriformis stretch 2 x 20 sec  ER stretch for hip 3 x 10 sec  HSS c/ strap 2x 20 sec sec  Supine sciatic nerve glide R LE x 10  Open book stretch x 10--NP  TrA + SLR x 15 with 2#--NP  Bridge + black tband x20  BKFO black Tband x 15  +Prone femoral nerve glide on R x 10  +Cat/cow x 10 followed by prayer stretch 2 x 10 sec  + LE bird dog x 10  SOC x 10--NP  Swissball flexion 5x fwd/side to side  Paloff press 15# + Lift x 10  EIS gentle x 10    Manual x 5 minutes to include R long long axis distraction, Lateral distraction and mobilization w/movement    Peripheral muscle strengthening which included one  set of 15-20 repetitions at a slow and controlled 10-13 second per rep pace focused on strengthening supporting musculature in order to improve body mechanics and functional mobility. Patient and therapist focused on proper form during treatment to ensure optimal strengthening of each targeted muscle group.  Machines utilized included:  torso rotation/hip abd/add/knee extension. Leg ext, leg curl bicep curl tricep ext, rows and chest press      Time Entry(in minutes):  Neuromuscular Re-Education Time Entry: 10  Therapeutic Exercise Time Entry: 50    Assessment & Plan    Zeeshan returns reporting some continued lower back and R hip pain which is rated as 4/10 and slightly improved overall. Treatment continued with flexibility, strengthening and neuromuscular reeducation exs. Added prone femoral nerve glide on (L), Cat/cow stretch, LE bird dog, lift with Paloff press. He was able to perform ex's with min cues and wihtout increased pain. ALso added (R) hip manual techniques which provide fair relief but he did appear guarded with Hip ER/IR movements. Lumbar MedX Flexion ROM was increased to 39 degrees, resistance was maintained at 53 ft/lbs and he completed 20 reps with a RPE = 5/10.  He completed peripheral strengthening ex's without c/o. Will continue per HB protocol and patient tolerance.     Plan: Continue PT towards established goals and plan of care    Patient's spiritual, cultural, and educational needs considered and patient agreeable to plan of care and goals.          GOALS: Pt is in agreement with the following goals.    Short term goals:  6 weeks or 10 visits   - Pt will demonstrate increased lumbar MedX ROM by at least 3 degrees from the initial ROM value with improvements noted in functional ROM and ability to perform ADLs. MET  - Pt will demonstrate increased MedX average isometric strength value by 20% from initial test resulting in improved ability to perform bending, lifting, and carrying activities safely, confidently.MET  - Pt will report a reduction in worst pain score by 1-2 points for improved tolerance for walking. Appropriate and Ongoing  - Pt able to perform HEP correctly with minimal cueing or supervision from therapist to encourage independent management of symptoms. Appropriate and Ongoing    Long term goals: 10 weeks or 20 visits   - Pt will demonstrate increased lumbar MedX ROM by at least 9 degrees from initial ROM value, resulting in improved ability to perform functional forward bending while standing and sitting. MET  - Pt will demonstrate increased MedX average isometric strength value by 40% from initial test resulting in improved ability to perform bending, lifting, and carrying activities safely and confidently. MET  - Pt to demonstrate ability to independently control and reduce their pain through posture positioning and mechanical movements throughout a typical day. Appropriate and Ongoing  - Pt will demonstrate reduced pain and improved functional outcomes as reported on the FOTO by reaching an intake score of >/= 55% functional ability in order to demonstrate subjective improvement in patient's condition. . Appropriate and Ongoing  - Pt will demonstrate  independence with the HEP at discharge. Appropriate and Ongoing      Zaheer Bob, PTA    5/29/2025

## 2025-06-03 ENCOUNTER — CLINICAL SUPPORT (OUTPATIENT)
Dept: REHABILITATION | Facility: HOSPITAL | Age: 55
End: 2025-06-03
Payer: COMMERCIAL

## 2025-06-03 DIAGNOSIS — M53.86 DECREASED RANGE OF MOTION OF LUMBAR SPINE: Primary | ICD-10-CM

## 2025-06-03 PROCEDURE — 97112 NEUROMUSCULAR REEDUCATION: CPT | Mod: CQ

## 2025-06-03 PROCEDURE — 97110 THERAPEUTIC EXERCISES: CPT | Mod: CQ

## 2025-06-04 ENCOUNTER — OFFICE VISIT (OUTPATIENT)
Dept: NEUROSURGERY | Facility: CLINIC | Age: 55
End: 2025-06-04
Attending: STUDENT IN AN ORGANIZED HEALTH CARE EDUCATION/TRAINING PROGRAM
Payer: COMMERCIAL

## 2025-06-04 VITALS
HEART RATE: 98 BPM | HEIGHT: 64 IN | SYSTOLIC BLOOD PRESSURE: 146 MMHG | WEIGHT: 235.88 LBS | BODY MASS INDEX: 40.27 KG/M2 | DIASTOLIC BLOOD PRESSURE: 85 MMHG

## 2025-06-04 DIAGNOSIS — M47.816 LUMBAR SPONDYLOSIS: ICD-10-CM

## 2025-06-04 PROCEDURE — 99203 OFFICE O/P NEW LOW 30 MIN: CPT | Mod: S$GLB,,, | Performed by: STUDENT IN AN ORGANIZED HEALTH CARE EDUCATION/TRAINING PROGRAM

## 2025-06-04 RX ORDER — AMLODIPINE BESYLATE 5 MG/1
TABLET ORAL
COMMUNITY
Start: 2025-02-10

## 2025-06-04 RX ORDER — PHENOL/SODIUM PHENOLATE
AEROSOL, SPRAY (ML) MUCOUS MEMBRANE
COMMUNITY

## 2025-06-05 ENCOUNTER — CLINICAL SUPPORT (OUTPATIENT)
Dept: REHABILITATION | Facility: HOSPITAL | Age: 55
End: 2025-06-05
Payer: COMMERCIAL

## 2025-06-05 DIAGNOSIS — M53.86 DECREASED RANGE OF MOTION OF LUMBAR SPINE: Primary | ICD-10-CM

## 2025-06-05 PROCEDURE — 97112 NEUROMUSCULAR REEDUCATION: CPT

## 2025-06-05 PROCEDURE — 97110 THERAPEUTIC EXERCISES: CPT

## 2025-06-06 ENCOUNTER — OFFICE VISIT (OUTPATIENT)
Dept: PAIN MEDICINE | Facility: CLINIC | Age: 55
End: 2025-06-06
Payer: COMMERCIAL

## 2025-06-06 VITALS
DIASTOLIC BLOOD PRESSURE: 82 MMHG | BODY MASS INDEX: 40.54 KG/M2 | HEIGHT: 64 IN | HEART RATE: 78 BPM | OXYGEN SATURATION: 96 % | WEIGHT: 237.44 LBS | SYSTOLIC BLOOD PRESSURE: 118 MMHG | TEMPERATURE: 98 F | RESPIRATION RATE: 19 BRPM

## 2025-06-06 DIAGNOSIS — M79.18 BUTTOCK PAIN: ICD-10-CM
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
## 2025-06-06 PROCEDURE — 99999 PR PBB SHADOW E&M-EST. PATIENT-LVL III: CPT | Mod: PBBFAC,,, | Performed by: ANESTHESIOLOGY

## 2025-06-19 ENCOUNTER — CLINICAL SUPPORT (OUTPATIENT)
Dept: REHABILITATION | Facility: HOSPITAL | Age: 55
End: 2025-06-19
Payer: COMMERCIAL

## 2025-06-19 DIAGNOSIS — M53.86 DECREASED RANGE OF MOTION OF LUMBAR SPINE: Primary | ICD-10-CM

## 2025-06-19 PROCEDURE — 97110 THERAPEUTIC EXERCISES: CPT

## 2025-06-19 PROCEDURE — 97112 NEUROMUSCULAR REEDUCATION: CPT

## 2025-06-19 NOTE — PROGRESS NOTES
Outpatient Rehab    Physical Therapy Progress Note : Updated Plan of Care        Patient Name: Zeeshan Patel  MRN: [id number]  YOB: 1970  Encounter Date: 6/19/2025    Therapy Diagnosis:   Encounter Diagnosis   Name Primary?    Decreased range of motion of lumbar spine Yes     Physician: Tsering Franks NP    Physician Orders: Eval and Treat  Medical Diagnosis: Lumbosacral spondylosis without myelopathy  Primary osteoarthritis of right hip  Sacroiliac joint pain    Visit # / Visits Authorized:  15/20    Insurance Authorization Period: 4/14/2025 to 6/27/2025  Date of Evaluation: 2/27/2025  Plan of Care Certification: 6/19/2025 to 7/21/25     Time In:   9  Time Out:  10  Total Time:    60  Total Billable Time:   55    Intake Outcome Measure for FOTO Survey    Therapist reviewed FOTO scores for Zeeshan Patel on 6/19/2025.   FOTO report - see Media section or FOTO account episode details.     Intake Score:  %         Subjective   Patient reports he has improved overall despite still having some pain in the R hip/LB     Pain level = 5/10  Pain     Patient reports a current pain level of 5/10.                 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    Zeeshan Patel  has a past surgical history that includes Appendectomy; left shoulder; Wrist Arthroplasty; Epidural steroid injection (N/A, 11/19/2024); injection, sacroiliac joint (Right, 12/31/2024); and Injection of anesthetic agent around nerve (Right, 2/19/2025).    Zeeshan has a current medication list which includes the  following prescription(s): amlodipine, gabapentin, hydrochlorothiazide, losartan, metformin, methocarbamol, omeprazole, and pravastatin.    Review of patient's allergies indicates:  No Known Allergies     Objective          Isometric Testing on Med X equipment: Testing administered by PT    Test Initial Baseline Midpoint Final   Date 4/21/25 5/22/25    ROM 9-30 deg 6-36    Max Peak Torque 74  116    Min Peak Torque 47  81    Flex/Ext Ratio 1.5 1.4:1    % variance  normative data 72 56%    % change from initial test N/A visit 1 52%        REPEATED TEST MOVEMENTS:    Baseline symptoms:  Repeated Flexion in Standing worse   Repeated Extension in Standing worse   Repeated Flexion in lying worse   Repeated Extension in lying  worse     Outcomes:  Intake Score: 38%  Visit 6 Score: 40%  Visit 10 Score: 48%  Discharge Score:  Goal Score: 55%     Treatment:     Zeeshan received neuromuscular education  to isolate and engage spinal stabilization musculature correctly for motor control and coordination to aid in function and posture for 10 minutes on the Medical Medx Machine.  Patient performed MedX dynamic exercise with emphasis on spinal muscular control using pacer throughout  active range of motion. Therapist assisted patient in achieving optimal exertion for neural reeducation and endurance training by using the  Negrito Exertion Rating scale, by instructing the patient to aim for mid range of exertion, performing 15-20 repetitions, slowly, correctly,and safely.             6/19/2025     9:26 AM   [facility]   Visit Number 15   VAS Pain Rating 5   Treadmill Time (in min.) 5 min   Extension in Standing 10   Flexion in Lying 10   Flexion in Sitting 15   Lumbar Weight 60 lbs   Repetitions 15   Rating of Perceived Exertion 4   Ice - Z Lie (in min.) 5         Patient participated in therapeutic exercises to develop strength, endurance, ROM, flexibility, posture, and core stabilization for 50 minutes including:    LTR x  10  DKTC x 10 c/ swissball  Piriformis stretch 2 x 20 sec  ER stretch for hip 3 x 10 sec  HSS c/ strap 2x 20 sec sec  Supine sciatic nerve glide R LE x 10  Bridge + black tband x20  BKFO black Tband x 15  Prone femoral nerve glide on R x 10  Cat/cow x 10 followed by prayer stretch 2 x 10 sec  LE bird dog x 10  SOC x 10--NP  Swissball flexion 5x fwd/side to side  Paloff press 15# + Lift x 10  Cable cross chops with short bar 20# x 10, lifts with tricep rope10# x 10  EIS gentle x 10  RLE long axis distraction         Peripheral muscle strengthening which included one  set of 15-20 repetitions at a slow and controlled 10-13 second per rep pace focused on strengthening supporting musculature in order to improve body mechanics and functional mobility. Patient and therapist focused on proper form during treatment to ensure optimal strengthening of each targeted muscle group.  Machines utilized included:  torso rotation/hip abd/add/knee extension. Leg ext, leg curl bicep curl tricep ext, rows and chest press      Time Entry(in minutes):  Neuromuscular Re-Education Time Entry: 10  Therapeutic Exercise Time Entry: 50    Assessment & Plan    Zeeshan returns with 5/10 pain level. Treatment continued with flexibility, strengthening and neuromuscular reeducation exs. He was able to perform ex's with min cues and wihtout increased pain.  Lumbar MedX resistance was increased to 60 ft/lbs and he completed 15 reps with a RPE = 4/10. He completed peripheral strengthening ex's without c/o. Will continue per HB protocol and patient tolerance.     Plan: Continue PT towards established goals and plan of care    Patient's spiritual, cultural, and educational needs considered and patient agreeable to plan of care and goals.          GOALS: Pt is in agreement with the following goals.    Short term goals:  6 weeks or 10 visits   - Pt will demonstrate increased lumbar MedX ROM by at least 3 degrees from the initial ROM value with improvements  noted in functional ROM and ability to perform ADLs. MET  - Pt will demonstrate increased MedX average isometric strength value by 20% from initial test resulting in improved ability to perform bending, lifting, and carrying activities safely, confidently.MET  - Pt will report a reduction in worst pain score by 1-2 points for improved tolerance for walking. Appropriate and Ongoing  - Pt able to perform HEP correctly with minimal cueing or supervision from therapist to encourage independent management of symptoms. Appropriate and Ongoing    Long term goals: 10 weeks or 20 visits   - Pt will demonstrate increased lumbar MedX ROM by at least 9 degrees from initial ROM value, resulting in improved ability to perform functional forward bending while standing and sitting. MET  - Pt will demonstrate increased MedX average isometric strength value by 40% from initial test resulting in improved ability to perform bending, lifting, and carrying activities safely and confidently. MET  - Pt to demonstrate ability to independently control and reduce their pain through posture positioning and mechanical movements throughout a typical day. Appropriate and Ongoing  - Pt will demonstrate reduced pain and improved functional outcomes as reported on the FOTO by reaching an intake score of >/= 55% functional ability in order to demonstrate subjective improvement in patient's condition. . Appropriate and Ongoing  - Pt will demonstrate independence with the HEP at discharge. Appropriate and Ongoing      Grisel Nieto, PT    6/19/2025

## 2025-06-30 ENCOUNTER — CLINICAL SUPPORT (OUTPATIENT)
Dept: REHABILITATION | Facility: HOSPITAL | Age: 55
End: 2025-06-30
Payer: COMMERCIAL

## 2025-06-30 DIAGNOSIS — M53.86 DECREASED RANGE OF MOTION OF LUMBAR SPINE: Primary | ICD-10-CM

## 2025-06-30 PROCEDURE — 97112 NEUROMUSCULAR REEDUCATION: CPT

## 2025-06-30 PROCEDURE — 97110 THERAPEUTIC EXERCISES: CPT

## 2025-06-30 NOTE — PROGRESS NOTES
Outpatient Rehab    Physical Therapy Progress Note : Updated Plan of Care        Patient Name: Zeeshan Patel  MRN: [id number]  YOB: 1970  Encounter Date: 6/30/2025    Therapy Diagnosis:   Encounter Diagnosis   Name Primary?    Decreased range of motion of lumbar spine Yes       Physician: Tsering Franks NP    Physician Orders: Eval and Treat  Medical Diagnosis: Lumbosacral spondylosis without myelopathy  Primary osteoarthritis of right hip  Sacroiliac joint pain    Visit # / Visits Authorized:  16/20    Insurance Authorization Period: 4/14/2025 to 6/27/2025  Date of Evaluation: 2/27/2025  Plan of Care Certification: 6/30/2025 to 7/21/25     Time In:   8  Time Out:  9  Total Time:    60  Total Billable Time:   55    Intake Outcome Measure for FOTO Survey    Therapist reviewed FOTO scores for Zeeshan Patel on 6/30/2025.   FOTO report - see Media section or FOTO account episode details.     Intake Score:  %         Subjective   Patient reports he has improved overall despite still having some pain in the R hip/LB     Pain level = 5/10  Pain     Patient reports a current pain level of 6/10.                   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    Zeeshan Patel  has a past surgical history that includes Appendectomy; left shoulder; Wrist Arthroplasty; Epidural steroid injection (N/A, 11/19/2024); injection, sacroiliac joint (Right, 12/31/2024); and Injection of anesthetic agent around nerve (Right, 2/19/2025).    Zeeshan has a current medication list which includes the  following prescription(s): amlodipine, gabapentin, hydrochlorothiazide, losartan, metformin, methocarbamol, omeprazole, and pravastatin.    Review of patient's allergies indicates:  No Known Allergies     Objective          Isometric Testing on Med X equipment: Testing administered by PT    Test Initial Baseline Midpoint Final   Date 4/21/25 5/22/25    ROM 9-30 deg 6-36    Max Peak Torque 74  116    Min Peak Torque 47  81    Flex/Ext Ratio 1.5 1.4:1    % variance  normative data 72 56%    % change from initial test N/A visit 1 52%        REPEATED TEST MOVEMENTS:    Baseline symptoms:  Repeated Flexion in Standing worse   Repeated Extension in Standing worse   Repeated Flexion in lying worse   Repeated Extension in lying  worse     Outcomes:  Intake Score: 38%  Visit 6 Score: 40%  Visit 10 Score: 48%  Discharge Score:  Goal Score: 55%     Treatment:     Zeeshan received neuromuscular education  to isolate and engage spinal stabilization musculature correctly for motor control and coordination to aid in function and posture for 10 minutes on the Medical Medx Machine.  Patient performed MedX dynamic exercise with emphasis on spinal muscular control using pacer throughout  active range of motion. Therapist assisted patient in achieving optimal exertion for neural reeducation and endurance training by using the  Negrito Exertion Rating scale, by instructing the patient to aim for mid range of exertion, performing 15-20 repetitions, slowly, correctly,and safely.           6/30/2025     8:16 AM   [facility]   Visit Number 16   VAS Pain Rating 6   Treadmill Time (in min.) 5 min   Extension in Standing 10   Flexion in Lying 10   Flexion in Sitting 15   Manual Therapy 5 mins.   Lumbar Weight 60 lbs   Repetitions 18   Rating of Perceived Exertion 4   Ice - Z Lie (in min.) 5           Patient participated in therapeutic exercises to develop strength, endurance, ROM, flexibility, posture, and core stabilization for 50  minutes including:    LTR x 10  DKTC x 10 c/ swissball  Piriformis stretch 2 x 20 sec  ER stretch for hip 3 x 10 sec  HSS c/ strap 2x 20 sec sec  Supine sciatic nerve glide R LE x 10  Bridge + black tband x20  BKFO black Tband x 15  Prone femoral nerve glide on R x 10  Cat/cow x 10 followed by prayer stretch 2 x 10 sec  LE bird dog x 10  SOC x 10--NP  Swissball flexion 5x fwd/side to side  Paloff press 15# + Lift x 10  Cable cross chops with short bar 20# x 10, lifts with tricep rope10# x 10  EIS gentle x 10  RLE long axis distraction     Manual: 5 min DTM c/ cups to R LB region    Peripheral muscle strengthening which included one  set of 15-20 repetitions at a slow and controlled 10-13 second per rep pace focused on strengthening supporting musculature in order to improve body mechanics and functional mobility. Patient and therapist focused on proper form during treatment to ensure optimal strengthening of each targeted muscle group.  Machines utilized included:  torso rotation/hip abd/add/knee extension. Leg ext, leg curl bicep curl tricep ext, rows and chest press      Time Entry(in minutes):  Neuromuscular Re-Education Time Entry: 10  Therapeutic Exercise Time Entry: 50    Assessment & Plan    Zeeshan returns with 6/10 pain level.  Pt reports some increased soreness on the R paraspinals after coughing when he had the Flu last week.  Aded DTM with cups x 5 minutes to increase blood flow and lossen musculature. Treatment continued with flexibility, strengthening and neuromuscular reeducation exs.   Lumbar MedX resistance was maintained at 60 ft/lbs and he completed 18reps with a RPE = 4/10. He completed peripheral strengthening ex's without c/o. Will continue per HB protocol and patient tolerance.     Plan: Continue PT towards established goals and plan of care    Patient's spiritual, cultural, and educational needs considered and patient agreeable to plan of care and goals.          GOALS: Pt is in agreement with  the following goals.    Short term goals:  6 weeks or 10 visits   - Pt will demonstrate increased lumbar MedX ROM by at least 3 degrees from the initial ROM value with improvements noted in functional ROM and ability to perform ADLs. MET  - Pt will demonstrate increased MedX average isometric strength value by 20% from initial test resulting in improved ability to perform bending, lifting, and carrying activities safely, confidently.MET  - Pt will report a reduction in worst pain score by 1-2 points for improved tolerance for walking. Appropriate and Ongoing  - Pt able to perform HEP correctly with minimal cueing or supervision from therapist to encourage independent management of symptoms. Appropriate and Ongoing    Long term goals: 10 weeks or 20 visits   - Pt will demonstrate increased lumbar MedX ROM by at least 9 degrees from initial ROM value, resulting in improved ability to perform functional forward bending while standing and sitting. MET  - Pt will demonstrate increased MedX average isometric strength value by 40% from initial test resulting in improved ability to perform bending, lifting, and carrying activities safely and confidently. MET  - Pt to demonstrate ability to independently control and reduce their pain through posture positioning and mechanical movements throughout a typical day. Appropriate and Ongoing  - Pt will demonstrate reduced pain and improved functional outcomes as reported on the FOTO by reaching an intake score of >/= 55% functional ability in order to demonstrate subjective improvement in patient's condition. . Appropriate and Ongoing  - Pt will demonstrate independence with the HEP at discharge. Appropriate and Ongoing      Grisel Nieto, PT    6/30/2025

## 2025-07-09 ENCOUNTER — OFFICE VISIT (OUTPATIENT)
Dept: PAIN MEDICINE | Facility: CLINIC | Age: 55
End: 2025-07-09
Payer: MEDICAID

## 2025-07-09 VITALS
BODY MASS INDEX: 40.19 KG/M2 | WEIGHT: 235.44 LBS | SYSTOLIC BLOOD PRESSURE: 136 MMHG | HEART RATE: 91 BPM | OXYGEN SATURATION: 96 % | TEMPERATURE: 97 F | RESPIRATION RATE: 18 BRPM | DIASTOLIC BLOOD PRESSURE: 85 MMHG | HEIGHT: 64 IN

## 2025-07-09 DIAGNOSIS — G57.01 PIRIFORMIS SYNDROME OF RIGHT SIDE: ICD-10-CM

## 2025-07-09 DIAGNOSIS — M25.551 PAIN OF RIGHT HIP: Primary | ICD-10-CM

## 2025-07-09 DIAGNOSIS — M70.61 GREATER TROCHANTERIC BURSITIS OF RIGHT HIP: ICD-10-CM

## 2025-07-09 PROCEDURE — 99214 OFFICE O/P EST MOD 30 MIN: CPT | Mod: S$PBB,,, | Performed by: ANESTHESIOLOGY

## 2025-07-09 PROCEDURE — 3008F BODY MASS INDEX DOCD: CPT | Mod: CPTII,,, | Performed by: ANESTHESIOLOGY

## 2025-07-09 PROCEDURE — 4010F ACE/ARB THERAPY RXD/TAKEN: CPT | Mod: CPTII,,, | Performed by: ANESTHESIOLOGY

## 2025-07-09 PROCEDURE — 3079F DIAST BP 80-89 MM HG: CPT | Mod: CPTII,,, | Performed by: ANESTHESIOLOGY

## 2025-07-09 PROCEDURE — 3075F SYST BP GE 130 - 139MM HG: CPT | Mod: CPTII,,, | Performed by: ANESTHESIOLOGY

## 2025-07-09 PROCEDURE — 1159F MED LIST DOCD IN RCRD: CPT | Mod: CPTII,,, | Performed by: ANESTHESIOLOGY

## 2025-07-09 PROCEDURE — 99215 OFFICE O/P EST HI 40 MIN: CPT | Mod: PBBFAC | Performed by: ANESTHESIOLOGY

## 2025-07-09 PROCEDURE — 99999 PR PBB SHADOW E&M-EST. PATIENT-LVL V: CPT | Mod: PBBFAC,,, | Performed by: ANESTHESIOLOGY

## 2025-07-09 NOTE — LETTER
July 9, 2025      [facility]  [address]  [facility] [address]  Phone: [phone]  Fax: [phone]       Patient: Zeeshan Patel   YOB: 1970  Date of Visit: 07/09/2025    To Whom It May Concern:    Dedrick Patel  was at [facility] on 07/09/2025. The patient may return to work/school on 7/9/2025 with no restrictions. If you have any questions or concerns, or if I can be of further assistance, please do not hesitate to contact me.    Sincerely,    Amanda Lewis MD

## 2025-07-09 NOTE — PROGRESS NOTES
Interventional Pain Management - Established Visit  Follow-Up     PCP: Andrzej Andujar MD    REFERRING PHYSICIAN: No ref. provider found    CHIEF COMPLAINT: lower back pain after work injury    Original HISTORY OF PRESENT ILLNESS: Zeeshan Patel presents to the clinic for the evaluation of the above pain. The pain started July 31st after a fall at work while carrying a washing machine with co-workers. The machine fell backwards onto him.     Original Pain Description:  The pain is located in the lower back and right thigh. The pain is described as aching, sharp, shooting, throbbing, and tingling. Exacerbating factors: Sitting, Standing, Bending, Walking, Extension, Flexing, and Lifting. Mitigating factors heat, laying down, massage, medications, physical therapy, and rest. Symptoms interfere with daily activity, sleeping, and work. He states that he can not sleep on his right side due to pain but can on his left. He has been getting nearly 6 hours of sleep a night since the accident and needs to put a pillow in between his legs for comfort. The patient feels like symptoms have been worsening. Patient denies night fever/night sweats, urinary incontinence, bowel incontinence, significant weight loss, significant motor weakness, and loss of sensations.    Original PAIN SCORES:  Best: Pain is 5  Worst: Pain is 9  Current: Pain is 7        7/9/2025     9:52 AM   Last 3 PDI Scores   Pain Disability Index (PDI) 42       INTERVAL HISTORY: (Newest visit at the bottom)   Interval History (12/9/2024):    [id number] utilized during today's visit: Zeeshan Patel returns to clinic for follow-up after L5/S1 ILESI to the right on 11/19/2024. He reports 50% relief of back pain. He continues with right lower back pain and right lateral hip pain. The pain in the lower back is worse with walking, sitting, standing. He has taken Celebrex and Flexeril, however, these caused stomach upset. He has a history  of gastric ulcer. He discontinued these. He is still seeing  and will be starting PT again as this provided good relief. He denies recent health changes. He denies recent falls or trauma. He denies new onset fever/night sweats, urinary incontinence, bowel incontinence, significant weight changes, significant motor weakness or changes, or loss of sensations. His pain today is 4/10.      Interval History 1/15/2025:   code: [id number]: Zeeshan Patel returns to follow-up after right SI joint and right piriformis injection on 12/31/2024. He reports 60% relief without activity. He continues with lower back pain without radiation. The pain is worse with walking, standing and sitting. He continues physical therapy three times per week. He continues with home exercises. He continues ibuprofen and methocarbamol with good relief. He is still working with . Nila, his  representative, is present via phone call during his visit. He denies recent health changes. He denies recent falls or trauma. He denies new onset fever/night sweats, urinary incontinence, bowel incontinence, significant weight changes, significant motor weakness or changes, or loss of sensations. His pain today is 2/10. With activity 5-6/10.    Interval History 2/3/2025:  Zeeshan Patel returns for follow-up for imaging review.     Nila Zhao, nurse  with  is present during the visit. Cannot climb stairs due to hip pain. PT has not been helpful. He states the sessions have not been challenging and he reports massage and massage gun with limited exercise or strengthening during sessions. He denies recent health changes. He denies recent falls or trauma. He denies new onset fever/night sweats, urinary incontinence, bowel incontinence, significant weight changes, significant motor weakness or changes, or loss of sensations. His pain today is 4/10.  With activity, the pain is 6/10.    Interval History  3/12/2025:  Zeeshan Patel returns to clinic for follow-up after right hip joint and right GTB injection on 2/19/2025. He reports limited pain relief. He continues with right hip/lower back pain with walking and standing too long. Since his last office visit, he had a long trip to [place]. He has had increased pain to the tailbone and his right buttock. The buttock pain is worse with walking. He was recently diagnosed with sleep apnea. He has been having issues with increased blood pressure. He had his initial evaluation with [facility], however, he had increased blood pressure during the session and so they wanted him to be evaluated for his blood pressure and for him to transition to [facility]. He denies recent falls or trauma. He denies new onset fever/night sweats, urinary incontinence, bowel incontinence, significant weight changes, significant motor weakness or changes, or loss of sensations. His pain today is 7/10.      Interval History 4/7/25: Zeeshan Patel returns for follow up. At our last visit Mr. Patel was having ongoing low back pain. Tsering trialed him on Methocarbamol and requested an MRI prior to follow up. He was also referred to ortho for ongoing pain despite GTB and Hip injection. He presents today noting that the MRI was denied and he was not contacted by orthopaedics. Today, he continues to have right low back pain worse with sitting too long or standing too long. With prolonged sitting the pain radiates into the right buttock and posterior right thigh. Pain continues to be 7-9/10 in the right low back.     Interval History 6/6/2025: Zeeshan Patel returns for follow up of low back and hip pain radiating down his right leg.  Besides improvement in pain, characteristics that patient's pain has not changed.  At our last visit, patient was instructed to return to the [facility] program and he was continued on Robaxin 500 prn and ibuprofen prn. He has also  "been seen by sleep and is having his MANPREET treated. Today, patient reports pain has improved to 4/10.  Patient is engaged in HEP.  Patient has discontinued Robaxin and is now using ibuprofen/gabapentin QHS with good relief of symptoms. No red flags.  Patient evaluated by Dr. Esteves on 04/06, no surgical options, cleared from their perspective.    Interval History 7/9/25  Patient was last seen a month ago and that time it was revealed that workmans comp denied his MRI. Patient has been enrolled in the [facility] therapy program twice weekly. Patient reported that he reduced gabapentin from three pills back to two pills because it makes him too drowsy otherwise. Patient reports he has to take gabapentin and ibuprofen before his PT Reports his pain is about a 5/10 on average.  Patient reports he is doing his HEP. He says he tries to walk and ride his bike. He says he does about 1.5 to 2 miles on a walk before his pain starts to kick in. He reports that to return to work he needs to lift up to 50 lbs and he is not able to return to work with just 25 lbs for "light duty". Patient had to buy slip on shoes as he is unable to lean over to tie his shoes and still wears slip on shoes. 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 He also struggles with prolonged standing. Patient has also had difficulty engaging in sexual intercourse due to his back pain. Patient reports the ibuprofen and the gabapentin help him manage his pain. He only takes 800 mg of ibuprofen a day.     6 weeks of Conservative therapy:  PT: 8/5/24 to current date (three times a week), Completed   HEP: 3-4 X / weekly    Treatments / Medications: (Ice/Heat/NSAIDS/APAP/etc):  Ibuprofen-moderate relief  Gabapentin- moderate relief  Methocarbamol -minimal relief      Interventional Pain Procedures: (Previous injections)  11/19/2024 - L5/S1 ILESI (to the right) - 50% " relief of back pain x 2 weeks  12/31/2024 - right SI joint and right piriformis - 60% relief for 3 weeks  2/19/2025 - right hip and right GTB - limited relief for 5 days    Past Medical History:   Diagnosis Date    GERD (gastroesophageal reflux disease)     Ulcer      Past Surgical History:   Procedure Laterality Date    APPENDECTOMY      EPIDURAL STEROID INJECTION N/A 11/19/2024    Procedure: LUMBAR L5/S1 FABY (TO THE RIGHT);  Surgeon: Amanda Lewis MD;  Location: [facility];  Service: Pain Management;  Laterality: N/A;  [phone]  2 WK F/U KELSI    INJECTION OF ANESTHETIC AGENT AROUND NERVE Right 2/19/2025    Procedure: INJECTION, RIGHT HIP AND RIGHT GTB;  Surgeon: Amanda Lewis MD;  Location: [facility];  Service: Pain Management;  Laterality: Right;  2 WK F/U NIKO    INJECTION, SACROILIAC JOINT Right 12/31/2024    Procedure: INJECTION,SACROILIAC JOINT RIGHT AND RIGHT PIRIFORMIS;  Surgeon: Amanda Lewis MD;  Location: [facility];  Service: Pain Management;  Laterality: Right;  3 WK F/U EVIE    left shoulder      WRIST ARTHROPLASTY       Social History     Socioeconomic History    Marital status:    Tobacco Use    Smoking status: Every Day    Smokeless tobacco: Never   Substance and Sexual Activity    Alcohol use: No    Drug use: No     Social Drivers of Health     Financial Resource Strain: Medium Risk (5/15/2025)    Overall Financial Resource Strain (CARDIA)     Difficulty of Paying Living Expenses: Somewhat hard   Food Insecurity: Food Insecurity Present (5/15/2025)    Hunger Vital Sign     Worried About Running Out of Food in the Last Year: Sometimes true     Ran Out of Food in the Last Year: Sometimes true   Transportation Needs: Unknown (5/15/2025)    PRAPARE - Transportation     Lack of Transportation (Medical): No     Lack of Transportation (Non-Medical): Patient declined   Physical Activity: Insufficiently Active (5/15/2025)    Exercise Vital Sign     Days of Exercise per Week:  3 days     Minutes of Exercise per Session: 40 min   Stress: Stress Concern Present (5/15/2025)    [employer] - Occupational Stress Questionnaire     Feeling of Stress : To some extent   Housing Stability: Unknown (5/15/2025)    Housing Stability Vital Sign     Unable to Pay for Housing in the Last Year: No     Number of Times Moved in the Last Year: 0     Homeless in the Last Year: Patient declined     Family History   Problem Relation Name Age of Onset    Cancer Mother      Kidney disease Mother      Hypertension Mother      Hypertension Father      Hypertension Maternal Aunt thyroid cancer     Diabetes Maternal Grandfather colon        Review of patient's allergies indicates:  No Known Allergies    Current Outpatient Medications   Medication Sig    amLODIPine (NORVASC) 5 MG tablet 1 tablet Orally Once a day for 90 days  to start at 1/2 tablet x 2 weeks    gabapentin (NEURONTIN) 100 MG capsule Take 1 capsule (100 mg total) by mouth 3 (three) times daily.    hydroCHLOROthiazide 12.5 MG Tab Take 12.5 mg by mouth once daily.    losartan (COZAAR) 50 MG tablet Take 50 mg by mouth once daily.    metFORMIN (GLUCOPHAGE-XR) 500 MG ER 24hr tablet     methocarbamoL (ROBAXIN) 500 MG Tab Take 1 tablet (500 mg total) by mouth 3 (three) times daily as needed (spasm).    omeprazole 20 mg TbEC Omeprazole    pravastatin (PRAVACHOL) 40 MG tablet Take 40 mg by mouth.     No current facility-administered medications for this visit.       ROS:  GENERAL: No fever. No chills. No fatigue. Denies weight loss. Denies weight gain.  HEENT: Denies headaches. Denies vision change. Denies eye pain. Denies double vision. Denies ear pain.   CV: Denies chest pain.   PULM: Denies of shortness of breath.  GI: Denies constipation. No diarrhea. No abdominal pain. Denies nausea. Denies vomiting. No blood in stool.  HEME: Denies bleeding problems.  : Denies urgency. No painful urination. No blood in urine.  MS: Denies  "joint stiffness. Denies joint swelling.   SKIN: Denies rash.   NEURO: Denies seizures. No weakness.  PSYCH:  Denies difficulty sleeping. No anxiety. Denies depression. No suicidal thoughts.       VITALS:   Vitals:    07/09/25 0950   BP: 136/85   Pulse: 91   Resp: 18   Temp: 97 °F (36.1 °C)   TempSrc: Oral   SpO2: 96%   Weight: 106.8 kg (235 lb 7.2 oz)   Height: 5' 4" (1.626 m)   PainSc:   6   PainLoc: Back             PHYSICAL EXAM:   GENERAL: Well appearing, in no acute distress, alert and oriented x3.  PSYCH:  Mood and affect appropriate.  SKIN: Skin color, texture, turgor normal, no rashes or lesions.  HEENT:  Normocephalic, atraumatic. Cranial nerves grossly intact.  NECK: No pain to palpation over the cervical paraspinous muscles. No pain to palpation over facets. No pain with neck flexion, extension, or lateral flexion.   PULM: No evidence of respiratory difficulty, symmetric chest rise.  GI:  Non-distended  BACK:   Good ROM   Reproduced pain with flexion.    No midline tenderness to palpation.    Positive right slump test (reproduced pain)    +Pain over right SIJ (does not reproduce pain)  +LIZA/FADIR for both pain into groin and into the lateral/posterior hip  + Logroll  Tenderness to palpation over the right piriformis (reproduced pain).   + axial loadign lumbar spine  TTP along R GTB  + logroll for R hip pain   EXTREMITIES: No deformities, edema, or skin discoloration.   MUSCULOSKELETAL: No atrophy is noted.  NEURO: Sensation is equal and appropriate bilaterally. Bilateral upper and lower extremity strength is normal and symmetric. Negative babinski bilaterally   GAIT: normal.      LABS:  BUN/Cr: 13/0.9    IMAGING:    No new imaging      ASSESSMENT: 55 y.o. year old male with pain, consistent with:    Encounter Diagnoses   Name Primary?    Piriformis syndrome of right side     Chronic right hip pain Yes    Greater trochanteric bursitis of right hip     Lumbosacral radiculopathy          DISCUSSION: Zeeshan" Rishabh Patel is a English/Tongan-speaking man who comes to us after a fall. He was carrying a washing machine up some stairs and fell backwards with the machine landing on top of him.  He was experiencing ongoing radicular pain despite PT. Imaging shows L3/4 and 4/L5 bulge with mild to moderate bilateral neural foramina stenosis. Hip Xray is normal. Exam is significant for positive right straight leg raise and tenderness to palpation over the right piriformis and with right hip log roll. However, we have tried several injections with short-lived relief (ILESI, Right Hip, Right GTB, Right Piriformis, Right SIJ). He continues to have non-focal symptoms. He has had some improvement in [facility].     PLAN:   Reviewed [facility] notes.   Continue [facility] as he is making tangible improvements. They want him to continue to reach goal.   He is unable to return to light duty as his work requires him to tolerate up to 50 lbs of lifting. Encouraged to speak with employer to find opportunity for him to return with light duty   Reviewed previous hip XR. MRI L spine imaging report reviewed.   Exam concerning for intraarticular hip pathology (see above) so will order R hip MRI today  Continue gabapentin BID and ibuprofen prn with therapy as needed   Follow up in 1 month    The above plan and management options were discussed at length with patient. Patient is in agreement with the above and verbalized understanding.     Melva Santana MD  [facility]  PGY-5       WORK NOTE:   July 9, 2025      Patient: Zeeshan Patel   YOB: 1970  Date of Visit: 07/09/2025    To Whom It May Concern:    Dedrick Patel  was at [facility] on 07/09/2025. The patient may return to work/school on 7/9/2025 with restrictions--restrictions include limiting lifting up to 45 lbs limited kneeling, turning/twisting. If you have any questions or concerns, or if I can be of further assistance,  please do not hesitate to contact me.    Sincerely,    Amanda Lewis MD

## 2025-07-15 ENCOUNTER — HOSPITAL ENCOUNTER (OUTPATIENT)
Dept: RADIOLOGY | Facility: OTHER | Age: 55
Discharge: HOME | End: 2025-07-15
Attending: STUDENT IN AN ORGANIZED HEALTH CARE EDUCATION/TRAINING PROGRAM
Payer: COMMERCIAL

## 2025-07-15 DIAGNOSIS — M25.551 PAIN OF RIGHT HIP: ICD-10-CM

## 2025-07-15 PROCEDURE — 73721 MRI JNT OF LWR EXTRE W/O DYE: CPT | Mod: TC,RT

## 2025-07-15 PROCEDURE — 73721 MRI JNT OF LWR EXTRE W/O DYE: CPT | Mod: 26,RT,, | Performed by: RADIOLOGY

## 2025-07-17 ENCOUNTER — TELEPHONE (OUTPATIENT)
Dept: PAIN MEDICINE | Facility: CLINIC | Age: 55
End: 2025-07-17
Payer: MEDICAID

## 2025-07-17 NOTE — TELEPHONE ENCOUNTER
SPOKE WITH REP. AT [place] [employer]. ASKED FOR DISABILITY PAPERWORK TO BE RE-FAXED TO [facility] FOR PROVIDER TO REVIEW. CALL ENDED.    CURRENTLY AWAITING FAX.    Copied from CRM #[id number]. Topic: General Inquiry - Patient Advice  >> Jul 15, 2025  4:22 PM Fidelia wrote:  Type: Patient Call Back    Who called:ZAKIYA Murdock    What is the request in detail:Collette is requesting a call back to get a turn around time for disabiltiy paperwork that was faxed on 7/7. Fax number is [phone] REF # [id number]    Can the clinic reply by [facility]? No    Would the patient rather a call back or a response via [facility]? call    Best call back number:[phone] Ref# [id number]

## 2025-07-18 ENCOUNTER — TELEPHONE (OUTPATIENT)
Dept: PAIN MEDICINE | Facility: CLINIC | Age: 55
End: 2025-07-18
Payer: MEDICAID

## 2025-07-18 NOTE — TELEPHONE ENCOUNTER
Copied from CRM #[id number]. Topic: General Inquiry - Patient Advice  >> Jul 17, 2025  3:59 PM Fidelia wrote:  Type: Patient Call Back    Who called:Nila     What is the request in detail: Nila is requesting a call back to schedule pt appt.    Can the clinic reply by [facility]? No    Would the patient rather a call back or a response via [facility]? call    Best call back number:[phone]    Additional Information:

## 2025-07-24 ENCOUNTER — OFFICE VISIT (OUTPATIENT)
Dept: PAIN MEDICINE | Facility: CLINIC | Age: 55
End: 2025-07-24
Payer: COMMERCIAL

## 2025-07-24 VITALS
TEMPERATURE: 98 F | WEIGHT: 235.69 LBS | SYSTOLIC BLOOD PRESSURE: 137 MMHG | HEART RATE: 100 BPM | OXYGEN SATURATION: 95 % | DIASTOLIC BLOOD PRESSURE: 91 MMHG | BODY MASS INDEX: 40.24 KG/M2 | HEIGHT: 64 IN

## 2025-07-24 DIAGNOSIS — M25.551 PAIN OF RIGHT HIP: ICD-10-CM

## 2025-07-24 DIAGNOSIS — G89.29 CHRONIC BILATERAL LOW BACK PAIN WITHOUT SCIATICA: ICD-10-CM

## 2025-07-24 DIAGNOSIS — S73.191D TEAR OF RIGHT ACETABULAR LABRUM, SUBSEQUENT ENCOUNTER: Primary | ICD-10-CM
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
## 2025-07-24 DIAGNOSIS — M54.50 CHRONIC BILATERAL LOW BACK PAIN WITHOUT SCIATICA: ICD-10-CM

## 2025-07-24 PROCEDURE — 99214 OFFICE O/P EST MOD 30 MIN: CPT | Mod: S$GLB,,, | Performed by: ANESTHESIOLOGY

## 2025-07-24 PROCEDURE — 99999 PR PBB SHADOW E&M-EST. PATIENT-LVL V: CPT | Mod: PBBFAC,,, | Performed by: ANESTHESIOLOGY

## 2025-07-24 NOTE — PROGRESS NOTES
Interventional Pain Management - Established Visit  Follow-Up     PCP: Andrzej Andujar MD    REFERRING PHYSICIAN: No ref. provider found    CHIEF COMPLAINT: lower back pain after work injury    Original HISTORY OF PRESENT ILLNESS: Zeeshan Patel presents to the clinic for the evaluation of the above pain. The pain started July 31st after a fall at work while carrying a washing machine with co-workers. The machine fell backwards onto him.     Original Pain Description:  The pain is located in the lower back and right thigh. The pain is described as aching, sharp, shooting, throbbing, and tingling. Exacerbating factors: Sitting, Standing, Bending, Walking, Extension, Flexing, and Lifting. Mitigating factors heat, laying down, massage, medications, physical therapy, and rest. Symptoms interfere with daily activity, sleeping, and work. He states that he can not sleep on his right side due to pain but can on his left. He has been getting nearly 6 hours of sleep a night since the accident and needs to put a pillow in between his legs for comfort. The patient feels like symptoms have been worsening. Patient denies night fever/night sweats, urinary incontinence, bowel incontinence, significant weight loss, significant motor weakness, and loss of sensations.    Original PAIN SCORES:  Best: Pain is 5  Worst: Pain is 9  Current: Pain is 7        7/24/2025     1:42 PM   Last 3 PDI Scores   Pain Disability Index (PDI) 26       INTERVAL HISTORY: (Newest visit at the bottom)   Interval History (12/9/2024):    026622 utilized during today's visit: Zeeshan Patel returns to clinic for follow-up after L5/S1 ILESI to the right on 11/19/2024. He reports 50% relief of back pain. He continues with right lower back pain and right lateral hip pain. The pain in the lower back is worse with walking, sitting, standing. He has taken Celebrex and Flexeril, however, these caused stomach upset. He has a  history of gastric ulcer. He discontinued these. He is still seeing  and will be starting PT again as this provided good relief. He denies recent health changes. He denies recent falls or trauma. He denies new onset fever/night sweats, urinary incontinence, bowel incontinence, significant weight changes, significant motor weakness or changes, or loss of sensations. His pain today is 4/10.      Interval History 1/15/2025:   code: [id number]: Zeeshan Patel returns to follow-up after right SI joint and right piriformis injection on 12/31/2024. He reports 60% relief without activity. He continues with lower back pain without radiation. The pain is worse with walking, standing and sitting. He continues physical therapy three times per week. He continues with home exercises. He continues ibuprofen and methocarbamol with good relief. He is still working with . Nila, his  representative, is present via phone call during his visit. He denies recent health changes. He denies recent falls or trauma. He denies new onset fever/night sweats, urinary incontinence, bowel incontinence, significant weight changes, significant motor weakness or changes, or loss of sensations. His pain today is 2/10. With activity 5-6/10.    Interval History 2/3/2025:  Zeeshan Patel returns for follow-up for imaging review.     Nila Zhao, nurse  with  is present during the visit. Cannot climb stairs due to hip pain. PT has not been helpful. He states the sessions have not been challenging and he reports massage and massage gun with limited exercise or strengthening during sessions. He denies recent health changes. He denies recent falls or trauma. He denies new onset fever/night sweats, urinary incontinence, bowel incontinence, significant weight changes, significant motor weakness or changes, or loss of sensations. His pain today is 4/10.  With activity, the pain is 6/10.    Interval History  3/12/2025:  Zeeshan Patel returns to clinic for follow-up after right hip joint and right GTB injection on 2/19/2025. He reports limited pain relief. He continues with right hip/lower back pain with walking and standing too long. Since his last office visit, he had a long trip to [place]. He has had increased pain to the tailbone and his right buttock. The buttock pain is worse with walking. He was recently diagnosed with sleep apnea. He has been having issues with increased blood pressure. He had his initial evaluation with [facility], however, he had increased blood pressure during the session and so they wanted him to be evaluated for his blood pressure and for him to transition to [facility]. He denies recent falls or trauma. He denies new onset fever/night sweats, urinary incontinence, bowel incontinence, significant weight changes, significant motor weakness or changes, or loss of sensations. His pain today is 7/10.      Interval History 4/7/25: Zeeshan Patel returns for follow up. At our last visit Mr. Patel was having ongoing low back pain. Tsering trialed him on Methocarbamol and requested an MRI prior to follow up. He was also referred to ortho for ongoing pain despite GTB and Hip injection. He presents today noting that the MRI was denied and he was not contacted by orthopaedics. Today, he continues to have right low back pain worse with sitting too long or standing too long. With prolonged sitting the pain radiates into the right buttock and posterior right thigh. Pain continues to be 7-9/10 in the right low back.     Interval History 6/6/2025: Zeeshan Patel returns for follow up of low back and hip pain radiating down his right leg.  Besides improvement in pain, characteristics that patient's pain has not changed.  At our last visit, patient was instructed to return to the [facility] program and he was continued on Robaxin 500 prn and ibuprofen prn. He has also  "been seen by sleep and is having his MANPREET treated. Today, patient reports pain has improved to 4/10.  Patient is engaged in HEP.  Patient has discontinued Robaxin and is now using ibuprofen/gabapentin QHS with good relief of symptoms. No red flags.  Patient evaluated by Dr. Esteves on 04/06, no surgical options, cleared from their perspective.    Interval History 7/9/25  Patient was last seen a month ago and that time it was revealed that workmans comp denied his MRI. Patient has been enrolled in the healthy back therapy program twice weekly. Patient reported that he reduced gabapentin from three pills back to two pills because it makes him too drowsy otherwise. Patient reports he has to take gabapentin and ibuprofen before his PT Reports his pain is about a 5/10 on average.  Patient reports he is doing his HEP. He says he tries to walk and ride his bike. He says he does about 1.5 to 2 miles on a walk before his pain starts to kick in. He reports that to return to work he needs to lift up to 50 lbs and he is not able to return to work with just 25 lbs for "light duty". Patient had to buy slip on shoes as he is unable to lean over to tie his shoes and still wears slip on shoes. 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 He also struggles with prolonged standing. Patient has also had difficulty engaging in sexual intercourse due to his back pain. Patient reports the ibuprofen and the gabapentin help him manage his pain. He only takes 800 mg of ibuprofen a day.     Interval History 07/24/2025:  Zeeshan Patel returns to clinic for follow up of right hip pain to discuss MRI findings. His MRI of the right hip showed a tear in his superior labrum with adjacent chondral fissuring with mild subcortical cystic change. The hip pain is 6-7/10 and is daily. He had his last healthy back session 6/30 and notes it was helping, " but he needs more sessions approved. He was last cleared by therapy to lift 45 pounds, but his work still requires that even light duty lift 50 pounds so he has not been working. He notes his low back pain is intermittent with some days pain free, but the pain is worse with walking. Today he notes 50% improvement in his pain with therapy.      6 weeks of Conservative therapy:  PT: 8/5/24 to current date (three times a week), Completed. Currently in [facility]  HEP: 3-4 X / weekly    Treatments / Medications: (Ice/Heat/NSAIDS/APAP/etc):  Currently taking  Ibuprofen -moderate relief  Gabapentin 100 mg TID- moderate relief    Methocarbamol -minimal relief so stopped taking      Interventional Pain Procedures: (Previous injections)  11/19/2024 - L5/S1 ILESI (to the right) - 50% relief of back pain x 2 weeks  12/31/2024 - right SI joint and right piriformis - 60% relief for 3 weeks  2/19/2025 - right hip and right GTB - limited relief for 5 days    Past Medical History:   Diagnosis Date    GERD (gastroesophageal reflux disease)     Ulcer      Past Surgical History:   Procedure Laterality Date    APPENDECTOMY      EPIDURAL STEROID INJECTION N/A 11/19/2024    Procedure: LUMBAR L5/S1 FABY (TO THE RIGHT);  Surgeon: Amanda Lweis MD;  Location: [facility];  Service: Pain Management;  Laterality: N/A;  [phone]  2 WK F/U KELSI    INJECTION OF ANESTHETIC AGENT AROUND NERVE Right 2/19/2025    Procedure: INJECTION, RIGHT HIP AND RIGHT GTB;  Surgeon: Amanda Lewis MD;  Location: [facility];  Service: Pain Management;  Laterality: Right;  2 WK F/U NIKO    INJECTION, SACROILIAC JOINT Right 12/31/2024    Procedure: INJECTION,SACROILIAC JOINT RIGHT AND RIGHT PIRIFORMIS;  Surgeon: Amanda Lewis MD;  Location: [facility];  Service: Pain Management;  Laterality: Right;  3 WK F/U EVIE    left shoulder      WRIST ARTHROPLASTY       Social History     Socioeconomic History    Marital status:    Tobacco Use     Smoking status: Every Day    Smokeless tobacco: Never   Substance and Sexual Activity    Alcohol use: No    Drug use: No     Social Drivers of Health     Financial Resource Strain: Medium Risk (5/15/2025)    Overall Financial Resource Strain (CARDIA)     Difficulty of Paying Living Expenses: Somewhat hard   Food Insecurity: Food Insecurity Present (5/15/2025)    Hunger Vital Sign     Worried About Running Out of Food in the Last Year: Sometimes true     Ran Out of Food in the Last Year: Sometimes true   Transportation Needs: Unknown (5/15/2025)    PRAPARE - Transportation     Lack of Transportation (Medical): No     Lack of Transportation (Non-Medical): Patient declined   Physical Activity: Insufficiently Active (5/15/2025)    Exercise Vital Sign     Days of Exercise per Week: 3 days     Minutes of Exercise per Session: 40 min   Stress: Stress Concern Present (5/15/2025)    [employer] [place] [employer] - Occupational Stress Questionnaire     Feeling of Stress : To some extent   Housing Stability: Unknown (5/15/2025)    Housing Stability Vital Sign     Unable to Pay for Housing in the Last Year: No     Number of Times Moved in the Last Year: 0     Homeless in the Last Year: Patient declined     Family History   Problem Relation Name Age of Onset    Cancer Mother      Kidney disease Mother      Hypertension Mother      Hypertension Father      Hypertension Maternal Aunt thyroid cancer     Diabetes Maternal Grandfather colon        Review of patient's allergies indicates:  No Known Allergies    Current Outpatient Medications   Medication Sig    amLODIPine (NORVASC) 5 MG tablet 1 tablet Orally Once a day for 90 days  to start at 1/2 tablet x 2 weeks    gabapentin (NEURONTIN) 100 MG capsule Take 1 capsule (100 mg total) by mouth 3 (three) times daily.    hydroCHLOROthiazide 12.5 MG Tab Take 12.5 mg by mouth once daily.    losartan (COZAAR) 50 MG tablet Take 50 mg by mouth once daily.    metFORMIN  "(GLUCOPHAGE-XR) 500 MG ER 24hr tablet     methocarbamoL (ROBAXIN) 500 MG Tab Take 1 tablet (500 mg total) by mouth 3 (three) times daily as needed (spasm).    omeprazole 20 mg TbEC Omeprazole    pravastatin (PRAVACHOL) 40 MG tablet Take 40 mg by mouth.     No current facility-administered medications for this visit.       ROS:  GENERAL: No fever. No chills. No fatigue. Denies weight loss. Denies weight gain.  HEENT: Denies headaches. Denies vision change. Denies eye pain. Denies double vision. Denies ear pain.   CV: Denies chest pain.   PULM: Denies of shortness of breath.  GI: Denies constipation. No diarrhea. No abdominal pain. Denies nausea. Denies vomiting. No blood in stool.  HEME: Denies bleeding problems.  : Denies urgency. No painful urination. No blood in urine.  MS: Denies joint stiffness. Denies joint swelling.   SKIN: Denies rash.   NEURO: Denies seizures. No weakness.  PSYCH:  Denies difficulty sleeping. No anxiety. Denies depression. No suicidal thoughts.       VITALS:   Vitals:    07/24/25 1341   BP: (!) 137/91   Pulse: 100   Temp: 97.8 °F (36.6 °C)   TempSrc: Oral   SpO2: 95%   Weight: 106.9 kg (235 lb 10.8 oz)   Height: 5' 4" (1.626 m)   PainSc:   7   PainLoc: Back             PHYSICAL EXAM:   GENERAL: Well appearing, in no acute distress, alert and oriented x3.  PSYCH:  Mood and affect appropriate.  SKIN: Skin color, texture, turgor normal, no rashes or lesions.  HEENT:  Normocephalic, atraumatic. Cranial nerves grossly intact.  NECK: No pain to palpation over the cervical paraspinous muscles. No pain to palpation over facets. No pain with neck flexion, extension, or lateral flexion.   PULM: No evidence of respiratory difficulty, symmetric chest rise.  GI:  Non-distended  BACK:   Good ROM   Reproduced pain with extension.    No midline tenderness to palpation.    Positive right slump test (reproduced pain)    +Pain over right SIJ (does not reproduce pain)  +LIZA/FADIR for both pain into groin " and into the lateral/posterior hip  + Logroll  Tenderness to palpation over the right piriformis (reproduced pain).   + axial loadign lumbar spine  TTP along R GTB  + logroll for R hip pain   EXTREMITIES: No deformities, edema, or skin discoloration.   MUSCULOSKELETAL: No atrophy is noted.  NEURO: Sensation is equal and appropriate bilaterally. Bilateral upper and lower extremity strength is normal and symmetric. Negative babinski bilaterally   GAIT: normal.      LABS:  BUN/Cr: 13/0.9    IMAGING:    MRI Right hip 7/15/25  FINDINGS:  No fracture or marrow infiltrative process.     There is a tear of the left superior acetabular labrum.  There is adjacent chondral fissuring with mild subcortical cystic change.  No joint effusion.  Ligamentum teres is unremarkable.     Large field-of-view evaluation of the left hip demonstrates mild degenerative changes.  No joint effusion.     Pubic symphysis and sacroiliac joints are unremarkable.     Regional muscles and tendons are unremarkable.  No evidence for iliopsoas or trochanteric bursitis.  No evidence for ischiofemoral impingement.     No pelvic ascites or lymphadenopathy.     Impression:     Right hip superior acetabular labral tear with adjacent chondral fissuring and mild subcortical cystic change.      ASSESSMENT: 55 y.o. year old male with pain, consistent with:    Encounter Diagnoses   Name Primary?    Pain of right hip     Tear of right acetabular labrum, subsequent encounter Yes    Chronic bilateral low back pain without sciatica     Fall, sequela            DISCUSSION: Zeeshan Patel is a English/Malay-speaking man who comes to us after a fall. He was carrying a washing machine up some stairs and fell backwards with the machine landing on top of him.  He was experiencing ongoing radicular pain despite PT. Imaging shows L3/4 and 4/L5 bulge with mild to moderate bilateral neural foramina stenosis. Hip Xray is normal. Exam is significant for positive right  "straight leg raise and tenderness to palpation over the right piriformis and with right hip log roll. However, we have tried several injections with short-lived relief (ILESI, Right Hip, Right GTB, Right Piriformis, Right SIJ). He has had some improvement in back pain with healthy back. He continues to struggle with anterior hip pain. Hip MRI revealed an anterior labral tear. He reports being about 50% back to normal.     PLAN:   Reviewed MRI right hip results which showed a labral tear.   Provided a referral to orthopedics to address his labral tear found on MRI.  Reviewed [facility] notes.   Discussed healthy back referral today, he may benefit more from "Work Hardening"    See work restrictions below. Printed copy provided to the patient today.  He  can return to light duty as his work requires him to tolerate up to 50 lbs of lifting. Encouraged to speak with employer to find opportunity for him to return with light duty   Work release note can be found under "Communications"  Continue gabapentin BID and ibuprofen prn with therapy as needed   Follow up in 3 month to discuss further work release    The above plan and management options were discussed at length with patient. Patient is in agreement with the above and verbalized understanding.     Darek Edwards M.D.  PGY-3  [employer]     (To get to this printable note with [facility] header, go to the communications tab and make a new work note)    WORK NOTE:   July 24, 2025      Patient: Zeeshan Patel   YOB: 1970  Date of Visit: 07/24/2025    To Whom It May Concern:    Dedrick Patel  was at [facility] on 07/24/2025. The patient may return to work/school on 7/24/2025 with restrictions--restrictions include limiting lifting up to 50 lbs limited kneeling, turning/twisting. He cannot carry 50 lbs up stairs. If you have any questions or concerns, or if I can be of further assistance, please do not hesitate to contact " me.    Sincerely,    Amanda Lewis MD

## 2025-07-24 NOTE — LETTER
July 24, 2025      [facility]  [address]  [facility] [address]  Phone: [phone]  Fax: [phone]       Patient: Zeeshan Patel   YOB: 1970  Date of Visit: 07/24/2025    To Whom It May Concern:    Dedrick Patel  was at [facility] on 07/24/2025. The patient may return to work/school on 7/24/25 with restrictions, restrictions include limiting lifting up to 50 lbs limited kneeling, turning/twisting. He cannot carry 50 lbs up stairs. . If you have any questions or concerns, or if I can be of further assistance, please do not hesitate to contact me.    Sincerely,    Amanda Lewis MD

## 2025-07-24 NOTE — PROGRESS NOTES
WORK NOTE:   July 24, 2025      Patient: Zeeshan Patel   YOB: 1970  Date of Visit: 07/24/2025    To Whom It May Concern:    Dedrick Patel  was at [facility] on 07/24/2025. The patient may return to work/school on 7/24/2025 with restrictions--restrictions include limiting lifting up to 50 lbs limited kneeling, turning/twisting. He cannot carry 50 lbs up stairs. If you have any questions or concerns, or if I can be of further assistance, please do not hesitate to contact me.    Sincerely,    Amanda Lewis MD

## 2025-07-25 ENCOUNTER — TELEPHONE (OUTPATIENT)
Dept: PAIN MEDICINE | Facility: CLINIC | Age: 55
End: 2025-07-25
Payer: MEDICAID

## 2025-07-25 NOTE — TELEPHONE ENCOUNTER
Staff contacted pt to provide the medical records dept phone number to assist him with receiving records.   show

## 2025-07-25 NOTE — TELEPHONE ENCOUNTER
Copied from CRM #[id number]. Topic: General Inquiry - Patient Advice  >> Jul 25, 2025  1:06 PM Swati wrote:  Type: Patient call    Who called: Giovany from [facility]    Does the patient know what this is regarding? Requesting disability form ; needing records from June; please advise     Would the patient rather a call back or response via [facility]? Call    Best call back number: [phone]    Additional information:  Reference #: [id number]  Fax: [phone]

## 2025-08-01 ENCOUNTER — TELEPHONE (OUTPATIENT)
Dept: PAIN MEDICINE | Facility: CLINIC | Age: 55
End: 2025-08-01
Payer: MEDICAID

## 2025-08-01 NOTE — TELEPHONE ENCOUNTER
ASKED [employer] TO FAX DOCUMENTS TO [facility] @ [phone] TO BE REVIEWED. CALL ENDED.     Copied from CRM #[id number]. Topic: General Inquiry - Status Check  >> Aug 1, 2025  2:04 PM Lazara wrote:  Type : Patient Call    Who Called : Giovany (Cecil Minaya)       Does the patient know what this is regarding?: Requesting a call back regarding the disability form faxed over on 7/25/25;      Would the patient rather a call back or a response via [facility]? Call       Best Call Back Number: [phone] (Please mention reference number [id number]    Additional Information:  Fax- [phone]

## 2025-08-12 ENCOUNTER — TELEPHONE (OUTPATIENT)
Dept: PAIN MEDICINE | Facility: CLINIC | Age: 55
End: 2025-08-12
Payer: MEDICAID

## 2025-08-21 ENCOUNTER — TELEPHONE (OUTPATIENT)
Dept: URGENT CARE | Facility: CLINIC | Age: 55
End: 2025-08-21
Payer: MEDICAID

## 2025-08-21 DIAGNOSIS — M25.551 RIGHT HIP PAIN: ICD-10-CM

## 2025-08-21 DIAGNOSIS — S39.012A ACUTE MYOFASCIAL STRAIN OF LUMBAR REGION, INITIAL ENCOUNTER: ICD-10-CM

## 2025-08-21 DIAGNOSIS — Z02.6 ENCOUNTER RELATED TO WORKER'S COMPENSATION CLAIM: Primary | ICD-10-CM

## 2025-08-29 ENCOUNTER — TELEPHONE (OUTPATIENT)
Dept: PAIN MEDICINE | Facility: CLINIC | Age: 55
End: 2025-08-29
Payer: MEDICAID